# Patient Record
Sex: FEMALE | Race: WHITE | NOT HISPANIC OR LATINO | Employment: FULL TIME | URBAN - METROPOLITAN AREA
[De-identification: names, ages, dates, MRNs, and addresses within clinical notes are randomized per-mention and may not be internally consistent; named-entity substitution may affect disease eponyms.]

---

## 2017-01-12 ENCOUNTER — GENERIC CONVERSION - ENCOUNTER (OUTPATIENT)
Dept: OTHER | Facility: OTHER | Age: 47
End: 2017-01-12

## 2017-01-17 ENCOUNTER — ALLSCRIPTS OFFICE VISIT (OUTPATIENT)
Dept: OTHER | Facility: OTHER | Age: 47
End: 2017-01-17

## 2017-01-17 DIAGNOSIS — E03.9 HYPOTHYROIDISM: ICD-10-CM

## 2017-01-18 ENCOUNTER — LAB CONVERSION - ENCOUNTER (OUTPATIENT)
Dept: OTHER | Facility: OTHER | Age: 47
End: 2017-01-18

## 2017-01-18 ENCOUNTER — GENERIC CONVERSION - ENCOUNTER (OUTPATIENT)
Dept: OTHER | Facility: OTHER | Age: 47
End: 2017-01-18

## 2017-01-18 LAB — TSH SERPL DL<=0.05 MIU/L-ACNC: 3.94 MIU/L

## 2017-02-23 ENCOUNTER — ALLSCRIPTS OFFICE VISIT (OUTPATIENT)
Dept: OTHER | Facility: OTHER | Age: 47
End: 2017-02-23

## 2017-03-29 ENCOUNTER — ALLSCRIPTS OFFICE VISIT (OUTPATIENT)
Dept: OTHER | Facility: OTHER | Age: 47
End: 2017-03-29

## 2017-05-30 ENCOUNTER — ALLSCRIPTS OFFICE VISIT (OUTPATIENT)
Dept: OTHER | Facility: OTHER | Age: 47
End: 2017-05-30

## 2017-05-30 DIAGNOSIS — Z12.31 ENCOUNTER FOR SCREENING MAMMOGRAM FOR MALIGNANT NEOPLASM OF BREAST: ICD-10-CM

## 2017-06-19 ENCOUNTER — HOSPITAL ENCOUNTER (OUTPATIENT)
Dept: MAMMOGRAPHY | Facility: HOSPITAL | Age: 47
Discharge: HOME | End: 2017-06-19

## 2017-06-19 DIAGNOSIS — Z12.31 ENCOUNTER FOR SCREENING MAMMOGRAM FOR MALIGNANT NEOPLASM OF BREAST: ICD-10-CM

## 2017-06-19 PROCEDURE — G0202 SCR MAMMO BI INCL CAD: HCPCS

## 2017-10-31 DIAGNOSIS — I10 ESSENTIAL (PRIMARY) HYPERTENSION: ICD-10-CM

## 2017-10-31 DIAGNOSIS — E03.9 HYPOTHYROIDISM: ICD-10-CM

## 2017-11-13 ENCOUNTER — ALLSCRIPTS OFFICE VISIT (OUTPATIENT)
Dept: OTHER | Facility: OTHER | Age: 47
End: 2017-11-13

## 2017-11-14 NOTE — PROGRESS NOTES
Assessment    1  Essential hypertension (401 9) (I10)   2  Hypothyroidism (244 9) (E03 9)   3  Anxiety disorder (300 00) (F41 9)   4  Acute right-sided low back pain with right-sided sciatica (724 2,724 3) (M54 41)    Plan  Acute right-sided low back pain with right-sided sciatica    · PredniSONE 10 MG Oral Tablet; 5 for 2 days, 4 for 2 days, 3 for 2 days , 2for 2 days , 1 for 2 days  Anxiety disorder    · Citalopram Hydrobromide 20 MG Oral Tablet; TAKE 1 TABLET BY MOUTHEVERY DAY  Essential hypertension    · Metoprolol Tartrate 25 MG Oral Tablet; Take 1 tablet daily   · (1) CBC/PLT/DIFF; Status:Active; Requested for:13Nov2017;    · (1) COMPREHENSIVE METABOLIC PANEL; Status:Active; Requested for:13Nov2017;    · (1) LIPID PANEL, FASTING; Status:Active; Requested for:13Nov2017;    · (1) URINALYSIS (will reflex a microscopy if leukocytes, occult blood, protein or nitrites arenot within normal limits); Status:Active; Requested for:13Nov2017;   Hypothyroidism    · Levothyroxine Sodium 100 MCG Oral Tablet; TAKE 1 TABLET BY MOUTH ONCEDAILY   · (1) T4, FREE; Status:Active; Requested for:13Nov2017;    · (1) TSH; Status:Active; Requested for:13Nov2017;   Patient will call me next week and let me know how she is doing with her back  Continue other medications  Obtain the ordered lab work  Staff will call her only get the results  Discussion/Summary  Possible side effects of new medications were reviewed with the patient/guardian today  The treatment plan was reviewed with the patient/guardian  The patient/guardian understands and agrees with the treatment plan      Chief Complaint  follow up thyroid,anxiety,htn  refill meds 90 days back pain      History of Present Illness  Patient is a 81-year-old female presenting to the office for follow-up on hypertension, hypothyroidism, and anxiety  Overall she feels well from that standpoint  She is complaining of some right-sided low back pain  Does radiate down her right leg  Think she may have had a similar problem years ago  Review of Systems   Constitutional: No fever, no chills, feels well, no tiredness, no recent weight gain or weight loss  Eyes: No complaints of eye pain, no red eyes, no eyesight problems, no discharge, no dry eyes, no itching of eyes  ENT: no complaints of earache, no loss of hearing, no nose bleeds, no nasal discharge, no sore throat, no hoarseness  Cardiovascular: No complaints of slow heart rate, no fast heart rate, no chest pain, no palpitations, no leg claudication, no lower extremity edema  Respiratory: No complaints of shortness of breath, no wheezing, no cough, no SOB on exertion, no orthopnea, no PND  Gastrointestinal: No complaints of abdominal pain, no constipation, no nausea or vomiting, no diarrhea, no bloody stools  Genitourinary: no dysuria-- and-- no incontinence  Musculoskeletal: No complaints of arthralgias, no myalgias, no joint swelling or stiffness, no limb pain or swelling  Integumentary: No complaints of skin rash or lesions, no itching, no skin wounds, no breast pain or lump  Neurological: No complaints of headache, no confusion, no convulsions, no numbness, no dizziness or fainting, no tingling, no limb weakness, no difficulty walking  Psychiatric: anxiety-- and-- Stable on current medications  Endocrine: No complaints of proptosis, no hot flashes, no muscle weakness, no deepening of the voice, no feelings of weakness  Hematologic/Lymphatic: No complaints of swollen glands, no swollen glands in the neck, does not bleed easily, does not bruise easily  Active Problems    1  Anxiety disorder (300 00) (F41 9)   2  Encounter for screening mammogram for malignant neoplasm of breast (V76 12) (Z12 31)   3  Essential hypertension (401 9) (I10)   4  Generalized osteoarthritis of multiple sites (715 09) (M15 9)   5  Hypothyroidism (244 9) (E03 9)   6  Stress incontinence, female (625 6) (N39 3)   7   Surgical menopause (627 4) (E89 40)    Past Medical History    1  History of Conjunctivitis of left eye (372 30) (H10 9)   2  History of Encounter for routine gynecological examination with Papanicolaou smear of cervix (V72 31,V76 2) (Z01 419)   3  History of cervical dysplasia (V13 22) (Z87 410)   4  History of dizziness (V13 89) (Z87 898)   5  History of viral gastroenteritis (V12 09) (Z86 19)   6  History of Menopause (627 2) (Z78 0)    Surgical History  1  History of Salpingo-oophorectomy Bilateral   2  History of Total Abdominal Hysterectomy   3  History of Tubal Ligation    The surgical history was reviewed and updated today  Family History  Father    1  Family history of diabetes mellitus (V18 0) (Z83 3)   2  Family history of hypertension (V17 49) (Z82 49)  Family History    3  Family history of Breast Cancer (V16 3)   4  Family history of Diabetes Mellitus (V18 0)   5  Family history of Hypertension (V17 49)   6  Family history of Skin Cancer (V16 8)    The family history was reviewed and updated today  Social History     · Being A Social Drinker   · Denied: History of Drug Use   · Former smoker (V15 82) (O01 615)   · Uses Safety Equipment - Seatbelts  The social history was reviewed and updated today  The social history was reviewed and is unchanged  Current Meds   1  Citalopram Hydrobromide 20 MG Oral Tablet; TAKE ONE TABLET BY MOUTH ONCE DAILY; Therapy: 90GTN1357 to (Evaluate:39Hun1273)  Requested for: 16Oci6313; Last Rx:74Iuc6227 Ordered   2  Estradiol 2 MG Oral Tablet; TAKE 1 TABLET DAILY; Therapy: 77NXW7804 to (Daryn Easton)  Requested for: 13XWJ5764; Last Rx:57Yti4851 Ordered   3  Levothyroxine Sodium 100 MCG Oral Tablet; TAKE ONE TABLET BY MOUTH ONCE DAILY; Therapy: 28ZFP6398 to (Evaluate:57Hqy4264)  Requested for: 65IJY9718; Last Rx:01Pcd4207 Ordered   4  Metoprolol Tartrate 25 MG Oral Tablet; TAKE ONE TABLET BY MOUTH ONCE DAILY;  Therapy: 41PFW1170 to [phone])  Requested for: [phone]; Last Rx:21Gzi8901 Ordered   5  Nabumetone 500 MG Oral Tablet; Take 1 tablet twice daily; Therapy: 04Mnf5853 to (Parisa Cuong)  Requested for: 56Swn2118; Last Rx:72Crr9704 Ordered    The medication list was reviewed and updated today  Allergies  1  ACE Inhibitors   2  Phenothiazines   3  Bactrim DS TABS   4  Compazine CPCR   5  Quinolones    Vitals  Vital Signs    Recorded: 43DOC4626 76:57JK   Systolic 757   Diastolic 78   Height 5 ft 5 5 in   Weight 280 lb    BMI Calculated 45 89   BSA Calculated 2 29       Physical Exam   Constitutional  General appearance: Abnormal   acutely ill,-- obese-- and-- appears tired  Ears, Nose, Mouth, and Throat  External inspection of ears and nose: Normal    Otoscopic examination: Tympanic membranes translucent with normal light reflex  Canals patent without erythema  Nasal mucosa, septum, and turbinates: Normal without edema or erythema  Oropharynx: Normal with no erythema, edema, exudate or lesions  Pulmonary  Auscultation of lungs: Clear to auscultation  Cardiovascular  Auscultation of heart: Normal rate and rhythm, normal S1 and S2, without murmurs  Examination of extremities for edema and/or varicosities: Normal    Abdomen  Abdomen: Non-tender, no masses  Lymphatic  Palpation of lymph nodes in neck: No lymphadenopathy  Musculoskeletal  Gait and station: Normal  -- Right lumbar tenderness with decreased range of motion  Negative straight leg raising  Motor and sensorium are grossly intact  Skin  Skin and subcutaneous tissue: Normal without rashes or lesions  Neurologic  Cranial nerves: Cranial nerves 2-12 intact  Reflexes: 2+ and symmetric  Sensation: No sensory loss     Psychiatric  Orientation to person, place, and time: Normal    Mood and affect: Normal          Results/Data  * MAMMO SCREENING BILATERAL W CAD 56KHA1488 01:51PM Reynaldo Damon      Order Number: [id number]  Performing Comments: breast ca in maternal grandmother age 61   - Patient Instructions: To schedule this appointment, please contact Central Scheduling at [phone]  Do not wear any perfume, powder, lotion or deodorant on breast or underarm area  Please bring your doctors order, referral (if needed) and insurance information with you on the day of the test  Failure to bring this information may result in this test being rescheduled  Arrive 15 minutes prior to your appointment time to register  On the day of your test, please bring any prior mammogram or breast studies with you that were not performed at a [facility]  Failure to bring prior exams may result in your test needing to be rescheduled  Test Name Result Flag Reference   MAMMO SCREENING BILATERAL W CAD (Report)       Patient History:  Patient is postmenopausal   Family history of breast cancer at age 61 in maternal   grandmother  Taking estrogen beginning at age 30  Patient is a former smoker  Patient's BMI is 44 4  Reason for exam: screening, asymptomatic  Mammo Screening Bilateral W CAD: June 19, 2017 - Check In #:   [de-identified]  Bilateral MLO and CC view(s) were taken  Technologist: ANGELINA Castro (ANGELINA)(M)  Prior study comparison: May 23, 2016, mammo screening bilateral W  CAD performed at [facility]  October 22, 2012, bilateral digital screening mammogram,   performed at [place] [address]  September 19, 2011, bilateral digital screening mammogram, performed at [place] [address]  October 15, 2007,   bilateral digital bilat mammo cad, performed at [place] [address]  There are scattered fibroglandular densities  No dominant soft tissue mass, architectural distortion or   suspicious calcifications are noted in either breast  The skin   and nipple contours are within normal limits  No evidence of malignancy  No significant changes when compared with prior studies     ACR BI-RADSï¾® Assessments: BiRad:1 - Negative   Recommendation:  Routine screening mammogram of both breasts in 1 year  A   reminder letter will be scheduled  Analyzed by CAD   8-10% of cancers will be missed on mammography  Management of a   palpable abnormality must be based on clinical grounds  Patients  will be notified of their results via letter from our facility  Accredited by [employer] of [employer] and FDA     Transcription Location: [facility] 143: [id number]   Risk Value(s):  Tyrer-Cuzick 10 Year: 1 500%, Tyrer-Cuzick Lifetime: 7 800%,   Myriad Table: 1 5%, FRANCO 5 Year: 0 6%, NCI Lifetime: 6 3%  Signed by:  Allan Daly MD  6/19/17     (Q) TSH, 3RD GENERATION [id number] 10:58AM [facility]     Test Name Result Flag Reference   TSH 3 94 mIU/L       Reference Range                       > or = 20 Years  0 40-4 50                            Pregnancy Ranges           First trimester    0 26-2 66           Second trimester   0 55-2 73           Third trimester    0 43-2 91       Signatures   Electronically signed by : Yeimi Stevenson DO; Nov 13 2017  3:39PM EST                       (Author)

## 2018-01-03 ENCOUNTER — GENERIC CONVERSION - ENCOUNTER (OUTPATIENT)
Dept: OTHER | Facility: OTHER | Age: 48
End: 2018-01-03

## 2018-01-03 ENCOUNTER — LAB CONVERSION - ENCOUNTER (OUTPATIENT)
Dept: OTHER | Facility: OTHER | Age: 48
End: 2018-01-03

## 2018-01-03 LAB
T4 FREE SERPL-MCNC: 1 NG/DL (ref 0.8–1.8)
TSH SERPL DL<=0.05 MIU/L-ACNC: 3.54 MIU/L

## 2018-01-09 NOTE — MISCELLANEOUS
Message  Patient called to set up appointment for annual exam  Patient does not have insurance therefore did not want to have annual exam because she is still making payments from last exam  I gave patient information for the [employer] so she can set up a free exam with them  Active Problems    1  Anxiety disorder (300 00) (F41 9)   2  Essential hypertension (401 9) (I10)   3  Generalized osteoarthritis of multiple sites (715 09) (M15 9)   4  Hypothyroidism (244 9) (E03 9)    Current Meds   1  Citalopram Hydrobromide 20 MG Oral Tablet; TAKE 1 TABLET BY MOUTH EVERY DAY; Therapy: 28OFD8405 to (Evaluate:03Jul2016)  Requested for: 94WUN3534; Last   Rx:05Jan2016 Ordered   2  Estradiol 2 MG Oral Tablet; TAKE 1 TABLET DAILY; Therapy: 87CSZ8240 to (Evaluate:05Jun2016)  Requested for: 49UBU8549; Last   Rx:06Apr2016 Ordered   3  Levothyroxine Sodium 100 MCG Oral Tablet; TAKE 1 TABLET DAILY; Therapy: 45CPP6592 to (Evaluate:04Jul2016)  Requested for: 38CYR0682; Last   Rx:06Jan2016 Ordered   4  LORazepam 0 5 MG Oral Tablet; TAKE ONE-HALF TO ONE TABLET BY MOUTH THREE   TIMES DAILY AS NEEDED; Therapy: 28Wkk5841 to (Filiberto Skinner)  Requested for: 27DON6740; Last   Rx:50Zdc1444; Status: ACTIVE - Renewal Denied Ordered   5  Meloxicam 15 MG Oral Tablet; take 1 tablet every day; Therapy: 14GWW2535 to (Evaluate:03Jul2016)  Requested for: 85KAS8433; Last   Rx:05Jan2016 Ordered   6  Metoprolol Tartrate 25 MG Oral Tablet; Take 1 tablet daily; Therapy: 40KJL1669 to (Evaluate:03Jul2016)  Requested for: 51NFJ8580; Last   Rx:05Jan2016 Ordered    Allergies    1  ACE Inhibitors   2  Phenothiazines   3  Bactrim DS TABS   4  Compazine CPCR   5   Quinolones    Signatures   Electronically signed by : Naomi Hercules, ; Apr 7 2016  2:05PM EST                       (Author)

## 2018-01-11 NOTE — RESULT NOTES
Verified Results  (Q) TSH, 3RD GENERATION [id number] 10:13AM Susanne Fox   REPORT COMMENT:  FASTING:NO     Test Name Result Flag Reference   TSH 3 97 mIU/L     Reference Range                         > or = 20 Years  0 40-4 50                              Pregnancy Ranges            First trimester    0 26-2 66            Second trimester   0 55-2 73            Third trimester    0 43-2 91

## 2018-01-12 VITALS — DIASTOLIC BLOOD PRESSURE: 78 MMHG | WEIGHT: 275.5 LBS | SYSTOLIC BLOOD PRESSURE: 120 MMHG | BODY MASS INDEX: 45.85 KG/M2

## 2018-01-12 VITALS
SYSTOLIC BLOOD PRESSURE: 122 MMHG | WEIGHT: 275.38 LBS | BODY MASS INDEX: 45.83 KG/M2 | TEMPERATURE: 99.7 F | DIASTOLIC BLOOD PRESSURE: 82 MMHG

## 2018-01-12 NOTE — MISCELLANEOUS
Message  Having discussed my conversation with the pt; the Estrace costs $300  She brought a form to the office 2 wks after her Sept 2016 felicia't to have filled in by Dr Drake Dela Cruz  This form can be submitted to the company for pt to receive a price reduction in the Estrace (she is self pay)  The staff in [facility] will look for the form  I asked pt if she could download another form + the HIPAA authorization to release information  She will look for the website she had used before but can't locate the site yet  Dr Drkae Dela Cruz stated: Pt was going to try weight loss and Kegel exercises with the Estrace Cream  If pt has lost some weight and is using the [address] and does not have improvement in her symptoms, she does not have to use the Estrace and she should come for a follow up felicia't to discuss possible surgery  Pt stated she has just begun weight loss at home (dietary), is doing her Kegel exercises and reports she has some improvement in symptoms  Pt stated she is not interested in surgery  She will get the Estrace Cream and f/u in 3 mos  Advised to return to clinic prn  Active Problems    1  Anxiety disorder (300 00) (F41 9)   2  Essential hypertension (401 9) (I10)   3  Generalized osteoarthritis of multiple sites (715 09) (M15 9)   4  Hypothyroidism (244 9) (E03 9)   5  Stress incontinence, female (625 6) (N39 3)   6  Surgical menopause (627 4) (E89 40)    Current Meds   1  Citalopram Hydrobromide 20 MG Oral Tablet; TAKE ONE TABLET BY MOUTH ONCE   DAILY; Therapy: 62PFR6517 to (Evaluate:02Eal8664)  Requested for: 90LAN1981; Last   Rx:12Jan2017 Ordered   2  Estrace 0 1 MG/GM Vaginal Cream; APPLY A PEA-SIZED AMOUNT TO VAGINAL   OPENING EVERY MONDAY  AND FRIDAY EVENING; Therapy: 13BHL7316 to (Last Rx:08Sep2016)  Requested for: 08Sep2016 Ordered   3  Estradiol 2 MG Oral Tablet; TAKE 1 TABLET DAILY; Therapy: 40VAZ8410 to (Evaluate:12May2017)  Requested for: 21RBA1682; Last   Rx:17May2016 Ordered   4   Levothyroxine Sodium 100 MCG Oral Tablet; TAKE ONE TABLET BY MOUTH ONCE   DAILY; Therapy: 42XEB7642 to (Evaluate:06Kte1326)  Requested for: 85Msv7815; Last   Rx:42Tjg5204 Ordered   5  Meloxicam 15 MG Oral Tablet; TAKE ONE TABLET BY MOUTH ONCE DAILY; Therapy: 14ZMG5997 to (Evaluate:58Mwi5697)  Requested for: 39VFZ7670; Last   Rx:12Jan2017 Ordered   6  Metoprolol Tartrate 25 MG Oral Tablet; TAKE ONE TABLET BY MOUTH ONCE DAILY; Therapy: 62VSO9890 to (Evaluate:12Apr2017)  Requested for: 90QLV7876; Last   Rx:12Jan2017 Ordered   7  Metoprolol Tartrate 50 MG Oral Tablet; TAKE 1 TABLET DAILY; Therapy: 51OBM4443 to (Florence Black)  Requested for: 93WGQ5812; Last   Rx:01Jun2016 Ordered    Allergies    1  ACE Inhibitors   2  Phenothiazines   3  Bactrim DS TABS   4  Compazine CPCR   5   Quinolones    Signatures   Electronically signed by : Akosua Rodrigez RN; Jan 16 2017  5:16PM EST                       (Author)

## 2018-01-13 VITALS
BODY MASS INDEX: 45 KG/M2 | WEIGHT: 280 LBS | DIASTOLIC BLOOD PRESSURE: 78 MMHG | HEIGHT: 66 IN | SYSTOLIC BLOOD PRESSURE: 120 MMHG

## 2018-01-13 VITALS
DIASTOLIC BLOOD PRESSURE: 81 MMHG | HEIGHT: 66 IN | WEIGHT: 274 LBS | BODY MASS INDEX: 44.03 KG/M2 | SYSTOLIC BLOOD PRESSURE: 122 MMHG

## 2018-01-15 VITALS
TEMPERATURE: 98 F | DIASTOLIC BLOOD PRESSURE: 78 MMHG | WEIGHT: 277 LBS | BODY MASS INDEX: 45.6 KG/M2 | SYSTOLIC BLOOD PRESSURE: 132 MMHG

## 2018-01-15 NOTE — MISCELLANEOUS
Message  Pt had urogynecology felicia't  9/8/16  Estrace vag cream was presribed  Pt is self pay  She said the cost was greater than $300  She had a form to be completed which is from the  so she can obtain the Estrace at a lower price    She will bring in a new form and new authorization to release information to be completed  She had cancelled her f/u appt because she had not been using the cream       Active Problems    1  Anxiety disorder (300 00) (F41 9)   2  Essential hypertension (401 9) (I10)   3  Generalized osteoarthritis of multiple sites (715 09) (M15 9)   4  Hypothyroidism (244 9) (E03 9)   5  Stress incontinence, female (625 6) (N39 3)   6  Surgical menopause (627 4) (E89 40)    Current Meds   1  Citalopram Hydrobromide 20 MG Oral Tablet; TAKE ONE TABLET BY MOUTH ONCE   DAILY; Therapy: 54FDX5248 to (Evaluate:25Cmi3286)  Requested for: 84GXM5114; Last   Rx:12Jan2017 Ordered   2  Estrace 0 1 MG/GM Vaginal Cream; APPLY A PEA-SIZED AMOUNT TO VAGINAL   OPENING EVERY MONDAY  AND FRIDAY EVENING; Therapy: 32DST4448 to (Last Rx:42Fty8489)  Requested for: 22Dwi0634 Ordered   3  Estradiol 2 MG Oral Tablet; TAKE 1 TABLET DAILY; Therapy: 79EGB4283 to (Evaluate:91Rpg3261)  Requested for: 09BWT4597; Last   Rx:32Bzi8893 Ordered   4  Levothyroxine Sodium 100 MCG Oral Tablet; TAKE ONE TABLET BY MOUTH ONCE   DAILY; Therapy: 65NTI7670 to (Evaluate:23Soo1089)  Requested for: 88Dfc5011; Last   Rx:41Xjy6012 Ordered   5  Meloxicam 15 MG Oral Tablet; TAKE ONE TABLET BY MOUTH ONCE DAILY; Therapy: 02EWY7046 to (Evaluate:39Ywu8243)  Requested for: 33XGA6754; Last   Rx:12Jan2017 Ordered   6  Metoprolol Tartrate 25 MG Oral Tablet; TAKE ONE TABLET BY MOUTH ONCE DAILY; Therapy: 60GGN9710 to (Evaluate:12Apr2017)  Requested for: 26QME8873; Last   Rx:12Jan2017 Ordered   7  Metoprolol Tartrate 50 MG Oral Tablet; TAKE 1 TABLET DAILY;    Therapy: 45UIC6208 to (Real [place])  Requested for: 69IQZ2047; Last Rx: 52XEI8865 Ordered    Allergies    1  ACE Inhibitors   2  Phenothiazines   3  Bactrim DS TABS   4  Compazine CPCR   5   Quinolones    Signatures   Electronically signed by : Brien Butt RN; Jan 16 2017  5:14PM EST                       (Author)

## 2018-01-15 NOTE — RESULT NOTES
Verified Results  (Q) TSH, 3RD GENERATION 82DRJ5814 10:58AM Blanche Lanes     Test Name Result Flag Reference   TSH 3 94 mIU/L     Reference Range                         > or = 20 Years  0 40-4 50                              Pregnancy Ranges            First trimester    0 26-2 66            Second trimester   0 55-2 73            Third trimester    0 43-2 91       Discussion/Summary   Thyroid lab work was normal  Continue current dose  Recheck in 6 months

## 2018-01-23 NOTE — RESULT NOTES
Discussion/Summary   Thyroid is normal   Continue current medications       Verified Results  (1) T4, FREE 93YKM9253 11:48AM Merlinda Hodgkins     Test Name Result Flag Reference   T4, FREE 1 0 ng/dL  0 8-1 8     (Q) TSH, 3RD GENERATION [id number] 11:48AM Merlinda Hodgkins   REPORT COMMENT:  FASTING:YES     Test Name Result Flag Reference   TSH 3 54 mIU/L     Reference Range                         > or = 20 Years  0 40-4 50                              Pregnancy Ranges            First trimester    0 26-2 66            Second trimester   0 55-2 73            Third trimester    0 43-2 91

## 2018-03-06 ENCOUNTER — TELEPHONE (OUTPATIENT)
Dept: FAMILY MEDICINE CLINIC | Facility: CLINIC | Age: 48
End: 2018-03-06

## 2018-03-07 NOTE — TELEPHONE ENCOUNTER
I am not qualified to read MRIs, nor is any chiropractor that I know of what diagnosis to the chiropractor give her    If she lets me know I might be able send her to an appropriate orthopedic doctor or pain management specialist

## 2018-03-09 NOTE — PROGRESS NOTES
MRI does show herniated disc disease  If it is chest pain going down the leg, she should see pain management  If she has any neurologic deficits such as weakness in the leg, loss of control of bowel or bladder, she needs to see a surgeon sooner than later  If she has sudden onset of profound weakness, she needs to go to the emergency room  She should not get manipulation of her lumbar spine, it could put her in a wheelchair

## 2018-04-15 RX ORDER — NABUMETONE 500 MG/1
1 TABLET, FILM COATED ORAL 2 TIMES DAILY
COMMUNITY
Start: 2017-08-06 | End: 2018-12-17 | Stop reason: ALTCHOICE

## 2018-04-15 RX ORDER — CITALOPRAM 20 MG/1
1 TABLET ORAL DAILY
COMMUNITY
Start: 2012-12-02 | End: 2018-09-07 | Stop reason: SDUPTHER

## 2018-04-15 RX ORDER — LEVOTHYROXINE SODIUM 0.1 MG/1
1 TABLET ORAL DAILY
COMMUNITY
Start: 2016-01-06 | End: 2019-01-29 | Stop reason: SDUPTHER

## 2018-04-16 ENCOUNTER — OFFICE VISIT (OUTPATIENT)
Dept: FAMILY MEDICINE CLINIC | Facility: CLINIC | Age: 48
End: 2018-04-16

## 2018-04-16 VITALS
BODY MASS INDEX: 45 KG/M2 | WEIGHT: 280 LBS | DIASTOLIC BLOOD PRESSURE: 68 MMHG | HEIGHT: 66 IN | SYSTOLIC BLOOD PRESSURE: 110 MMHG

## 2018-04-16 DIAGNOSIS — R42 VERTIGO: Primary | ICD-10-CM

## 2018-04-16 PROBLEM — M51.9 LUMBAR DISC DISEASE: Status: ACTIVE | Noted: 2018-04-16

## 2018-04-16 PROCEDURE — 99212 OFFICE O/P EST SF 10 MIN: CPT | Performed by: FAMILY MEDICINE

## 2018-04-16 RX ORDER — ESTRADIOL 2 MG/1
2 TABLET ORAL DAILY
COMMUNITY
End: 2018-06-04 | Stop reason: SDUPTHER

## 2018-04-16 NOTE — PATIENT INSTRUCTIONS
I discussed with the patient her MRI report and current therapy  I cautioned her about manipulative therapy with her known disc disease, but she states that it is helping her and she will continue at this time  I recommended that once she get some insurance coverage she should follow up with pain management and possibly consider injection therapy

## 2018-04-16 NOTE — PROGRESS NOTES
Assessment/Plan:         Diagnoses and all orders for this visit:    Vertigo  Comments:  Most likely BPPV, pt has no insurance , will hold off on PT for now  Staff  will call with results  Orthostatic precations discussed  Orders:  -     Comprehensive metabolic panel; Future  -     CBC and differential; Future    Other orders  -     estradiol (ESTRACE) 2 MG tablet; Take 2 mg by mouth daily          Subjective:   Chief Complaint   Patient presents with    Dizziness      x 1 month         Patient ID: Tano Fernandez is a 53 y o  female  Patient is a 50-year-old female presenting to the office complaining of dizziness when changing position  Happens mostly when she rolls over in bed, but also happens when she gets up from lying down or gets up from being seated quickly  Episodes last less than 30 sec  Definitely positional   Denies any fever, chills, head trauma, new medications  Has been seeing a chiropractor for her low back pain and has questions about that as well  The following portions of the patient's history were reviewed and updated as appropriate: allergies, current medications, past family history, past medical history, past social history, past surgical history and problem list     Review of Systems   Constitutional: Negative  HENT: Negative  Eyes: Negative  Respiratory: Negative  Gastrointestinal: Negative  Endocrine: Positive for polydipsia and polyuria  Negative for polyphagia  Genitourinary: Positive for frequency  Negative for difficulty urinating, dysuria and urgency  Musculoskeletal: Positive for arthralgias and back pain  Negative for joint swelling  Neurological: Positive for dizziness  Negative for tremors, seizures, syncope, facial asymmetry, speech difficulty, weakness, light-headedness, numbness and headaches  Hematological: Negative for adenopathy  Does not bruise/bleed easily           Objective:      /68   Ht 5' 6" (1 676 m)   Wt 127 kg (280 lb) BMI 45 19 kg/m²          Physical Exam   Constitutional: She is oriented to person, place, and time  She appears well-developed and well-nourished  No distress  Morbidly obese  HENT:   Head: Normocephalic and atraumatic  Right Ear: External ear normal    Left Ear: External ear normal    Nose: Nose normal    Mouth/Throat: Oropharynx is clear and moist    Eyes: Conjunctivae and EOM are normal  Pupils are equal, round, and reactive to light  Neck: Normal range of motion  Neck supple  No JVD present  No tracheal deviation present  No thyromegaly present  Cardiovascular: Normal rate, regular rhythm and normal heart sounds  No murmur heard  Pulmonary/Chest: Effort normal and breath sounds normal  She has no wheezes  She has no rales  Abdominal: Soft  Bowel sounds are normal  She exhibits no mass  There is no tenderness  There is no rebound and no guarding  Musculoskeletal: Normal range of motion  Mild right lumbar tenderness  Limited range of motion with side bending rotation  Lymphadenopathy:     She has no cervical adenopathy  Neurological: She is alert and oriented to person, place, and time  She has normal reflexes  No cranial nerve deficit  Skin: Skin is warm and dry  No lesion and no rash noted  Psychiatric: She has a normal mood and affect  Judgment normal    Nursing note and vitals reviewed

## 2018-04-17 LAB
ALBUMIN SERPL-MCNC: 4.2 G/DL (ref 3.6–5.1)
ALBUMIN/GLOB SERPL: 1.6 (CALC) (ref 1–2.5)
ALP SERPL-CCNC: 67 U/L (ref 33–115)
ALT SERPL-CCNC: 19 U/L (ref 6–29)
AST SERPL-CCNC: 21 U/L (ref 10–35)
BASOPHILS # BLD AUTO: 29 CELLS/UL (ref 0–200)
BASOPHILS NFR BLD AUTO: 0.6 %
BILIRUB SERPL-MCNC: 0.4 MG/DL (ref 0.2–1.2)
BUN SERPL-MCNC: 16 MG/DL (ref 7–25)
BUN/CREAT SERPL: NORMAL (CALC) (ref 6–22)
CALCIUM SERPL-MCNC: 8.8 MG/DL (ref 8.6–10.2)
CHLORIDE SERPL-SCNC: 103 MMOL/L (ref 98–110)
CO2 SERPL-SCNC: 29 MMOL/L (ref 20–31)
CREAT SERPL-MCNC: 0.87 MG/DL (ref 0.5–1.1)
EOSINOPHIL # BLD AUTO: 163 CELLS/UL (ref 15–500)
EOSINOPHIL NFR BLD AUTO: 3.4 %
ERYTHROCYTE [DISTWIDTH] IN BLOOD BY AUTOMATED COUNT: 12.7 % (ref 11–15)
GLOBULIN SER CALC-MCNC: 2.6 G/DL (CALC) (ref 1.9–3.7)
GLUCOSE SERPL-MCNC: 92 MG/DL (ref 65–99)
HCT VFR BLD AUTO: 40.4 % (ref 35–45)
HGB BLD-MCNC: 13.5 G/DL (ref 11.7–15.5)
LYMPHOCYTES # BLD AUTO: 1877 CELLS/UL (ref 850–3900)
LYMPHOCYTES NFR BLD AUTO: 39.1 %
MCH RBC QN AUTO: 27.7 PG (ref 27–33)
MCHC RBC AUTO-ENTMCNC: 33.4 G/DL (ref 32–36)
MCV RBC AUTO: 82.8 FL (ref 80–100)
MONOCYTES # BLD AUTO: 403 CELLS/UL (ref 200–950)
MONOCYTES NFR BLD AUTO: 8.4 %
NEUTROPHILS # BLD AUTO: 2328 CELLS/UL (ref 1500–7800)
NEUTROPHILS NFR BLD AUTO: 48.5 %
PLATELET # BLD AUTO: 214 THOUSAND/UL (ref 140–400)
PMV BLD REES-ECKER: 10.1 FL (ref 7.5–12.5)
POTASSIUM SERPL-SCNC: 4.4 MMOL/L (ref 3.5–5.3)
PROT SERPL-MCNC: 6.8 G/DL (ref 6.1–8.1)
RBC # BLD AUTO: 4.88 MILLION/UL (ref 3.8–5.1)
SL AMB EGFR AFRICAN AMERICAN: 92 ML/MIN/1.73M2
SL AMB EGFR NON AFRICAN AMERICAN: 79 ML/MIN/1.73M2
SODIUM SERPL-SCNC: 139 MMOL/L (ref 135–146)
WBC # BLD AUTO: 4.8 THOUSAND/UL (ref 3.8–10.8)

## 2018-06-04 DIAGNOSIS — Z12.39 SCREENING FOR BREAST CANCER: ICD-10-CM

## 2018-06-04 DIAGNOSIS — E89.40 SURGICAL MENOPAUSE: Primary | ICD-10-CM

## 2018-06-07 RX ORDER — ESTRADIOL 2 MG/1
TABLET ORAL
Qty: 30 TABLET | Refills: 11 | Status: SHIPPED | OUTPATIENT
Start: 2018-06-07 | End: 2018-12-17

## 2018-06-10 DIAGNOSIS — E89.40 SURGICAL MENOPAUSE: ICD-10-CM

## 2018-06-13 RX ORDER — ESTRADIOL 2 MG/1
TABLET ORAL
Qty: 30 TABLET | Refills: 11 | OUTPATIENT
Start: 2018-06-13

## 2018-06-19 ENCOUNTER — OFFICE VISIT (OUTPATIENT)
Dept: OBGYN CLINIC | Facility: CLINIC | Age: 48
End: 2018-06-19
Payer: COMMERCIAL

## 2018-06-19 VITALS
DIASTOLIC BLOOD PRESSURE: 77 MMHG | HEIGHT: 66 IN | HEART RATE: 69 BPM | BODY MASS INDEX: 44.2 KG/M2 | SYSTOLIC BLOOD PRESSURE: 125 MMHG | WEIGHT: 275 LBS

## 2018-06-19 DIAGNOSIS — Z01.419 ENCOUNTER FOR ANNUAL ROUTINE GYNECOLOGICAL EXAMINATION: Primary | ICD-10-CM

## 2018-06-19 DIAGNOSIS — N95.1 HOT FLASHES DUE TO MENOPAUSE: ICD-10-CM

## 2018-06-19 PROCEDURE — 99386 PREV VISIT NEW AGE 40-64: CPT | Performed by: NURSE PRACTITIONER

## 2018-06-19 RX ORDER — ESTRADIOL 1 MG/1
1 TABLET ORAL DAILY
Qty: 90 TABLET | Refills: 4 | Status: SHIPPED | OUTPATIENT
Start: 2018-06-19 | End: 2019-12-03

## 2018-06-19 NOTE — PROGRESS NOTES
Assessment         1  Encounter for annual routine gynecological examination     2  Hot flashes due to menopause  estradiol (ESTRACE) 1 mg tablet            Plan      All questions answered  Breast self exam technique reviewed and patient encouraged to perform self-exam monthly  Discussed healthy lifestyle modifications  Follow up in 1 year  Mammogram    Continue estrace as directed  AISLINN HALE  [facility] mammogram appointment  Call with needs or concerns  Return in 1 year  Pt verbalized understanding of all discussed  Subjective      Rachel Colon is a 53 y o  female who presents for annual exam  Periods are not occurring Pt had MIGUEL BSO, lasting 0 days  Dysmenorrhea:none  Cyclic symptoms include none  No intermenstrual bleeding, spotting, or discharge  The patient reports that there is not domestic violence in her life  No currernt partner  Pt is taking Estrace for Hot Flashes and in ability to sleep  Pt states Family doctor would like her to stop Estrace she refused Due to hot flashes and inability to sleep, Discussed risk, pt states she needs the medication  Pt states she has been off estrace since 6/1/18 and has not slept and has hot flashes all day  Agreed to decrease Estrace to 1 mg and see how she feels  Pt had a MIGUEL BSO in 2014 for HPV? And endometriosis  Current contraception: none  History of abnormal Pap smear: no  Family history of uterine or ovarian cancer: no  Regular self breast exam: yes  History of abnormal mammogram: no  Family history of breast cancer: yes - Grandmother, provided genetic information  History of abnormal lipids: unsure  Menstrual History:  OB History     Obstetric Comments    MENOPAUSE         Menarche age: 13  No LMP recorded  Patient has had a hysterectomy   done in 2004       The following portions of the patient's history were reviewed and updated as appropriate: allergies, current medications, past family history, past medical history, past social history, past surgical history and problem list     Review of Systems  Pertinent items are noted in HPI        Objective      /77   Pulse 69   Ht 5' 5 6" (1 666 m)   Wt 125 kg (275 lb)   BMI 44 93 kg/m²     General:   alert and oriented, in no acute distress, alert, appears stated age and cooperative   Heart: regular rate and rhythm, S1, S2 normal, no murmur, click, rub or gallop   Lungs: clear to auscultation bilaterally   Thyroid negative masses   Abdomen: soft, non-tender, without masses or organomegaly   Vulva: normal   Vagina: normal mucosa   Cervix: surgically absent   Uterus: surgically absent   Adnexa: surgically absent   Breasts NT, negative masses, discharge or dimpling

## 2018-06-19 NOTE — PATIENT INSTRUCTIONS
Continue estrace as directed  AISLINN HALE  [facility] mammogram appointment  Call with needs or concerns  Return in 1 year

## 2018-06-20 ENCOUNTER — TELEPHONE (OUTPATIENT)
Dept: OBGYN CLINIC | Facility: CLINIC | Age: 48
End: 2018-06-20

## 2018-06-20 NOTE — TELEPHONE ENCOUNTER
Patient dumped 90 pills of her estrace medication into her cereal by accident   She is to call the pharmacy and use one of her refills

## 2018-06-26 ENCOUNTER — OFFICE VISIT (OUTPATIENT)
Dept: FAMILY MEDICINE CLINIC | Facility: CLINIC | Age: 48
End: 2018-06-26
Payer: COMMERCIAL

## 2018-06-26 VITALS
TEMPERATURE: 97.7 F | HEIGHT: 66 IN | BODY MASS INDEX: 43.87 KG/M2 | DIASTOLIC BLOOD PRESSURE: 88 MMHG | SYSTOLIC BLOOD PRESSURE: 120 MMHG | WEIGHT: 273 LBS

## 2018-06-26 DIAGNOSIS — M54.50 ACUTE RIGHT-SIDED LOW BACK PAIN WITHOUT SCIATICA: ICD-10-CM

## 2018-06-26 DIAGNOSIS — M51.9 LUMBAR DISC DISEASE: ICD-10-CM

## 2018-06-26 DIAGNOSIS — R10.84 GENERALIZED ABDOMINAL PAIN: Primary | ICD-10-CM

## 2018-06-26 LAB
SL AMB  POCT GLUCOSE, UA: NORMAL
SL AMB LEUKOCYTE ESTERASE,UA: NORMAL
SL AMB POCT BILIRUBIN,UA: NORMAL
SL AMB POCT BLOOD,UA: NORMAL
SL AMB POCT CLARITY,UA: CLEAR
SL AMB POCT COLOR,UA: YELLOW
SL AMB POCT KETONES,UA: NORMAL
SL AMB POCT NITRITE,UA: NORMAL
SL AMB POCT PH,UA: 5
SL AMB POCT SPECIFIC GRAVITY,UA: 1.02
SL AMB POCT URINE PROTEIN: NORMAL
SL AMB POCT UROBILINOGEN: 0.2

## 2018-06-26 PROCEDURE — 81002 URINALYSIS NONAUTO W/O SCOPE: CPT | Performed by: FAMILY MEDICINE

## 2018-06-26 PROCEDURE — 99213 OFFICE O/P EST LOW 20 MIN: CPT | Performed by: FAMILY MEDICINE

## 2018-06-26 NOTE — PATIENT INSTRUCTIONS
Degenerative Disc Disease   WHAT YOU NEED TO KNOW:   Degenerative disc disease happens when one or more discs between the vertebrae (bones in your spine) wear down  Discs act like a cushion between your vertebrae and help to stabilize your spine  Degenerative disc disease commonly occurs in the neck or lower back as you get older  DISCHARGE INSTRUCTIONS:   Medicines: You may need the following:  · NSAIDs , such as ibuprofen, help decrease swelling, pain, and fever  This medicine is available with or without a doctor's order  NSAIDs can cause stomach bleeding or kidney problems in certain people  If you take blood thinner medicine, always ask your healthcare provider if NSAIDs are safe for you  Always read the medicine label and follow directions  · Acetaminophen  decreases pain  It is available without a doctor's order  Ask how much to take and how often to take it  Follow directions  Acetaminophen can cause liver damage if not taken correctly  · Prescription pain medicine  may be given  Ask how to take this medicine safely  · Take your medicine as directed  Contact your healthcare provider if you think your medicine is not helping or if you have side effects  Tell him or her if you are allergic to any medicine  Keep a list of the medicines, vitamins, and herbs you take  Include the amounts, and when and why you take them  Bring the list or the pill bottles to follow-up visits  Carry your medicine list with you in case of an emergency  Follow up with your healthcare provider as directed:  Write down your questions so you remember to ask them during your visits  Go to physical therapy as directed:  A physical therapist will help you find stretches and exercises to decrease pain and improve movement and strength  He may also do spinal decompression to stretch and open the area between your vertebra  Manage your symptoms:   · Avoid activities that make your symptoms worse    Ask your healthcare provider for ways to decrease your symptoms  Certain stretches or exercises may relieve your symptoms  Ask how to stay active without further injury  · Apply heat or ice as directed  Heat or ice may help decrease pain, inflammation, and muscle spasms  · Maintain a healthy weight  If you are overweight, weight loss may help improve your symptoms  Ask your healthcare provider to help you create a weight loss plan if you are overweight  · Find ways to manage your stress  Behavioral therapy may help you learn ways to manage stress and decrease pain  Ask for more information about behavioral therapy  · Do not smoke  If you smoke, it is never too late to quit  Ask for information if you need help quitting  Contact your healthcare provider if:   · Your pain gets worse, or you cannot control it with pain medicine  · Your symptoms get worse  · You have questions or concerns about your condition or care  Seek care immediately if:   · You have severe pain or weakness, or you cannot move your arm or leg  · You lose control of your bladder or bowels  © 2017 [facility] Information is for End User's use only and may not be sold, redistributed or otherwise used for commercial purposes  All illustrations and images included in CareNotes® are the copyrighted property of [employer]  or Michelet Villagomez  The above information is an  only  It is not intended as medical advice for individual conditions or treatments  Talk to your doctor, nurse or pharmacist before following any medical regimen to see if it is safe and effective for you

## 2018-06-26 NOTE — PROGRESS NOTES
Assessment/Plan:    Acute right-sided low back pain without sciatica  Urine was negative for blood or leukocytes I suspect that patient has  A flare of her low back pain Patient will tkaet hte relafen tiwce daily for next 1 week if pain persists then she will follwoup here    Lumbar disc disease  Reviewed ehr MRI with her multiple disc issues        Diagnoses and all orders for this visit:    Generalized abdominal pain  -     POCT urine dip    Acute right-sided low back pain without sciatica    Lumbar disc disease          Subjective:   Chief Complaint   Patient presents with    Urinary Tract Infection          Patient ID: Virginei Hernandez is a 53 y o  female  Patient is here for pain in the low back on the right side Patient pain comes and goes It is a "shooting pain" Patient ahs hisgoty of kidney stone but that is different then this pain Patient MRI of spine shows a lot of lumbar disc disease She had it in March Patient pain occurs with cahgne of position and does not last long Patient see chiropractor patient wanted to rule out UTI or kidney stone She has no urinary complatin and no incontinence Patient did not try the relafen for her pain Patient last episode was last night when she rolled over in bed Patient denies any weakness in legs Her UA in office today is normal Her pain lasts a few minutes when it happens      Back Pain   This is a recurrent problem  The current episode started 1 to 4 weeks ago  The problem occurs 2 to 4 times per day  The problem is unchanged  The pain is present in the lumbar spine  The quality of the pain is described as shooting  The pain does not radiate  The pain is at a severity of 6/10  The pain is moderate  The pain is the same all the time  The symptoms are aggravated by bending  Stiffness is present all day   Pertinent negatives include no abdominal pain, bladder incontinence, bowel incontinence, chest pain, dysuria, fever, headaches, leg pain, numbness, paresis, paresthesias, pelvic pain, perianal numbness, tingling, weakness or weight loss  Risk factors include obesity, sedentary lifestyle and lack of exercise  She has tried chiropractic manipulation for the symptoms  The treatment provided mild relief  The following portions of the patient's history were reviewed and updated as appropriate: allergies, current medications, past social history and problem list     Review of Systems   Constitutional: Negative for fever and weight loss  Cardiovascular: Negative for chest pain  Gastrointestinal: Negative for abdominal distention, abdominal pain, bowel incontinence, diarrhea and nausea  Genitourinary: Negative for bladder incontinence, difficulty urinating, dysuria, enuresis, flank pain, frequency, hematuria and pelvic pain  Musculoskeletal: Positive for back pain and gait problem  Neurological: Negative for tingling, weakness, numbness, headaches and paresthesias  Objective:      /88   Temp 97 7 °F (36 5 °C)   Ht 5' 5 6" (1 666 m)   Wt 124 kg (273 lb)   BMI 44 60 kg/m²          Physical Exam   Constitutional: She is oriented to person, place, and time  She appears well-nourished  HENT:   Head: Normocephalic and atraumatic  Right Ear: External ear normal    Left Ear: External ear normal    Nose: Nose normal    Mouth/Throat: Oropharynx is clear and moist    Eyes: Conjunctivae and EOM are normal  Pupils are equal, round, and reactive to light  Neck: Normal range of motion  Neck supple  Cardiovascular: Normal rate, regular rhythm and normal heart sounds  Pulmonary/Chest: Breath sounds normal  She has no wheezes  She has no rales  Musculoskeletal:   Pain b/l PSIS and also pain to palpation right paraspinal muscles Patient has decrease in range of motion in all planes flexion is 45 and extension to 0 side bending and rotation are 30 degress b/l    Lymphadenopathy:     She has no cervical adenopathy     Neurological: She is alert and oriented to person, place, and time  She has normal reflexes  She displays normal reflexes  No cranial nerve deficit  She exhibits normal muscle tone  Coordination normal    Psychiatric: She has a normal mood and affect   Her behavior is normal  Judgment and thought content normal

## 2018-06-26 NOTE — ASSESSMENT & PLAN NOTE
Urine was negative for blood or leukocytes I suspect that patient has  A flare of her low back pain Patient will tkaet hte relafen tiwce daily for next 1 week if pain persists then she will follwoup here

## 2018-07-09 ENCOUNTER — HOSPITAL ENCOUNTER (OUTPATIENT)
Dept: MAMMOGRAPHY | Facility: HOSPITAL | Age: 48
Discharge: HOME | End: 2018-07-09

## 2018-07-09 DIAGNOSIS — Z12.39 SCREENING FOR BREAST CANCER: ICD-10-CM

## 2018-07-09 PROCEDURE — 77067 SCR MAMMO BI INCL CAD: CPT

## 2018-08-17 ENCOUNTER — TELEPHONE (OUTPATIENT)
Dept: FAMILY MEDICINE CLINIC | Facility: CLINIC | Age: 48
End: 2018-08-17

## 2018-08-17 NOTE — TELEPHONE ENCOUNTER
Contact her pharmacy  It may just be 1 generic brand  They should be able to substitute a different brand

## 2018-09-07 DIAGNOSIS — F41.8 ANXIETY WITH DEPRESSION: Primary | ICD-10-CM

## 2018-09-07 DIAGNOSIS — E03.9 ACQUIRED HYPOTHYROIDISM: Primary | ICD-10-CM

## 2018-09-07 DIAGNOSIS — I10 ESSENTIAL HYPERTENSION: ICD-10-CM

## 2018-09-07 RX ORDER — CITALOPRAM 20 MG/1
TABLET ORAL
Qty: 90 TABLET | Refills: 1 | Status: SHIPPED | OUTPATIENT
Start: 2018-09-07 | End: 2019-02-27 | Stop reason: SDUPTHER

## 2018-09-18 LAB
T4 SERPL-MCNC: 6.8 MCG/DL (ref 5.1–11.9)
TSH SERPL-ACNC: 4.87 MIU/L

## 2018-10-12 ENCOUNTER — TELEPHONE (OUTPATIENT)
Dept: FAMILY MEDICINE CLINIC | Facility: CLINIC | Age: 48
End: 2018-10-12

## 2018-10-12 DIAGNOSIS — M51.9 LUMBAR DISC DISEASE: Primary | ICD-10-CM

## 2018-10-12 RX ORDER — PREDNISONE 10 MG/1
TABLET ORAL
Qty: 30 TABLET | Refills: 0 | Status: SHIPPED | OUTPATIENT
Start: 2018-10-12 | End: 2018-12-17

## 2018-12-10 ENCOUNTER — TELEPHONE (OUTPATIENT)
Dept: FAMILY MEDICINE CLINIC | Facility: CLINIC | Age: 48
End: 2018-12-10

## 2018-12-10 NOTE — TELEPHONE ENCOUNTER
I received a disability form, I did disabled her  I have not seen her for the problem that prevents her from working  She needs come in and see me, or she needs to get the doctor who is keeping her out of work to fill out the paperwork which is a more appropriate approach

## 2018-12-17 ENCOUNTER — OFFICE VISIT (OUTPATIENT)
Dept: FAMILY MEDICINE CLINIC | Facility: CLINIC | Age: 48
End: 2018-12-17

## 2018-12-17 VITALS
BODY MASS INDEX: 45.8 KG/M2 | SYSTOLIC BLOOD PRESSURE: 124 MMHG | HEIGHT: 66 IN | WEIGHT: 285 LBS | DIASTOLIC BLOOD PRESSURE: 80 MMHG

## 2018-12-17 DIAGNOSIS — M54.16 LUMBAR RADICULOPATHY: ICD-10-CM

## 2018-12-17 DIAGNOSIS — M51.9 LUMBAR DISC DISEASE: ICD-10-CM

## 2018-12-17 DIAGNOSIS — G89.29 CHRONIC RIGHT-SIDED LOW BACK PAIN WITHOUT SCIATICA: ICD-10-CM

## 2018-12-17 DIAGNOSIS — M54.50 CHRONIC RIGHT-SIDED LOW BACK PAIN WITHOUT SCIATICA: ICD-10-CM

## 2018-12-17 DIAGNOSIS — F41.1 GENERALIZED ANXIETY DISORDER: ICD-10-CM

## 2018-12-17 DIAGNOSIS — I10 ESSENTIAL HYPERTENSION: ICD-10-CM

## 2018-12-17 DIAGNOSIS — E03.9 ACQUIRED HYPOTHYROIDISM: Primary | ICD-10-CM

## 2018-12-17 PROCEDURE — 99213 OFFICE O/P EST LOW 20 MIN: CPT | Performed by: FAMILY MEDICINE

## 2018-12-17 RX ORDER — METHOCARBAMOL 500 MG/1
500 TABLET, FILM COATED ORAL 4 TIMES DAILY PRN
COMMUNITY
Start: 2018-10-22 | End: 2019-04-15

## 2018-12-17 RX ORDER — MELOXICAM 15 MG/1
15 TABLET ORAL DAILY
COMMUNITY
End: 2020-10-30

## 2018-12-17 NOTE — PROGRESS NOTES
Assessment/Plan:    Essential hypertension  Controlled on Lopressor 25 mg b i d   Continue same  Recheck in 6 months  Due for lab work  Acquired hypothyroidism  Due for lab, continue levothyroxine 100 mcg daily  Staff will call her with results and any adjustments we may need to make  Diagnoses and all orders for this visit:    Acquired hypothyroidism  -     TSH, 3rd generation; Future  -     T4; Future    Essential hypertension  -     CBC and differential; Future  -     Comprehensive metabolic panel; Future  -     Lipid panel; Future  -     Urinalysis with reflex to microscopic    Chronic right-sided low back pain without sciatica    Lumbar disc disease    Generalized anxiety disorder    Lumbar radiculopathy    Other orders  -     meloxicam (MOBIC) 15 mg tablet; Take 15 mg by mouth daily  -     methocarbamol (ROBAXIN) 500 mg tablet; Take 500 mg by mouth 4 (four) times a day as needed          Subjective:   Chief Complaint   Patient presents with    Hypothyroidism    Hypertension     no refills needed  Patient ID: Feroz Chang is a 53 y o  female  Patient is a 21-year-old female presenting to the office for follow-up on hypothyroidism, hypertension, back problems, and general health  She did get a Pap smear and mammogram earlier this year  Recently had a worsening of her lower back pain, went and saw pain management and got a shot in her back  Things have improved dramatically since then  She is applying for a low income insurance and needs paperwork filled out  Overall, she feels she is doing well  Her anxiety is well controlled on citalopram 20 mg daily  She is not doing so well with diet and exercise          The following portions of the patient's history were reviewed and updated as appropriate: allergies, current medications, past family history, past medical history, past social history, past surgical history and problem list     Review of Systems   Constitutional: Positive for unexpected weight change  Negative for activity change, chills, diaphoresis, fatigue and fever  HENT: Negative for congestion, ear pain, hearing loss, nosebleeds, postnasal drip, rhinorrhea, sinus pressure, sore throat and tinnitus  Eyes: Negative for photophobia, pain, discharge, redness and visual disturbance  Respiratory: Negative for cough, chest tightness, shortness of breath and wheezing  Cardiovascular: Negative for chest pain, palpitations and leg swelling  Denies ARRIOLA   Gastrointestinal: Negative for abdominal pain, blood in stool, constipation, diarrhea, nausea and vomiting  Endocrine: Negative  Genitourinary: Negative for difficulty urinating, dysuria, frequency, hematuria and urgency  Status post hysterectomy  Musculoskeletal: Positive for back pain (Seeing Pain Management  )  Negative for arthralgias, joint swelling and myalgias  Skin: Negative for rash and wound  Denies skin lesions, change in birthmarks   Allergic/Immunologic: Negative for environmental allergies, food allergies and immunocompromised state  Neurological: Negative for dizziness, tremors, seizures, syncope, weakness, numbness and headaches  Hematological: Negative  Psychiatric/Behavioral: Negative for behavioral problems, confusion, decreased concentration and sleep disturbance  The patient is nervous/anxious  Well controlled on current medications  Objective:      /80   Ht 5' 6" (1 676 m)   Wt 129 kg (285 lb)   BMI 46 00 kg/m²          Physical Exam   Constitutional: She is oriented to person, place, and time  She appears well-developed and well-nourished  No distress  Morbidly obese   HENT:   Head: Normocephalic and atraumatic  Right Ear: External ear normal    Left Ear: External ear normal    Nose: Nose normal    Mouth/Throat: Oropharynx is clear and moist    Eyes: Pupils are equal, round, and reactive to light   Conjunctivae and EOM are normal  Neck: Normal range of motion  Neck supple  No JVD present  No tracheal deviation present  No thyromegaly present  Cardiovascular: Normal rate, regular rhythm and normal heart sounds  No murmur heard  Pulmonary/Chest: Effort normal and breath sounds normal  She has no wheezes  She has no rales  Abdominal: Soft  Bowel sounds are normal  She exhibits no mass  There is no tenderness  There is no rebound and no guarding  Musculoskeletal: Normal range of motion  She exhibits no edema or tenderness  Lymphadenopathy:     She has no cervical adenopathy  Neurological: She is alert and oriented to person, place, and time  She has normal reflexes  No cranial nerve deficit  Skin: Skin is warm and dry  No lesion and no rash noted  Psychiatric: She has a normal mood and affect  Judgment normal      BMI Counseling: Body mass index is 46 kg/m²  Discussed the patient's BMI with her  The BMI is above average  BMI counseling and education was provided to the patient  Nutrition recommendations include reducing portion sizes, decreasing overall calorie intake, 3-5 servings of fruits/vegetables daily, reducing fast food intake, consuming healthier snacks, decreasing soda and/or juice intake, moderation in carbohydrate intake, increasing intake of lean protein, reducing intake of saturated fat and trans fat and reducing intake of cholesterol  Exercise recommendations include moderate aerobic physical activity for 150 minutes/week and exercising 3-5 times per week

## 2018-12-17 NOTE — ASSESSMENT & PLAN NOTE
Due for lab, continue levothyroxine 100 mcg daily  Staff will call her with results and any adjustments we may need to make

## 2019-01-25 ENCOUNTER — TELEPHONE (OUTPATIENT)
Dept: FAMILY MEDICINE CLINIC | Facility: CLINIC | Age: 49
End: 2019-01-25

## 2019-01-29 DIAGNOSIS — E03.9 ACQUIRED HYPOTHYROIDISM: Primary | ICD-10-CM

## 2019-01-29 RX ORDER — LEVOTHYROXINE SODIUM 0.1 MG/1
TABLET ORAL
Qty: 90 TABLET | Refills: 1 | Status: SHIPPED | OUTPATIENT
Start: 2019-01-29 | End: 2019-04-15 | Stop reason: SDUPTHER

## 2019-02-27 DIAGNOSIS — F41.8 ANXIETY WITH DEPRESSION: ICD-10-CM

## 2019-02-27 RX ORDER — CITALOPRAM 20 MG/1
TABLET ORAL
Qty: 90 TABLET | Refills: 1 | Status: SHIPPED | OUTPATIENT
Start: 2019-02-27 | End: 2019-09-17 | Stop reason: SDUPTHER

## 2019-03-11 DIAGNOSIS — I10 ESSENTIAL HYPERTENSION: ICD-10-CM

## 2019-04-01 ENCOUNTER — TELEPHONE (OUTPATIENT)
Dept: FAMILY MEDICINE CLINIC | Facility: CLINIC | Age: 49
End: 2019-04-01

## 2019-04-06 DIAGNOSIS — I10 ESSENTIAL HYPERTENSION: ICD-10-CM

## 2019-04-06 DIAGNOSIS — E03.9 ACQUIRED HYPOTHYROIDISM: Primary | ICD-10-CM

## 2019-04-15 ENCOUNTER — OFFICE VISIT (OUTPATIENT)
Dept: FAMILY MEDICINE CLINIC | Facility: CLINIC | Age: 49
End: 2019-04-15
Payer: COMMERCIAL

## 2019-04-15 ENCOUNTER — APPOINTMENT (OUTPATIENT)
Dept: LAB | Facility: HOSPITAL | Age: 49
End: 2019-04-15
Payer: COMMERCIAL

## 2019-04-15 VITALS
TEMPERATURE: 98.4 F | BODY MASS INDEX: 43.39 KG/M2 | DIASTOLIC BLOOD PRESSURE: 80 MMHG | SYSTOLIC BLOOD PRESSURE: 128 MMHG | HEIGHT: 66 IN | WEIGHT: 270 LBS

## 2019-04-15 DIAGNOSIS — E03.9 ACQUIRED HYPOTHYROIDISM: ICD-10-CM

## 2019-04-15 DIAGNOSIS — J01.00 ACUTE NON-RECURRENT MAXILLARY SINUSITIS: Primary | ICD-10-CM

## 2019-04-15 DIAGNOSIS — I10 ESSENTIAL HYPERTENSION: ICD-10-CM

## 2019-04-15 LAB
ALBUMIN SERPL BCP-MCNC: 3.8 G/DL (ref 3.5–5)
ALP SERPL-CCNC: 73 U/L (ref 46–116)
ALT SERPL W P-5'-P-CCNC: 23 U/L (ref 12–78)
ANION GAP SERPL CALCULATED.3IONS-SCNC: 8 MMOL/L (ref 4–13)
AST SERPL W P-5'-P-CCNC: 22 U/L (ref 5–45)
BASOPHILS # BLD AUTO: 0.02 THOUSANDS/ΜL (ref 0–0.1)
BASOPHILS NFR BLD AUTO: 0 % (ref 0–1)
BILIRUB SERPL-MCNC: 0.49 MG/DL (ref 0.2–1)
BILIRUB UR QL STRIP: NEGATIVE
BUN SERPL-MCNC: 21 MG/DL (ref 5–25)
CALCIUM SERPL-MCNC: 8.9 MG/DL (ref 8.3–10.1)
CHLORIDE SERPL-SCNC: 102 MMOL/L (ref 100–108)
CHOLEST SERPL-MCNC: 191 MG/DL (ref 50–200)
CLARITY UR: NORMAL
CO2 SERPL-SCNC: 30 MMOL/L (ref 21–32)
COLOR UR: YELLOW
CREAT SERPL-MCNC: 0.91 MG/DL (ref 0.6–1.3)
EOSINOPHIL # BLD AUTO: 0.2 THOUSAND/ΜL (ref 0–0.61)
EOSINOPHIL NFR BLD AUTO: 4 % (ref 0–6)
ERYTHROCYTE [DISTWIDTH] IN BLOOD BY AUTOMATED COUNT: 12.8 % (ref 11.6–15.1)
GFR SERPL CREATININE-BSD FRML MDRD: 75 ML/MIN/1.73SQ M
GLUCOSE P FAST SERPL-MCNC: 91 MG/DL (ref 65–99)
GLUCOSE UR STRIP-MCNC: NEGATIVE MG/DL
HCT VFR BLD AUTO: 41.6 % (ref 34.8–46.1)
HDLC SERPL-MCNC: 47 MG/DL (ref 40–60)
HGB BLD-MCNC: 13.4 G/DL (ref 11.5–15.4)
HGB UR QL STRIP.AUTO: NEGATIVE
IMM GRANULOCYTES # BLD AUTO: 0.01 THOUSAND/UL (ref 0–0.2)
IMM GRANULOCYTES NFR BLD AUTO: 0 % (ref 0–2)
KETONES UR STRIP-MCNC: NEGATIVE MG/DL
LDLC SERPL CALC-MCNC: 114 MG/DL (ref 0–100)
LEUKOCYTE ESTERASE UR QL STRIP: NEGATIVE
LYMPHOCYTES # BLD AUTO: 2.14 THOUSANDS/ΜL (ref 0.6–4.47)
LYMPHOCYTES NFR BLD AUTO: 38 % (ref 14–44)
MCH RBC QN AUTO: 27.3 PG (ref 26.8–34.3)
MCHC RBC AUTO-ENTMCNC: 32.2 G/DL (ref 31.4–37.4)
MCV RBC AUTO: 85 FL (ref 82–98)
MONOCYTES # BLD AUTO: 0.4 THOUSAND/ΜL (ref 0.17–1.22)
MONOCYTES NFR BLD AUTO: 7 % (ref 4–12)
NEUTROPHILS # BLD AUTO: 2.81 THOUSANDS/ΜL (ref 1.85–7.62)
NEUTS SEG NFR BLD AUTO: 51 % (ref 43–75)
NITRITE UR QL STRIP: NEGATIVE
NONHDLC SERPL-MCNC: 144 MG/DL
NRBC BLD AUTO-RTO: 0 /100 WBCS
PH UR STRIP.AUTO: 5.5 [PH]
PLATELET # BLD AUTO: 220 THOUSANDS/UL (ref 149–390)
PMV BLD AUTO: 9.4 FL (ref 8.9–12.7)
POTASSIUM SERPL-SCNC: 4 MMOL/L (ref 3.5–5.3)
PROT SERPL-MCNC: 7.4 G/DL (ref 6.4–8.2)
PROT UR STRIP-MCNC: NEGATIVE MG/DL
RBC # BLD AUTO: 4.91 MILLION/UL (ref 3.81–5.12)
SODIUM SERPL-SCNC: 140 MMOL/L (ref 136–145)
SP GR UR STRIP.AUTO: >=1.03 (ref 1–1.03)
T4 SERPL-MCNC: 7.2 UG/DL (ref 4.7–13.3)
TRIGL SERPL-MCNC: 148 MG/DL
TSH SERPL DL<=0.05 MIU/L-ACNC: 5.38 UIU/ML (ref 0.36–3.74)
UROBILINOGEN UR QL STRIP.AUTO: 0.2 E.U./DL
WBC # BLD AUTO: 5.58 THOUSAND/UL (ref 4.31–10.16)

## 2019-04-15 PROCEDURE — 84443 ASSAY THYROID STIM HORMONE: CPT

## 2019-04-15 PROCEDURE — 1036F TOBACCO NON-USER: CPT | Performed by: FAMILY MEDICINE

## 2019-04-15 PROCEDURE — 3008F BODY MASS INDEX DOCD: CPT | Performed by: FAMILY MEDICINE

## 2019-04-15 PROCEDURE — 85025 COMPLETE CBC W/AUTO DIFF WBC: CPT

## 2019-04-15 PROCEDURE — 99213 OFFICE O/P EST LOW 20 MIN: CPT | Performed by: FAMILY MEDICINE

## 2019-04-15 PROCEDURE — 81003 URINALYSIS AUTO W/O SCOPE: CPT | Performed by: FAMILY MEDICINE

## 2019-04-15 PROCEDURE — 80053 COMPREHEN METABOLIC PANEL: CPT

## 2019-04-15 PROCEDURE — 80061 LIPID PANEL: CPT

## 2019-04-15 PROCEDURE — 84436 ASSAY OF TOTAL THYROXINE: CPT

## 2019-04-15 PROCEDURE — 36415 COLL VENOUS BLD VENIPUNCTURE: CPT

## 2019-04-15 RX ORDER — BENZONATATE 200 MG/1
200 CAPSULE ORAL 3 TIMES DAILY PRN
Qty: 20 CAPSULE | Refills: 0 | Status: SHIPPED | OUTPATIENT
Start: 2019-04-15 | End: 2019-04-29

## 2019-04-15 RX ORDER — CEFUROXIME AXETIL 500 MG/1
500 TABLET ORAL EVERY 12 HOURS SCHEDULED
Qty: 20 TABLET | Refills: 0 | Status: SHIPPED | OUTPATIENT
Start: 2019-04-15 | End: 2019-04-29

## 2019-04-15 RX ORDER — LEVOTHYROXINE SODIUM 0.12 MG/1
125 TABLET ORAL DAILY
Qty: 90 TABLET | Refills: 1 | Status: SHIPPED | OUTPATIENT
Start: 2019-04-15 | End: 2019-12-01 | Stop reason: SDUPTHER

## 2019-04-29 ENCOUNTER — OFFICE VISIT (OUTPATIENT)
Dept: FAMILY MEDICINE CLINIC | Facility: CLINIC | Age: 49
End: 2019-04-29
Payer: COMMERCIAL

## 2019-04-29 VITALS
HEIGHT: 66 IN | DIASTOLIC BLOOD PRESSURE: 80 MMHG | BODY MASS INDEX: 44.45 KG/M2 | WEIGHT: 276.6 LBS | SYSTOLIC BLOOD PRESSURE: 118 MMHG

## 2019-04-29 DIAGNOSIS — I10 ESSENTIAL HYPERTENSION: Primary | ICD-10-CM

## 2019-04-29 DIAGNOSIS — F41.1 GENERALIZED ANXIETY DISORDER: ICD-10-CM

## 2019-04-29 DIAGNOSIS — E03.9 ACQUIRED HYPOTHYROIDISM: ICD-10-CM

## 2019-04-29 PROBLEM — E66.01 MORBID OBESITY WITH BMI OF 40.0-44.9, ADULT (HCC): Status: ACTIVE | Noted: 2019-04-29

## 2019-04-29 PROBLEM — Z01.419 ENCOUNTER FOR ANNUAL ROUTINE GYNECOLOGICAL EXAMINATION: Status: RESOLVED | Noted: 2018-06-19 | Resolved: 2019-04-29

## 2019-04-29 PROCEDURE — 99214 OFFICE O/P EST MOD 30 MIN: CPT | Performed by: FAMILY MEDICINE

## 2019-04-29 PROCEDURE — 1036F TOBACCO NON-USER: CPT | Performed by: FAMILY MEDICINE

## 2019-04-29 PROCEDURE — 3079F DIAST BP 80-89 MM HG: CPT | Performed by: FAMILY MEDICINE

## 2019-04-29 PROCEDURE — 3725F SCREEN DEPRESSION PERFORMED: CPT | Performed by: FAMILY MEDICINE

## 2019-04-29 PROCEDURE — 3074F SYST BP LT 130 MM HG: CPT | Performed by: FAMILY MEDICINE

## 2019-04-29 PROCEDURE — 3008F BODY MASS INDEX DOCD: CPT | Performed by: FAMILY MEDICINE

## 2019-07-08 ENCOUNTER — ANNUAL EXAM (OUTPATIENT)
Dept: OBGYN CLINIC | Facility: CLINIC | Age: 49
End: 2019-07-08

## 2019-07-08 VITALS
SYSTOLIC BLOOD PRESSURE: 123 MMHG | BODY MASS INDEX: 43.68 KG/M2 | WEIGHT: 270.6 LBS | DIASTOLIC BLOOD PRESSURE: 79 MMHG | HEART RATE: 69 BPM

## 2019-07-08 DIAGNOSIS — M54.50 CHRONIC RIGHT-SIDED LOW BACK PAIN WITHOUT SCIATICA: Primary | ICD-10-CM

## 2019-07-08 DIAGNOSIS — G89.29 CHRONIC RIGHT-SIDED LOW BACK PAIN WITHOUT SCIATICA: Primary | ICD-10-CM

## 2019-07-08 DIAGNOSIS — Z79.890 SURGICAL MENOPAUSE ON HORMONE REPLACEMENT THERAPY: ICD-10-CM

## 2019-07-08 DIAGNOSIS — Z01.419 ENCOUNTER FOR ANNUAL ROUTINE GYNECOLOGICAL EXAMINATION: Primary | ICD-10-CM

## 2019-07-08 DIAGNOSIS — Z12.31 ENCOUNTER FOR SCREENING MAMMOGRAM FOR MALIGNANT NEOPLASM OF BREAST: ICD-10-CM

## 2019-07-08 DIAGNOSIS — E89.40 SURGICAL MENOPAUSE ON HORMONE REPLACEMENT THERAPY: ICD-10-CM

## 2019-07-08 PROCEDURE — 99396 PREV VISIT EST AGE 40-64: CPT | Performed by: NURSE PRACTITIONER

## 2019-07-08 PROCEDURE — 3725F SCREEN DEPRESSION PERFORMED: CPT | Performed by: NURSE PRACTITIONER

## 2019-07-08 RX ORDER — NABUMETONE 500 MG/1
TABLET, FILM COATED ORAL
Qty: 180 TABLET | Refills: 1 | OUTPATIENT
Start: 2019-07-08

## 2019-07-08 RX ORDER — ESTRADIOL 0.5 MG/1
0.5 TABLET ORAL DAILY
Qty: 30 TABLET | Refills: 11 | Status: SHIPPED | OUTPATIENT
Start: 2019-07-08 | End: 2020-07-13

## 2019-07-08 NOTE — PROGRESS NOTES
Assessment/Plan     Diagnoses and all orders for this visit:    Encounter for annual routine gynecological examination    Encounter for screening mammogram for malignant neoplasm of breast  -     Mammo screening bilateral w cad; Future    Surgical menopause on hormone replacement therapy  -     estradiol (ESTRACE) 0 5 MG tablet; Take 1 tablet (0 5 mg total) by mouth daily         Plan  Patient Instructions   Continue Estrace as directed  Make mammogram appointment  Call with needs or concerns  Return in 1 year  Pt verbalized understanding of all discussed  Return in about 1 year (around 2020)  Leno Boo is a 51 y o  female who presents for annual exam  The patient has no complaints today  The patient is not sexually active  No current partner,  last intercourse was 2 years  GYN screening history: last pap: was normal,  HPV negative    The patient is taking hormone replacement therapy  Pt had a MIGUEL BSO, several years ago, is taking Estrace 1 mg, would like to try 1/2 mg  Pt is not having menopausal symptoms on 1 mg  Patient denies post-menopausal vaginal bleeding    The patient participates in regular exercise: no discuss Calcium and vitamin D and the benefits of exercise  The patient reports that there is not domestic violence in her life  The patient is not having menopausal symptoms none     Menstrual History:  OB History        3    Para        Term                AB   1    Living   2       SAB        TAB   1    Ectopic        Multiple        Live Births               Obstetric Comments   MENOPAUSE            Menarche age: 13  No LMP recorded  Patient has had a hysterectomy  The following portions of the patient's history were reviewed and updated as appropriate: allergies, current medications, past family history, past medical history, past social history, past surgical history and problem list     Review of Systems  Pertinent items are noted in HPI  Objective      /79   Pulse 69   Wt 123 kg (270 lb 9 6 oz)   BMI 43 68 kg/m²      General:   alert and oriented, in no acute distress, alert, appears stated age and cooperative   Heart: regular rate and rhythm, S1, S2 normal, no murmur, click, rub or gallop   Lungs: clear to auscultation bilaterally   Thyroid: Negative masses   Abdomen: soft, non-tender, without masses or organomegaly   Vulva: normal   Vagina: normal mucosa   Cervix: no cervical motion tenderness and no lesions   Uterus: normal size, non-tender, normal shape and consistency   Adnexa: normal adnexa   Breasts: NT, negative masses, discharge or dimpling         Lab Review  mammogram ordered today

## 2019-07-08 NOTE — PATIENT INSTRUCTIONS
Continue Estrace as directed  Make mammogram appointment  Call with needs or concerns  Return in 1 year

## 2019-07-29 ENCOUNTER — HOSPITAL ENCOUNTER (OUTPATIENT)
Dept: MAMMOGRAPHY | Facility: CLINIC | Age: 49
Discharge: HOME | End: 2019-07-29
Payer: COMMERCIAL

## 2019-07-29 VITALS — BODY MASS INDEX: 43.39 KG/M2 | WEIGHT: 270 LBS | HEIGHT: 66 IN

## 2019-07-29 DIAGNOSIS — Z12.31 ENCOUNTER FOR SCREENING MAMMOGRAM FOR MALIGNANT NEOPLASM OF BREAST: ICD-10-CM

## 2019-07-29 PROCEDURE — 77067 SCR MAMMO BI INCL CAD: CPT

## 2019-07-30 ENCOUNTER — TELEPHONE (OUTPATIENT)
Dept: OBGYN CLINIC | Facility: CLINIC | Age: 49
End: 2019-07-30

## 2019-07-30 ENCOUNTER — DOCUMENTATION (OUTPATIENT)
Dept: OBGYN CLINIC | Facility: CLINIC | Age: 49
End: 2019-07-30

## 2019-09-17 DIAGNOSIS — F41.8 ANXIETY WITH DEPRESSION: ICD-10-CM

## 2019-09-17 DIAGNOSIS — I10 ESSENTIAL HYPERTENSION: ICD-10-CM

## 2019-09-17 RX ORDER — CITALOPRAM 20 MG/1
TABLET ORAL
Qty: 90 TABLET | Refills: 1 | Status: SHIPPED | OUTPATIENT
Start: 2019-09-17 | End: 2020-03-14

## 2019-12-01 DIAGNOSIS — E03.9 ACQUIRED HYPOTHYROIDISM: ICD-10-CM

## 2019-12-01 RX ORDER — LEVOTHYROXINE SODIUM 0.12 MG/1
TABLET ORAL
Qty: 90 TABLET | Refills: 0 | Status: SHIPPED | OUTPATIENT
Start: 2019-12-01 | End: 2020-03-14

## 2019-12-03 ENCOUNTER — OFFICE VISIT (OUTPATIENT)
Dept: FAMILY MEDICINE CLINIC | Facility: CLINIC | Age: 49
End: 2019-12-03
Payer: COMMERCIAL

## 2019-12-03 VITALS
HEIGHT: 66 IN | WEIGHT: 272.2 LBS | SYSTOLIC BLOOD PRESSURE: 128 MMHG | DIASTOLIC BLOOD PRESSURE: 84 MMHG | BODY MASS INDEX: 43.75 KG/M2

## 2019-12-03 DIAGNOSIS — E66.01 MORBID OBESITY WITH BMI OF 40.0-44.9, ADULT (HCC): ICD-10-CM

## 2019-12-03 DIAGNOSIS — I10 ESSENTIAL HYPERTENSION: ICD-10-CM

## 2019-12-03 DIAGNOSIS — E78.1 HYPERTRIGLYCERIDEMIA: ICD-10-CM

## 2019-12-03 DIAGNOSIS — E03.9 ACQUIRED HYPOTHYROIDISM: Primary | ICD-10-CM

## 2019-12-03 PROBLEM — Z12.31 ENCOUNTER FOR SCREENING MAMMOGRAM FOR MALIGNANT NEOPLASM OF BREAST: Status: RESOLVED | Noted: 2019-07-08 | Resolved: 2019-12-03

## 2019-12-03 PROBLEM — F12.90 MARIJUANA USE, EPISODIC: Status: ACTIVE | Noted: 2019-08-19

## 2019-12-03 PROCEDURE — 3074F SYST BP LT 130 MM HG: CPT | Performed by: FAMILY MEDICINE

## 2019-12-03 PROCEDURE — 99214 OFFICE O/P EST MOD 30 MIN: CPT | Performed by: FAMILY MEDICINE

## 2019-12-03 PROCEDURE — 3079F DIAST BP 80-89 MM HG: CPT | Performed by: FAMILY MEDICINE

## 2019-12-03 PROCEDURE — 36415 COLL VENOUS BLD VENIPUNCTURE: CPT | Performed by: FAMILY MEDICINE

## 2019-12-03 RX ORDER — METHOCARBAMOL 500 MG/1
TABLET, FILM COATED ORAL AS NEEDED
COMMUNITY
Start: 2019-11-27

## 2019-12-03 NOTE — ASSESSMENT & PLAN NOTE
Lab drawn in the office today  Staff will contact her with the  Results  Continue levothyroxine 125 mcg for now

## 2019-12-03 NOTE — PROGRESS NOTES
Assessment/Plan:    Acquired hypothyroidism    Lab drawn in the office today  Staff will contact her with the  Results  Continue levothyroxine 125 mcg for now  Essential hypertension    Well controlled on metoprolol 25 mg twice daily  Continue same  Recheck in 6 months  Generalized anxiety disorder    Continue citalopram 20 mg daily  Recheck in 6 months    Hypertriglyceridemia   Lab drawn in the office today  Recheck in 6 months  Morbid obesity with BMI of 40 0-44 9, adult ([address] )    Diet and exercise discussed  Following up with the bariatric center  Diagnoses and all orders for this visit:    Acquired hypothyroidism  -     Cancel: Comprehensive metabolic panel; Future  -     Cancel: CBC and differential; Future  -     Cancel: Lipid panel; Future  -     Cancel: TSH, 3rd generation; Future  -     Cancel: T4; Future  -     TSH, 3rd generation  -     T4    Morbid obesity with BMI of 40 0-44 9, adult ([facility])  -     Comprehensive metabolic panel  -     CBC and differential  -     TSH, 3rd generation    Essential hypertension  -     Cancel: Lipid panel; Future  -     Comprehensive metabolic panel  -     CBC and differential  -     Lipid panel    Hypertriglyceridemia    Other orders  -     methocarbamol (ROBAXIN) 500 mg tablet          Subjective:   Chief Complaint   Patient presents with    Hypothyroidism    Hypertension    Anxiety          Patient ID: Rohan Soliz is a 51 y o  female  She is here for follow-up on her thyroid, diet-controlled hypertension, weight, and a general checkup  Her anxiety is well controlled on citalopram   She has been seeing the bariatric program at [facility], and is anticipating having a sleeve procedure done early next year        The following portions of the patient's history were reviewed and updated as appropriate: allergies, current medications, past family history, past medical history, past social history, past surgical history and problem list     Review of Systems   Constitutional: Negative for activity change, chills, diaphoresis, fatigue, fever and unexpected weight change (  6 lb designed weight loss)  HENT: Negative for congestion, ear pain, hearing loss, nosebleeds, postnasal drip, rhinorrhea, sinus pressure, sore throat and tinnitus  Eyes: Negative for photophobia, pain, discharge, redness and visual disturbance  Respiratory: Negative for cough, chest tightness, shortness of breath and wheezing  Cardiovascular: Negative for chest pain, palpitations and leg swelling  Denies ARRIOLA   Gastrointestinal: Negative for abdominal pain, blood in stool, constipation, diarrhea, nausea and vomiting  Endocrine: Negative  Genitourinary: Negative for difficulty urinating, dysuria, frequency, hematuria and urgency  Denies dysmenorrhea, menstrual difficulties   Musculoskeletal: Negative for arthralgias, joint swelling and myalgias  Skin: Negative for rash and wound  Denies skin lesions, change in birthmarks   Allergic/Immunologic: Negative for environmental allergies, food allergies and immunocompromised state  Neurological: Negative for dizziness, tremors, seizures, syncope, weakness, numbness and headaches  Hematological: Negative  Psychiatric/Behavioral: Negative for behavioral problems, confusion, decreased concentration and sleep disturbance  The patient is not nervous/anxious  Objective:      /84   Ht 5' 6" (1 676 m)   Wt 123 kg (272 lb 3 2 oz)   BMI 43 93 kg/m²          Physical Exam   Constitutional: She is oriented to person, place, and time  She appears well-developed and well-nourished  No distress  obese   HENT:   Head: Normocephalic and atraumatic  Right Ear: External ear normal    Left Ear: External ear normal    Nose: Nose normal    Mouth/Throat: Oropharynx is clear and moist    Eyes: Pupils are equal, round, and reactive to light   Conjunctivae and EOM are normal    Neck: Normal range of motion  Neck supple  No JVD present  No tracheal deviation present  No thyromegaly present  Cardiovascular: Normal rate, regular rhythm and normal heart sounds  No murmur heard  Pulmonary/Chest: Effort normal and breath sounds normal  She has no wheezes  She has no rales  Abdominal: Soft  Bowel sounds are normal  She exhibits no mass  There is no tenderness  There is no rebound and no guarding  Musculoskeletal: Normal range of motion  She exhibits no edema or tenderness  Lymphadenopathy:     She has no cervical adenopathy  Neurological: She is alert and oriented to person, place, and time  She has normal reflexes  No cranial nerve deficit  Skin: Skin is warm and dry  No lesion and no rash noted  Psychiatric: She has a normal mood and affect  Her behavior is normal  Judgment and thought content normal    Nursing note and vitals reviewed  BMI Counseling: Body mass index is 43 93 kg/m²  The BMI is above normal  Nutrition recommendations include reducing portion sizes, decreasing overall calorie intake, 3-5 servings of fruits/vegetables daily, reducing fast food intake, consuming healthier snacks, decreasing soda and/or juice intake, moderation in carbohydrate intake, increasing intake of lean protein and reducing intake of saturated fat and trans fat  Exercise recommendations include exercising 3-5 times per week  Patient referred to bariatric surgery due to patient being morbidly obese

## 2019-12-04 ENCOUNTER — TELEPHONE (OUTPATIENT)
Dept: FAMILY MEDICINE CLINIC | Facility: CLINIC | Age: 49
End: 2019-12-04

## 2019-12-04 NOTE — TELEPHONE ENCOUNTER
----- Message from Kylee Aguillon DO sent at 12/4/2019  8:48 AM EST -----  TSH is better, where it should be  Cholesterol is acceptable    Blood count and chemistry are normal   Continue current medications, recheck lab in 6 months

## 2019-12-05 LAB
ALBUMIN SERPL-MCNC: 4.3 G/DL (ref 3.6–5.1)
ALBUMIN/GLOB SERPL: 1.7 (CALC) (ref 1–2.5)
ALP SERPL-CCNC: 65 U/L (ref 33–115)
ALT SERPL-CCNC: 17 U/L (ref 6–29)
AST SERPL-CCNC: 19 U/L (ref 10–35)
BASOPHILS # BLD AUTO: 20 CELLS/UL (ref 0–200)
BASOPHILS NFR BLD AUTO: 0.4 %
BILIRUB SERPL-MCNC: 0.4 MG/DL (ref 0.2–1.2)
BUN SERPL-MCNC: 26 MG/DL (ref 7–25)
BUN/CREAT SERPL: 27 (CALC) (ref 6–22)
CALCIUM SERPL-MCNC: 9.3 MG/DL (ref 8.6–10.2)
CHLORIDE SERPL-SCNC: 103 MMOL/L (ref 98–110)
CHOLEST SERPL-MCNC: 206 MG/DL
CHOLEST/HDLC SERPL: 4 (CALC)
CO2 SERPL-SCNC: 27 MMOL/L (ref 20–32)
CREAT SERPL-MCNC: 0.96 MG/DL (ref 0.5–1.1)
EOSINOPHIL # BLD AUTO: 128 CELLS/UL (ref 15–500)
EOSINOPHIL NFR BLD AUTO: 2.5 %
ERYTHROCYTE [DISTWIDTH] IN BLOOD BY AUTOMATED COUNT: 13 % (ref 11–15)
GLOBULIN SER CALC-MCNC: 2.5 G/DL (CALC) (ref 1.9–3.7)
GLUCOSE SERPL-MCNC: 103 MG/DL (ref 65–99)
HCT VFR BLD AUTO: 41.1 % (ref 35–45)
HDLC SERPL-MCNC: 51 MG/DL
HGB BLD-MCNC: 13.4 G/DL (ref 11.7–15.5)
LDLC SERPL CALC-MCNC: 120 MG/DL (CALC)
LYMPHOCYTES # BLD AUTO: 2009 CELLS/UL (ref 850–3900)
LYMPHOCYTES NFR BLD AUTO: 39.4 %
MCH RBC QN AUTO: 26.9 PG (ref 27–33)
MCHC RBC AUTO-ENTMCNC: 32.6 G/DL (ref 32–36)
MCV RBC AUTO: 82.5 FL (ref 80–100)
MONOCYTES # BLD AUTO: 362 CELLS/UL (ref 200–950)
MONOCYTES NFR BLD AUTO: 7.1 %
NEUTROPHILS # BLD AUTO: 2581 CELLS/UL (ref 1500–7800)
NEUTROPHILS NFR BLD AUTO: 50.6 %
NONHDLC SERPL-MCNC: 155 MG/DL (CALC)
PLATELET # BLD AUTO: 227 THOUSAND/UL (ref 140–400)
PMV BLD REES-ECKER: 10.4 FL (ref 7.5–12.5)
POTASSIUM SERPL-SCNC: 4.5 MMOL/L (ref 3.5–5.3)
PROT SERPL-MCNC: 6.8 G/DL (ref 6.1–8.1)
RBC # BLD AUTO: 4.98 MILLION/UL (ref 3.8–5.1)
SL AMB EGFR AFRICAN AMERICAN: 81 ML/MIN/1.73M2
SL AMB EGFR NON AFRICAN AMERICAN: 70 ML/MIN/1.73M2
SODIUM SERPL-SCNC: 138 MMOL/L (ref 135–146)
T4 SERPL-MCNC: 10.2 MCG/DL (ref 4.8–10.4)
TRIGL SERPL-MCNC: 229 MG/DL
TSH SERPL-ACNC: 2.68 MIU/L
WBC # BLD AUTO: 5.1 THOUSAND/UL (ref 3.8–10.8)

## 2019-12-16 ENCOUNTER — TELEPHONE (OUTPATIENT)
Dept: FAMILY MEDICINE CLINIC | Facility: CLINIC | Age: 49
End: 2019-12-16

## 2019-12-16 NOTE — TELEPHONE ENCOUNTER
Really should have it looked at, she may need antibiotics  Have to ascertain if the dog is up-to-date with shots, has to be reported to the health bureau

## 2019-12-17 ENCOUNTER — OFFICE VISIT (OUTPATIENT)
Dept: FAMILY MEDICINE CLINIC | Facility: CLINIC | Age: 49
End: 2019-12-17
Payer: COMMERCIAL

## 2019-12-17 ENCOUNTER — TELEPHONE (OUTPATIENT)
Dept: FAMILY MEDICINE CLINIC | Facility: CLINIC | Age: 49
End: 2019-12-17

## 2019-12-17 VITALS
SYSTOLIC BLOOD PRESSURE: 122 MMHG | DIASTOLIC BLOOD PRESSURE: 80 MMHG | WEIGHT: 269.2 LBS | HEIGHT: 66 IN | BODY MASS INDEX: 43.26 KG/M2
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
## 2019-12-17 PROCEDURE — 99213 OFFICE O/P EST LOW 20 MIN: CPT | Performed by: FAMILY MEDICINE

## 2019-12-17 PROCEDURE — 1036F TOBACCO NON-USER: CPT | Performed by: FAMILY MEDICINE

## 2019-12-17 PROCEDURE — 3008F BODY MASS INDEX DOCD: CPT | Performed by: FAMILY MEDICINE

## 2019-12-17 RX ORDER — AMOXICILLIN AND CLAVULANATE POTASSIUM 875; 125 MG/1; MG/1
1 TABLET, FILM COATED ORAL EVERY 12 HOURS SCHEDULED
Qty: 14 TABLET | Refills: 0 | Status: SHIPPED | OUTPATIENT
Start: 2019-12-17 | End: 2019-12-24

## 2019-12-17 NOTE — PROGRESS NOTES
Assessment/Plan:         Diagnoses and all orders for this visit:    Dog bite, initial encounter  Comments:    Staff will contact Health bureau  Orders:  -     amoxicillin-clavulanate (AUGMENTIN) 875-125 mg per tablet; Take 1 tablet by mouth every 12 (twelve) hours for 7 days  -     tetanus-diphtheria-acellular pertussis (ADACEL) 5-2-15 5 LF-mcg/0 5 injection; Inject 0 5 mL into a muscle once for 1 dose          Subjective:   Chief Complaint   Patient presents with    Animal Bite        Patient ID: Ruthann Chin is a 51 y o  female  She was bitten by her dog  It is a rescue dog  Happen yesterday, the dog bit her in the left forearm breaking the skin, left thigh break in the skin, and right breast   Reportedly, it is up-to-date on his shots  She returned to the rescue people  The following portions of the patient's history were reviewed and updated as appropriate: allergies, current medications, past family history, past medical history, past social history, past surgical history and problem list     Review of Systems   Constitutional: Negative for activity change and fatigue  HENT: Negative  Eyes: Positive for discharge  Negative for visual disturbance  Respiratory: Negative  Cardiovascular: Negative  Gastrointestinal: Negative  Endocrine: Negative  Genitourinary: Negative  Musculoskeletal: Negative for myalgias  Skin: Positive for wound  Allergic/Immunologic: Negative  Hematological: Negative for adenopathy  Does not bruise/bleed easily  Psychiatric/Behavioral: Negative  Objective:      /80   Ht 5' 6" (1 676 m)   Wt 122 kg (269 lb 3 2 oz)   BMI 43 45 kg/m²          Physical Exam   Constitutional: She is oriented to person, place, and time  Obese   Eyes: Pupils are equal, round, and reactive to light  EOM are normal    Neck: Normal range of motion  Cardiovascular: Normal rate and regular rhythm     Pulmonary/Chest: Breath sounds normal  Abdominal: Soft  Bowel sounds are normal    Neurological: She is alert and oriented to person, place, and time     Skin:    Bruising with puncture wounds of the ventral left forearm, bruising with puncture wounds of the left medial thigh, bruising with puncture wounds of the right breast

## 2020-01-29 ENCOUNTER — TELEPHONE (OUTPATIENT)
Dept: FAMILY MEDICINE CLINIC | Facility: CLINIC | Age: 50
End: 2020-01-29

## 2020-01-29 DIAGNOSIS — Z20.828 EXPOSURE TO INFLUENZA: Primary | ICD-10-CM

## 2020-01-29 RX ORDER — OSELTAMIVIR PHOSPHATE 75 MG/1
75 CAPSULE ORAL EVERY 12 HOURS SCHEDULED
Qty: 10 CAPSULE | Refills: 0 | Status: SHIPPED | OUTPATIENT
Start: 2020-01-29 | End: 2020-02-03

## 2020-02-17 ENCOUNTER — OFFICE VISIT (OUTPATIENT)
Dept: FAMILY MEDICINE CLINIC | Facility: CLINIC | Age: 50
End: 2020-02-17
Payer: COMMERCIAL

## 2020-02-17 VITALS
WEIGHT: 272 LBS | SYSTOLIC BLOOD PRESSURE: 122 MMHG | BODY MASS INDEX: 43.71 KG/M2 | HEIGHT: 66 IN | DIASTOLIC BLOOD PRESSURE: 78 MMHG | TEMPERATURE: 98.4 F

## 2020-02-17 DIAGNOSIS — H65.03 NON-RECURRENT ACUTE SEROUS OTITIS MEDIA OF BOTH EARS: Primary | ICD-10-CM

## 2020-02-17 PROCEDURE — 3008F BODY MASS INDEX DOCD: CPT | Performed by: FAMILY MEDICINE

## 2020-02-17 PROCEDURE — 3078F DIAST BP <80 MM HG: CPT | Performed by: FAMILY MEDICINE

## 2020-02-17 PROCEDURE — 1036F TOBACCO NON-USER: CPT | Performed by: FAMILY MEDICINE

## 2020-02-17 PROCEDURE — 99213 OFFICE O/P EST LOW 20 MIN: CPT | Performed by: FAMILY MEDICINE

## 2020-02-17 PROCEDURE — 3074F SYST BP LT 130 MM HG: CPT | Performed by: FAMILY MEDICINE

## 2020-02-17 RX ORDER — PREDNISONE 10 MG/1
TABLET ORAL
Qty: 15 TABLET | Refills: 0 | Status: SHIPPED | OUTPATIENT
Start: 2020-02-17 | End: 2020-02-25 | Stop reason: ALTCHOICE

## 2020-02-17 NOTE — PROGRESS NOTES
Assessment/Plan:         Diagnoses and all orders for this visit:    Non-recurrent acute serous otitis media of both ears  -     predniSONE 10 mg tablet; 5 x 1 day, 4 x1 day, 3 x 1 day,2 x1 day,  1 x 1 day          Subjective:   Chief Complaint   Patient presents with    Ear Fullness     b/l x  wks  Patient ID: Neli Caraballo is a 51 y o  female  She is complaining of ear pressure, decreased hearing for about 4 weeks  Think she may have pushed too much wax back in by her ears  She uses Q-tips quite a bit  She did have some cold symptoms at that time  Her ears feel like they are popping all the time  The following portions of the patient's history were reviewed and updated as appropriate: allergies, current medications, past family history, past medical history, past social history, past surgical history and problem list     Review of Systems   Constitutional: Negative for activity change, chills, diaphoresis, fatigue, fever and unexpected weight change  HENT: Positive for ear pain and hearing loss  Negative for congestion, ear discharge, nosebleeds, postnasal drip, rhinorrhea, sinus pressure, sore throat and tinnitus  Eyes: Negative for photophobia, pain, discharge, redness and visual disturbance  Respiratory: Negative for cough, chest tightness, shortness of breath and wheezing  Cardiovascular: Negative for chest pain, palpitations and leg swelling  Denies ARRIOLA   Gastrointestinal: Negative for abdominal pain, blood in stool, constipation, diarrhea, nausea and vomiting  Endocrine: Negative  Genitourinary: Negative for difficulty urinating, dysuria, frequency, hematuria and urgency  Musculoskeletal: Negative for arthralgias, joint swelling and myalgias  Skin: Negative for rash and wound  Denies skin lesions, change in birthmarks   Allergic/Immunologic: Negative for environmental allergies, food allergies and immunocompromised state     Neurological: Negative for dizziness, tremors, seizures, syncope, weakness, numbness and headaches  Hematological: Negative  Psychiatric/Behavioral: Negative for behavioral problems, confusion, decreased concentration and sleep disturbance  The patient is not nervous/anxious  Objective:      /78   Temp 98 4 °F (36 9 °C)   Ht 5' 6" (1 676 m)   Wt 123 kg (272 lb)   BMI 43 90 kg/m²          Physical Exam   Constitutional: She is oriented to person, place, and time  She appears well-developed and well-nourished  No distress  Obese   HENT:   Head: Normocephalic and atraumatic  Right Ear: External ear normal  Tympanic membrane is not injected, not erythematous, not retracted and not bulging  A middle ear effusion is present  Left Ear: External ear normal  Tympanic membrane is not injected, not erythematous, not retracted and not bulging  A middle ear effusion is present  Nose: Nose normal    Mouth/Throat: Oropharynx is clear and moist    Eyes: Pupils are equal, round, and reactive to light  Conjunctivae and EOM are normal    Neck: Normal range of motion  Neck supple  No JVD present  No tracheal deviation present  No thyromegaly present  Cardiovascular: Normal rate, regular rhythm and normal heart sounds  No murmur heard  Pulmonary/Chest: Effort normal and breath sounds normal  She has no wheezes  She has no rales  Abdominal: Soft  Bowel sounds are normal  She exhibits no mass  There is no tenderness  There is no rebound and no guarding  Musculoskeletal: Normal range of motion  She exhibits no edema or tenderness  Lymphadenopathy:     She has no cervical adenopathy  Neurological: She is alert and oriented to person, place, and time  She has normal reflexes  No cranial nerve deficit  Skin: Skin is warm and dry  No lesion and no rash noted  Psychiatric: She has a normal mood and affect  Judgment normal    Nursing note and vitals reviewed

## 2020-02-25 ENCOUNTER — OFFICE VISIT (OUTPATIENT)
Dept: FAMILY MEDICINE CLINIC | Facility: CLINIC | Age: 50
End: 2020-02-25
Payer: COMMERCIAL

## 2020-02-25 VITALS
BODY MASS INDEX: 43.13 KG/M2 | HEIGHT: 66 IN | DIASTOLIC BLOOD PRESSURE: 82 MMHG | WEIGHT: 268.4 LBS | SYSTOLIC BLOOD PRESSURE: 124 MMHG

## 2020-02-25 DIAGNOSIS — M15.9 GENERALIZED OSTEOARTHRITIS OF MULTIPLE SITES: ICD-10-CM

## 2020-02-25 DIAGNOSIS — E03.9 ACQUIRED HYPOTHYROIDISM: ICD-10-CM

## 2020-02-25 DIAGNOSIS — Z01.818 PREOPERATIVE EXAMINATION: Primary | ICD-10-CM

## 2020-02-25 DIAGNOSIS — E66.01 MORBID OBESITY WITH BMI OF 40.0-44.9, ADULT (HCC): ICD-10-CM

## 2020-02-25 DIAGNOSIS — E78.1 HYPERTRIGLYCERIDEMIA: ICD-10-CM

## 2020-02-25 DIAGNOSIS — I10 ESSENTIAL HYPERTENSION: ICD-10-CM

## 2020-02-25 PROCEDURE — 99242 OFF/OP CONSLTJ NEW/EST SF 20: CPT | Performed by: FAMILY MEDICINE

## 2020-02-25 NOTE — PROGRESS NOTES
Assessment/Plan:         Diagnoses and all orders for this visit:    Preoperative examination  Comments:   patient have preoperative testing done in the near future, will await the EKG and lab work before give final clearance  Morbid obesity with BMI of 40 0-44 9, adult ([facility] [address] )    Acquired hypothyroidism  Comments:    Continue levothyroxine 125 mcg daily  Essential hypertension  Comments:    Continue metoprolol 25 mg daily  Generalized osteoarthritis of multiple sites  Comments: Will need to discontinue meloxicam after surgery  Patient is aware  Hypertriglyceridemia  Comments:    Stable on last lab  Will continue to monitor  Subjective:   Chief Complaint   Patient presents with    Pre-op Exam     lap sleeve 03/19/2020 Dr Jones           Patient ID: Joanna Booker is a 51 y o  female  She presents to the office today for a preoperative examination  She is going to have a gastric sleeve placed by Dr Chau Joseph in March  She has battled with her weight most of her adult life, has tried multiple diet and exercise programs without significant weight loss  She has had multiple surgical procedures in the past and has not had an adverse reaction to anesthesia or sedation  She denies any recent cardiovascular symptoms, respiratory symptoms, GI or urinary tract symptoms  The following portions of the patient's history were reviewed and updated as appropriate: allergies, current medications, past family history, past medical history, past social history, past surgical history and problem list     Review of Systems   Constitutional: Negative for activity change, chills, diaphoresis, fatigue, fever and unexpected weight change  HENT: Negative for congestion, ear pain, hearing loss, nosebleeds, postnasal drip, rhinorrhea, sinus pressure, sore throat and tinnitus             Continues to have mild pressure in her ears   Eyes: Negative for photophobia, pain, discharge, redness and visual disturbance  Respiratory: Negative for cough, chest tightness, shortness of breath and wheezing  Cardiovascular: Negative for chest pain, palpitations and leg swelling  Denies ARRIOLA   Gastrointestinal: Negative for abdominal pain, blood in stool, constipation, diarrhea, nausea and vomiting  Endocrine: Negative  Genitourinary: Negative for difficulty urinating, dysuria, frequency, hematuria and urgency  Status post partial hysterectomy   Musculoskeletal: Negative for arthralgias, joint swelling and myalgias  Skin: Negative for rash and wound  Denies skin lesions, change in birthmarks   Allergic/Immunologic: Negative for environmental allergies, food allergies and immunocompromised state  Neurological: Negative for dizziness, tremors, seizures, syncope, weakness, numbness and headaches  Hematological: Negative  Psychiatric/Behavioral: Negative for behavioral problems, confusion, decreased concentration and sleep disturbance  The patient is not nervous/anxious  Objective:      /82   Ht 5' 6" (1 676 m)   Wt 122 kg (268 lb 6 4 oz)   BMI 43 32 kg/m²          Physical Exam   Constitutional: She is oriented to person, place, and time  She appears well-developed and well-nourished  No distress  Morbidly obese   HENT:   Head: Normocephalic and atraumatic  Right Ear: External ear normal  A middle ear effusion is present  Left Ear: External ear normal   No middle ear effusion  Nose: Nose normal    Mouth/Throat: Oropharynx is clear and moist    Eyes: Pupils are equal, round, and reactive to light  Conjunctivae and EOM are normal    Neck: Normal range of motion  Neck supple  No JVD present  No tracheal deviation present  No thyromegaly present  Cardiovascular: Normal rate, regular rhythm and normal heart sounds  No murmur heard  Pulmonary/Chest: Effort normal and breath sounds normal  She has no wheezes  She has no rales  Abdominal: Soft   Bowel sounds are normal  She exhibits no mass  There is no tenderness  There is no rebound and no guarding  Musculoskeletal: Normal range of motion  She exhibits no edema or tenderness  Lymphadenopathy:     She has no cervical adenopathy  Neurological: She is alert and oriented to person, place, and time  She has normal reflexes  No cranial nerve deficit  Skin: Skin is warm and dry  No lesion and no rash noted  Psychiatric: She has a normal mood and affect  Judgment normal    Nursing note and vitals reviewed

## 2020-03-13 DIAGNOSIS — E03.9 ACQUIRED HYPOTHYROIDISM: ICD-10-CM

## 2020-03-13 DIAGNOSIS — I10 ESSENTIAL HYPERTENSION: ICD-10-CM

## 2020-03-13 DIAGNOSIS — F41.8 ANXIETY WITH DEPRESSION: ICD-10-CM

## 2020-03-14 RX ORDER — LEVOTHYROXINE SODIUM 0.12 MG/1
TABLET ORAL
Qty: 90 TABLET | Refills: 1 | Status: SHIPPED | OUTPATIENT
Start: 2020-03-14 | End: 2020-11-12

## 2020-03-14 RX ORDER — CITALOPRAM 20 MG/1
TABLET ORAL
Qty: 90 TABLET | Refills: 1 | Status: SHIPPED | OUTPATIENT
Start: 2020-03-14 | End: 2020-10-30

## 2020-05-18 ENCOUNTER — OFFICE VISIT (OUTPATIENT)
Dept: FAMILY MEDICINE CLINIC | Facility: CLINIC | Age: 50
End: 2020-05-18
Payer: COMMERCIAL

## 2020-05-18 VITALS
SYSTOLIC BLOOD PRESSURE: 110 MMHG | DIASTOLIC BLOOD PRESSURE: 72 MMHG | TEMPERATURE: 98 F | WEIGHT: 252 LBS | HEIGHT: 66 IN | BODY MASS INDEX: 40.5 KG/M2

## 2020-05-18 DIAGNOSIS — E03.9 ACQUIRED HYPOTHYROIDISM: ICD-10-CM

## 2020-05-18 DIAGNOSIS — Z98.84 S/P LAPAROSCOPIC SLEEVE GASTRECTOMY: Primary | ICD-10-CM

## 2020-05-18 DIAGNOSIS — F41.1 GENERALIZED ANXIETY DISORDER: ICD-10-CM

## 2020-05-18 DIAGNOSIS — I10 ESSENTIAL HYPERTENSION: ICD-10-CM

## 2020-05-18 DIAGNOSIS — M15.9 GENERALIZED OSTEOARTHRITIS OF MULTIPLE SITES: ICD-10-CM

## 2020-05-18 DIAGNOSIS — E78.1 HYPERTRIGLYCERIDEMIA: ICD-10-CM

## 2020-05-18 DIAGNOSIS — E66.01 MORBID OBESITY WITH BMI OF 40.0-44.9, ADULT (HCC): ICD-10-CM

## 2020-05-18 PROCEDURE — 99495 TRANSJ CARE MGMT MOD F2F 14D: CPT | Performed by: FAMILY MEDICINE

## 2020-05-18 PROCEDURE — 3008F BODY MASS INDEX DOCD: CPT | Performed by: FAMILY MEDICINE

## 2020-05-18 RX ORDER — ONDANSETRON 4 MG/1
4 TABLET, ORALLY DISINTEGRATING ORAL EVERY 8 HOURS PRN
COMMUNITY
Start: 2020-05-08 | End: 2021-03-24

## 2020-05-18 RX ORDER — PANTOPRAZOLE SODIUM 40 MG/1
40 TABLET, DELAYED RELEASE ORAL
COMMUNITY
Start: 2020-05-08 | End: 2021-03-24

## 2020-07-13 DIAGNOSIS — E89.40 SURGICAL MENOPAUSE ON HORMONE REPLACEMENT THERAPY: ICD-10-CM

## 2020-07-13 DIAGNOSIS — Z79.890 SURGICAL MENOPAUSE ON HORMONE REPLACEMENT THERAPY: ICD-10-CM

## 2020-07-13 RX ORDER — ESTRADIOL 0.5 MG/1
TABLET ORAL
Qty: 30 TABLET | Refills: 1 | Status: SHIPPED | OUTPATIENT
Start: 2020-07-13 | End: 2020-11-23

## 2020-07-31 ENCOUNTER — TELEPHONE (OUTPATIENT)
Dept: FAMILY MEDICINE CLINIC | Facility: CLINIC | Age: 50
End: 2020-07-31

## 2020-07-31 DIAGNOSIS — B37.3 VAGINAL CANDIDIASIS: Primary | ICD-10-CM

## 2020-07-31 RX ORDER — FLUCONAZOLE 150 MG/1
150 TABLET ORAL ONCE
Qty: 1 TABLET | Refills: 0 | Status: SHIPPED | OUTPATIENT
Start: 2020-07-31 | End: 2020-07-31

## 2020-10-30 DIAGNOSIS — F41.8 ANXIETY WITH DEPRESSION: ICD-10-CM

## 2020-10-30 DIAGNOSIS — E89.40 SURGICAL MENOPAUSE ON HORMONE REPLACEMENT THERAPY: ICD-10-CM

## 2020-10-30 DIAGNOSIS — Z79.890 SURGICAL MENOPAUSE ON HORMONE REPLACEMENT THERAPY: ICD-10-CM

## 2020-10-30 RX ORDER — CITALOPRAM 20 MG/1
TABLET ORAL
Qty: 90 TABLET | Refills: 0 | Status: SHIPPED | OUTPATIENT
Start: 2020-10-30 | End: 2021-06-07

## 2020-10-30 RX ORDER — ESTRADIOL 0.5 MG/1
TABLET ORAL
Qty: 30 TABLET | Refills: 0 | OUTPATIENT
Start: 2020-10-30

## 2020-11-12 DIAGNOSIS — E03.9 ACQUIRED HYPOTHYROIDISM: ICD-10-CM

## 2020-11-12 RX ORDER — LEVOTHYROXINE SODIUM 125 UG/1
TABLET ORAL
Qty: 90 TABLET | Refills: 0 | Status: SHIPPED | OUTPATIENT
Start: 2020-11-12 | End: 2021-03-29

## 2020-11-23 ENCOUNTER — OFFICE VISIT (OUTPATIENT)
Dept: FAMILY MEDICINE CLINIC | Facility: CLINIC | Age: 50
End: 2020-11-23
Payer: COMMERCIAL

## 2020-11-23 VITALS
SYSTOLIC BLOOD PRESSURE: 110 MMHG | BODY MASS INDEX: 30.76 KG/M2 | TEMPERATURE: 97.4 F | WEIGHT: 191.4 LBS | DIASTOLIC BLOOD PRESSURE: 62 MMHG | HEIGHT: 66 IN

## 2020-11-23 DIAGNOSIS — E78.1 HYPERTRIGLYCERIDEMIA: ICD-10-CM

## 2020-11-23 DIAGNOSIS — Z12.31 ENCOUNTER FOR SCREENING MAMMOGRAM FOR MALIGNANT NEOPLASM OF BREAST: ICD-10-CM

## 2020-11-23 DIAGNOSIS — E89.40 SURGICAL MENOPAUSE ON HORMONE REPLACEMENT THERAPY: ICD-10-CM

## 2020-11-23 DIAGNOSIS — E03.9 ACQUIRED HYPOTHYROIDISM: ICD-10-CM

## 2020-11-23 DIAGNOSIS — R73.9 HYPERGLYCEMIA: ICD-10-CM

## 2020-11-23 DIAGNOSIS — Z23 NEED FOR VACCINATION: ICD-10-CM

## 2020-11-23 DIAGNOSIS — Z98.84 S/P LAPAROSCOPIC SLEEVE GASTRECTOMY: ICD-10-CM

## 2020-11-23 DIAGNOSIS — I10 ESSENTIAL HYPERTENSION: ICD-10-CM

## 2020-11-23 DIAGNOSIS — Z79.890 SURGICAL MENOPAUSE ON HORMONE REPLACEMENT THERAPY: ICD-10-CM

## 2020-11-23 PROCEDURE — 1036F TOBACCO NON-USER: CPT | Performed by: FAMILY MEDICINE

## 2020-11-23 PROCEDURE — 90471 IMMUNIZATION ADMIN: CPT | Performed by: FAMILY MEDICINE

## 2020-11-23 PROCEDURE — 90688 IIV4 VACCINE SPLT 0.5 ML IM: CPT | Performed by: FAMILY MEDICINE

## 2020-11-23 PROCEDURE — 3078F DIAST BP <80 MM HG: CPT | Performed by: FAMILY MEDICINE

## 2020-11-23 PROCEDURE — 3008F BODY MASS INDEX DOCD: CPT | Performed by: FAMILY MEDICINE

## 2020-11-23 PROCEDURE — 99214 OFFICE O/P EST MOD 30 MIN: CPT | Performed by: FAMILY MEDICINE

## 2020-11-23 PROCEDURE — 3074F SYST BP LT 130 MM HG: CPT | Performed by: FAMILY MEDICINE

## 2020-11-23 RX ORDER — ESTRADIOL 0.5 MG/1
TABLET ORAL
Qty: 25 TABLET | Refills: 0 | Status: SHIPPED | OUTPATIENT
Start: 2020-11-23 | End: 2021-03-24

## 2020-11-24 LAB
25(OH)D3 SERPL-MCNC: 41 NG/ML (ref 30–100)
ALBUMIN SERPL-MCNC: 4.2 G/DL (ref 3.6–5.1)
ALBUMIN/GLOB SERPL: 1.9 (CALC) (ref 1–2.5)
ALP SERPL-CCNC: 79 U/L (ref 31–125)
ALT SERPL-CCNC: 18 U/L (ref 6–29)
AST SERPL-CCNC: 23 U/L (ref 10–35)
BASOPHILS # BLD AUTO: 31 CELLS/UL (ref 0–200)
BASOPHILS NFR BLD AUTO: 0.6 %
BILIRUB SERPL-MCNC: 0.6 MG/DL (ref 0.2–1.2)
BUN SERPL-MCNC: 17 MG/DL (ref 7–25)
BUN/CREAT SERPL: NORMAL (CALC) (ref 6–22)
CALCIUM SERPL-MCNC: 9.2 MG/DL (ref 8.6–10.2)
CHLORIDE SERPL-SCNC: 102 MMOL/L (ref 98–110)
CHOLEST SERPL-MCNC: 220 MG/DL
CHOLEST/HDLC SERPL: 3.5 (CALC)
CO2 SERPL-SCNC: 27 MMOL/L (ref 20–32)
CREAT SERPL-MCNC: 0.78 MG/DL (ref 0.5–1.1)
EOSINOPHIL # BLD AUTO: 128 CELLS/UL (ref 15–500)
EOSINOPHIL NFR BLD AUTO: 2.5 %
ERYTHROCYTE [DISTWIDTH] IN BLOOD BY AUTOMATED COUNT: 13.5 % (ref 11–15)
EST. AVERAGE GLUCOSE BLD GHB EST-MCNC: 97 (CALC)
EST. AVERAGE GLUCOSE BLD GHB EST-SCNC: 5.4 (CALC)
GLOBULIN SER CALC-MCNC: 2.2 G/DL (CALC) (ref 1.9–3.7)
GLUCOSE SERPL-MCNC: 84 MG/DL (ref 65–99)
HBA1C MFR BLD: 5 % OF TOTAL HGB
HCT VFR BLD AUTO: 42.3 % (ref 35–45)
HDLC SERPL-MCNC: 62 MG/DL
HGB BLD-MCNC: 13.7 G/DL (ref 11.7–15.5)
LDLC SERPL CALC-MCNC: 136 MG/DL (CALC)
LYMPHOCYTES # BLD AUTO: 1785 CELLS/UL (ref 850–3900)
LYMPHOCYTES NFR BLD AUTO: 35 %
MCH RBC QN AUTO: 27.3 PG (ref 27–33)
MCHC RBC AUTO-ENTMCNC: 32.4 G/DL (ref 32–36)
MCV RBC AUTO: 84.3 FL (ref 80–100)
MONOCYTES # BLD AUTO: 270 CELLS/UL (ref 200–950)
MONOCYTES NFR BLD AUTO: 5.3 %
NEUTROPHILS # BLD AUTO: 2887 CELLS/UL (ref 1500–7800)
NEUTROPHILS NFR BLD AUTO: 56.6 %
NONHDLC SERPL-MCNC: 158 MG/DL (CALC)
PLATELET # BLD AUTO: 231 THOUSAND/UL (ref 140–400)
PMV BLD REES-ECKER: 11.1 FL (ref 7.5–12.5)
POTASSIUM SERPL-SCNC: 4.2 MMOL/L (ref 3.5–5.3)
PROT SERPL-MCNC: 6.4 G/DL (ref 6.1–8.1)
RBC # BLD AUTO: 5.02 MILLION/UL (ref 3.8–5.1)
SL AMB EGFR AFRICAN AMERICAN: 103 ML/MIN/1.73M2
SL AMB EGFR NON AFRICAN AMERICAN: 89 ML/MIN/1.73M2
SODIUM SERPL-SCNC: 139 MMOL/L (ref 135–146)
TRIGL SERPL-MCNC: 110 MG/DL
TSH SERPL-ACNC: 3.89 MIU/L
WBC # BLD AUTO: 5.1 THOUSAND/UL (ref 3.8–10.8)

## 2020-12-08 LAB — EXT SARS-COV-2: DETECTED

## 2020-12-28 ENCOUNTER — TELEPHONE (OUTPATIENT)
Dept: FAMILY MEDICINE CLINIC | Facility: CLINIC | Age: 50
End: 2020-12-28

## 2020-12-29 ENCOUNTER — TELEPHONE (OUTPATIENT)
Dept: FAMILY MEDICINE CLINIC | Facility: CLINIC | Age: 50
End: 2020-12-29

## 2021-02-23 ENCOUNTER — ANNUAL EXAM (OUTPATIENT)
Dept: OBGYN CLINIC | Facility: CLINIC | Age: 51
End: 2021-02-23

## 2021-02-23 ENCOUNTER — TELEPHONE (OUTPATIENT)
Dept: FAMILY MEDICINE CLINIC | Facility: CLINIC | Age: 51
End: 2021-02-23

## 2021-02-23 VITALS
HEIGHT: 66 IN | SYSTOLIC BLOOD PRESSURE: 109 MMHG | WEIGHT: 176 LBS | HEART RATE: 87 BPM | DIASTOLIC BLOOD PRESSURE: 77 MMHG | BODY MASS INDEX: 28.28 KG/M2

## 2021-02-23 DIAGNOSIS — Z01.419 ENCOUNTER FOR ANNUAL ROUTINE GYNECOLOGICAL EXAMINATION: Primary | ICD-10-CM

## 2021-02-23 DIAGNOSIS — E01.0 THYROMEGALY: Primary | ICD-10-CM

## 2021-02-23 PROCEDURE — G0145 SCR C/V CYTO,THINLAYER,RESCR: HCPCS | Performed by: NURSE PRACTITIONER

## 2021-02-23 PROCEDURE — 87624 HPV HI-RISK TYP POOLED RSLT: CPT | Performed by: NURSE PRACTITIONER

## 2021-02-23 PROCEDURE — 99396 PREV VISIT EST AGE 40-64: CPT | Performed by: NURSE PRACTITIONER

## 2021-02-23 NOTE — TELEPHONE ENCOUNTER
Set her up for thyroid ultrasound I placed order and then schedule her for followup with Dr Gabriel a few days after the test

## 2021-02-23 NOTE — PROGRESS NOTES
Assessment/Plan     Diagnoses and all orders for this visit:    Encounter for annual routine gynecological examination  -     Cancel: Liquid-based pap, screening  -     Liquid-based pap, screening         Plan  Patient Instructions   PAP results can take up to 2 weeks  Keep mammogram appointment  Call with needs or concerns  Return in 1 year     Return in about 1 year (around 2022)  Pt verbalized understanding of all discussed  Subjective      Zack Nicholas is a 53 y o  female who presents for annual exam  The patient has no complaints today  The patient is not sexually active  Last intercourse 2 years ago  GYN screening history: last pap: was normal and HPV negative   The patient is not taking hormone replacement therapy  Patient denies post-menopausal vaginal bleeding  Hx of MIGUEL BSO  and  for cervical dysplasia and positive HPV other and endometriosis  Hx of The patient participates in regular exercise: yes  Discussed calcium and vitamin D  The patient reports that there is not domestic violence in her life  The patient is not having menopausal symptoms none    Discussed Hx of HPV positive , negative PAP will need PAP at normal schedule until age 73   If PAP today requires a colpo it is recommended per Dr Amaya Antis she see GYN/Onc, this was all explained to patient along with rational     Menstrual History:  OB History        3    Para   2    Term   2            AB   1    Living   2       SAB        TAB   1    Ectopic        Multiple        Live Births               Obstetric Comments   MENOPAUSE            Menarche age: 13  No LMP recorded  Patient has had a hysterectomy  age 29       The following portions of the patient's history were reviewed and updated as appropriate: allergies, current medications, past family history, past medical history, past social history, past surgical history and problem list     Review of Systems  Pertinent items are noted in HPI  Objective      /77   Pulse 87   Ht 5' 6" (1 676 m)   Wt 79 8 kg (176 lb)   BMI 28 41 kg/m²      General:   alert and oriented, in no acute distress, alert, appears stated age and cooperative   Heart: regular rate and rhythm, S1, S2 normal, no murmur, click, rub or gallop   Lungs: clear to auscultation bilaterally, WNL respiratory effort, negative cough or SOB   Thyroid: Negative masses   Abdomen: soft, non-tender, without masses or organomegaly   Vulva: normal   Vagina: normal mucosa   Cervix: surgically absent   Uterus: surgically absent   Adnexa: surgically absent   Breasts: NT, negative masses, discharge or dimpling         Lab Review  PAP with HPV co-testing ordered

## 2021-02-23 NOTE — LETTER
2021    [address] [place] [address] [address]  [address] [facility] [id number]        3/2/2021    To Neli Caraballo  : 1970      This letter is to advise you that your recent PAP SMEAR results were reviewed by me and are NORMAL    We will see you in 1 year for your annual exam     Fred Caba

## 2021-02-23 NOTE — PATIENT INSTRUCTIONS
PAP results can take up to 2 weeks  Keep mammogram appointment  Call with needs or concerns  Return in 1 year     COVID-19 Instructions    If you are having any of the following:  Cough   Shortness of breath   Fever  If traveled within past 2 weeks internationally or to high risk [place] states  Or been in contact with someone that has     Please call either:   Your PCP office  [phone], option [phone]    They will screen you over the phone and direct you to the nearest appropriate testing location    DO NOT go to your PCP or OB office without calling first

## 2021-02-26 LAB
HPV HR 12 DNA CVX QL NAA+PROBE: NEGATIVE
HPV16 DNA CVX QL NAA+PROBE: NEGATIVE
HPV18 DNA CVX QL NAA+PROBE: NEGATIVE
LAB AP GYN PRIMARY INTERPRETATION: NORMAL
Lab: NORMAL

## 2021-03-01 ENCOUNTER — TELEPHONE (OUTPATIENT)
Dept: FAMILY MEDICINE CLINIC | Facility: CLINIC | Age: 51
End: 2021-03-01

## 2021-03-01 ENCOUNTER — HOSPITAL ENCOUNTER (OUTPATIENT)
Dept: ULTRASOUND IMAGING | Facility: HOSPITAL | Age: 51
Discharge: HOME | End: 2021-03-01
Payer: COMMERCIAL

## 2021-03-01 DIAGNOSIS — E01.0 THYROMEGALY: ICD-10-CM

## 2021-03-01 DIAGNOSIS — E04.2 MULTIPLE THYROID NODULES: Primary | ICD-10-CM

## 2021-03-01 PROCEDURE — 76536 US EXAM OF HEAD AND NECK: CPT

## 2021-03-01 NOTE — TELEPHONE ENCOUNTER
----- Message from Achilles Section, DO sent at 3/1/2021  2:29 PM EST -----  Thyroid ultrasound showed 3 nodules, 2 are somewhat large in may need to be biopsied  I would recommend she see Interventional Radiology in get that set up  Let me know if she is willing to do so

## 2021-03-02 ENCOUNTER — TRANSCRIBE ORDERS (OUTPATIENT)
Dept: ADMINISTRATIVE | Facility: HOSPITAL | Age: 51
End: 2021-03-02

## 2021-03-02 DIAGNOSIS — E04.1 NONTOXIC SINGLE THYROID NODULE: Primary | ICD-10-CM

## 2021-03-10 NOTE — NURSING NOTE
Call placed to patient to discuss upcoming thyroid biopsy at [facility]  RN reviewed patient allergies, verified that patient does not currently take any anticoagulant medications and discussed pre and post procedure expectations  Questions answered  Patient reminded of location and time of the expected procedure  Patient verbalizes understanding  Contact number provided in case patient has any further questions

## 2021-03-16 ENCOUNTER — TELEPHONE (OUTPATIENT)
Dept: FAMILY MEDICINE CLINIC | Facility: CLINIC | Age: 51
End: 2021-03-16

## 2021-03-19 ENCOUNTER — HOSPITAL ENCOUNTER (OUTPATIENT)
Dept: ULTRASOUND IMAGING | Facility: HOSPITAL | Age: 51
Discharge: HOME | End: 2021-03-19
Payer: COMMERCIAL

## 2021-03-19 DIAGNOSIS — E04.1 NONTOXIC SINGLE THYROID NODULE: ICD-10-CM

## 2021-03-19 PROCEDURE — 88173 CYTOPATH EVAL FNA REPORT: CPT | Performed by: PATHOLOGY

## 2021-03-19 PROCEDURE — 88172 CYTP DX EVAL FNA 1ST EA SITE: CPT | Performed by: PATHOLOGY

## 2021-03-19 PROCEDURE — 10005 FNA BX W/US GDN 1ST LES: CPT

## 2021-03-19 RX ORDER — LIDOCAINE HYDROCHLORIDE 10 MG/ML
5 INJECTION, SOLUTION EPIDURAL; INFILTRATION; INTRACAUDAL; PERINEURAL ONCE
Status: COMPLETED | OUTPATIENT
Start: 2021-03-19 | End: 2021-03-19

## 2021-03-19 RX ADMIN — LIDOCAINE HYDROCHLORIDE 5 ML: 10 INJECTION, SOLUTION EPIDURAL; INFILTRATION; INTRACAUDAL; PERINEURAL at 08:25

## 2021-03-23 ENCOUNTER — TELEPHONE (OUTPATIENT)
Dept: FAMILY MEDICINE CLINIC | Facility: CLINIC | Age: 51
End: 2021-03-23

## 2021-03-24 DIAGNOSIS — E03.9 ACQUIRED HYPOTHYROIDISM: Primary | ICD-10-CM

## 2021-03-24 DIAGNOSIS — R89.9 ABNORMAL THYROID BIOPSY: ICD-10-CM

## 2021-03-24 DIAGNOSIS — E04.2 MULTIPLE THYROID NODULES: ICD-10-CM

## 2021-03-29 DIAGNOSIS — E03.9 ACQUIRED HYPOTHYROIDISM: ICD-10-CM

## 2021-03-29 RX ORDER — LEVOTHYROXINE SODIUM 0.12 MG/1
TABLET ORAL
Qty: 90 TABLET | Refills: 0 | Status: SHIPPED | OUTPATIENT
Start: 2021-03-29 | End: 2021-06-07 | Stop reason: SDUPTHER

## 2021-04-19 ENCOUNTER — HOSPITAL ENCOUNTER (OUTPATIENT)
Dept: MAMMOGRAPHY | Facility: MEDICAL CENTER | Age: 51
Discharge: HOME | End: 2021-04-19
Payer: COMMERCIAL

## 2021-04-19 VITALS — HEIGHT: 66 IN | BODY MASS INDEX: 27.32 KG/M2 | WEIGHT: 170 LBS

## 2021-04-19 DIAGNOSIS — Z12.31 ENCOUNTER FOR SCREENING MAMMOGRAM FOR MALIGNANT NEOPLASM OF BREAST: ICD-10-CM

## 2021-04-19 PROCEDURE — 77067 SCR MAMMO BI INCL CAD: CPT

## 2021-04-19 PROCEDURE — 77063 BREAST TOMOSYNTHESIS BI: CPT

## 2021-05-13 ENCOUNTER — CONSULT (OUTPATIENT)
Dept: ENDOCRINOLOGY | Facility: CLINIC | Age: 51
End: 2021-05-13
Payer: COMMERCIAL

## 2021-05-13 VITALS
SYSTOLIC BLOOD PRESSURE: 120 MMHG | WEIGHT: 175.6 LBS | DIASTOLIC BLOOD PRESSURE: 60 MMHG | HEIGHT: 66 IN | BODY MASS INDEX: 28.22 KG/M2

## 2021-05-13 DIAGNOSIS — E03.9 ACQUIRED HYPOTHYROIDISM: ICD-10-CM

## 2021-05-13 DIAGNOSIS — R89.9 ABNORMAL THYROID BIOPSY: ICD-10-CM

## 2021-05-13 DIAGNOSIS — E04.2 MULTIPLE THYROID NODULES: ICD-10-CM

## 2021-05-13 PROCEDURE — 99204 OFFICE O/P NEW MOD 45 MIN: CPT | Performed by: INTERNAL MEDICINE

## 2021-05-13 NOTE — PROGRESS NOTES
Neida Lowers 49 y o  female with past medical history of hypothyroidism, sleeve gastrectomy presents for evaluation for thyroid nodule(s)  Nodules were discovered in a MRI which was done in february 2021 for neck pain and showed Asymmetric enlargement of the right lobe of the thyroid gland for which thyroid US which showed thyroid nodules  She had 3 thyroid nodules reported on the right side, 1 2 x 1 4 x 1 0 cm with TRAD 4,   2 9 x 2 2 x 3 1 cm with TRAD 3, 3 3 x 2 7 x 3 4 cm with Trad 3,   She has FNA done for 2 of her thyroid nodules which is reported as atypia of undetermined significance([place] category III), Afirma was not done  Associated signs/symptoms:  Trouble swallowing  No  Hoarseness/change in voice:  {No  Trouble breathing  No     Modifying factors which can cause nodule formation:  H/o Radiation to the head or neck region No  History of thyroid cancer in one or more first degree relativesNo  Prior hemithyroidectomy with discovery of thyroid cancer No  Family history of thyroid disease: No     Fatigue: No  No weight loss/ No weight gain  NoChange in appetite  Yesheat intolerance/ Nocold intolerance  Nodiarrhea/ Noconstipation  Nosweating/Notremor/Nopalpitation  Nodifficulty sleeping    She has long history of hypothyroidism for more than 15 years, and she is taking levothyroxine 125 mcg regularly and properly  She has history of sleeve gastric surgery last year and she lost 100 Ibs ever since,    All other systems were reviewed and are negative      Patient Active Problem List   Diagnosis    Generalized anxiety disorder    Essential hypertension    Generalized osteoarthritis of multiple sites    Acquired hypothyroidism    Stress incontinence, female    Surgical menopause    Lumbar disc disease    Chronic right-sided low back pain without sciatica    Lumbar radiculopathy    Morbid obesity with BMI of 40 0-44 9, adult (HCC)    Hypertriglyceridemia    Marijuana use, episodic Past Medical History:   Diagnosis Date    Abnormal Pap smear of cervix     Cervical dysplasia     LAST ASSESSED: [id number]    HPV (human papilloma virus) infection     Hypertension     Hypothyroid       Past Surgical History:   Procedure Laterality Date    BILATERAL SALPINGOOPHORECTOMY Bilateral 2005    CERVICAL BIOPSY  W/ LOOP ELECTRODE EXCISION      COLPOSCOPY      TOTAL ABDOMINAL HYSTERECTOMY      OCCURRED PRIOR TO 2002, REASON GIVEN WAS RECURRENT CERVICAL DYSPLASIA    TUBAL LIGATION  1993    US GUIDED THYROID BIOPSY  3/19/2021      Family History   Problem Relation Age of Onset    Diabetes Father     Hypertension Father     Diabetes Family     Hypertension Family     Skin cancer Family     COPD Mother     Breast cancer Maternal Grandmother 61    No Known Problems Sister     No Known Problems Daughter     No Known Problems Maternal Grandfather     No Known Problems Paternal Grandmother     No Known Problems Paternal Grandfather     No Known Problems Maternal Aunt     No Known Problems Maternal Aunt     No Known Problems Paternal Aunt      Social History     Tobacco Use    Smoking status: Former Smoker    Smokeless tobacco: Never Used   Substance Use Topics    Alcohol use: Yes     Frequency: Monthly or less     Drinks per session: 1 or 2     Comment: SOCIAL     Allergies   Allergen Reactions    Ace Inhibitors Cough    Compazine [Prochlorperazine] Other (See Comments)     Not specified    Phenothiazines Confusion    Quinolones     Sulfamethoxazole-Trimethoprim         Physical Examination:  Vitals:    05/13/21 0820   BP: 120/60       General appearance - alert, well appearing, and in no distress  Mental status - normal mood, behavior, speech, dress, motor activity, and thought processes  HEENT: Normocephalic/ atraumatic  EOMI  Respiratory : CTAB; no wheeze; no crept  Cardiovascular: regular rate and rhythm,  Abdomen: soft, non distended, non tender  Extremities: No edema   DP 2 + No cyanosis/ no clubbing  Thyroid -  Right sided thyroid enlargement, not tender, no nodule noted, no LAD      Lab Results   Component Value Date    TSH 2 68 12/03/2019    [id number] 10 2 12/03/2019       Pathology:    A B  Thyroid, Right, Mid: (Thin-Prep and smears)  Atypia of undetermined significance ([place] Category III) - See note  Follicular cells in microfollicular patterns with some crowding and overlap  Oncocytes and mixed inflammatory cells  Scant colloid         Imaging:    THYROID ULTRASOUND     INDICATION:    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      COMPARISON:  None     TECHNIQUE:   Ultrasound of the thyroid was performed with a high frequency linear transducer in transverse and sagittal planes including volumetric imaging sweeps as well as traditional still imaging technique      FINDINGS:  Thyroid parenchyma is diffusely heterogeneous in echotexture with focal nodule(s) as described below      Right lobe:  6 9 x 2 7 x 2 5 cm   22 18 mL  Left lobe:  4 6 x 1 6 x 1 5 cm   5 55 mL  Isthmus:  0 5 cm      Nodule #1  Image 9  RIGHT midgland nodule measuring 1 2 x 1 4 x 1 0 cm  No priors for comparison  COMPOSITION:  2 points, solid or almost completely solid   ECHOGENICITY:  1 point, hyperechoic or isoechoic  SHAPE:  3 points, taller-than-wide  MARGIN: 0 points, smooth  ECHOGENIC FOCI:  0 points, none or large comet-tail artifacts  TI-RADS Classification: TR 4 (4-6 points), FNA if > 1 5 cm  Follow if > 1cm      Nodule #2  Image 21  RIGHT midgland nodule measuring 2 9 x 2 2 x 3 1 cm  No priors for comparison  COMPOSITION:  2 points, solid or almost completely solid   ECHOGENICITY:  1 point, hyperechoic or isoechoic  SHAPE:  0 points, wider-than-tall  MARGIN: 0 points, smooth  ECHOGENIC FOCI:  0 points, none or large comet-tail artifacts  TI-RADS Classification: TR 3 (3 points), FNA if >2 5 cm  Follow if >1 5 cm      Nodule #3  Image 30    RIGHT lower pole nodule measuring 3 3 x 2 7 x 3 4 cm  COMPOSITION:  2 points, solid or almost completely solid   ECHOGENICITY:  1 point, hyperechoic or isoechoic  SHAPE:  0 points, wider-than-tall  MARGIN: 0 points, smooth  ECHOGENIC FOCI:  0 points, none or large comet-tail artifacts  TI-RADS Classification: TR 3 (3 points), FNA if >2 5 cm  Follow if >1 5 cm            IMPRESSION:     The following meet current ACR criteria for recommending ultrasound guided biopsy:         The 2 9 x 2 2 x 3 1 cm right mid gland nodule  (Image number 21) (CRITERIA: TR 3, Mildly suspicious  FNA if >2 5 cm       The 3 3 x 2 7 x 3 4 cm right lower gland nodule  (Image number 30) (CRITERIA: TR 3, Mildly suspicious  FNA if >2 5 cm       Right mid gland nodule for which one-year follow-up is recommended      Reference: ACR Thyroid Imaging, Reporting and Data System (TI-RADS): White Paper of the [employer]  J AM Nora Radiol 6069;45:534-352  (additional recommendations based on American Thyroid Association 2015 guidelines )          Previous records including pertinent laboratory and radiographic results were reviewed  Thyroid ultrasound images were independently reviewed  ASSESSMENT/PLAN:              1  Thyroid nodules:    Incidentally were and on MRI of neck, with no compressive symptoms  which confirmed on thyroid ultrasound showing 3 right-sided thyroid nodules, midgland nodule measuring 1 2 x 1 4 x 1 0 cm  With TRAD 4, 2 9 x 2 2 x 3 1 cm with TRAD 3,  And 3 3 x 2 7 x 3 4 cm with Trad 3,  FNA was done for last 2 nodules and resulted as atypia of undetermined significance  Afirma was not done  We discussed in detail with the patient the options to manage the nodule further including repeating FNA with Afirma versus performing surgery to removing the nodules  As Afirma will change surgery to do total thyroidectomy versus lobectomy, we do recommend to repeat FNA with Afirma and patient is agreeable with the plan      We bethaniewed pathophysiology of thyroid nodules with patient  - Reviewed potential results of aspiration and Afirma results  - Emphasized the importance of maintaining close clinical follow-up in order to ensure that nodule(s) do not increase in size and that malignancy has not developed    The patient indicates understanding of these issues and agrees with the plan  Hypothyroidism:  Likely due to Hashimoto's thyroiditis  Currently on levothyroxine 125 mcg once daily  Patient is taking it regularly and properly  Patient is clinically and biochemically euthyroid  Continue current dose levothyroxine  Portions of the record may have been created with voice recognition software  Occasional wrong word or "sound a like" substitutions may have occurred due to the inherent limitations of voice recognition software  Read the chart carefully and recognize, using context, where substitutions have occurred

## 2021-06-07 ENCOUNTER — OFFICE VISIT (OUTPATIENT)
Dept: FAMILY MEDICINE CLINIC | Facility: CLINIC | Age: 51
End: 2021-06-07
Payer: COMMERCIAL

## 2021-06-07 VITALS
HEIGHT: 66 IN | BODY MASS INDEX: 27.51 KG/M2 | SYSTOLIC BLOOD PRESSURE: 102 MMHG | DIASTOLIC BLOOD PRESSURE: 60 MMHG | WEIGHT: 171.2 LBS | TEMPERATURE: 97.9 F

## 2021-06-07 DIAGNOSIS — Z98.84 STATUS POST BARIATRIC SURGERY: ICD-10-CM

## 2021-06-07 DIAGNOSIS — E03.9 ACQUIRED HYPOTHYROIDISM: Primary | ICD-10-CM

## 2021-06-07 DIAGNOSIS — F41.8 ANXIETY WITH DEPRESSION: ICD-10-CM

## 2021-06-07 DIAGNOSIS — F41.1 GENERALIZED ANXIETY DISORDER: ICD-10-CM

## 2021-06-07 DIAGNOSIS — I10 ESSENTIAL HYPERTENSION: ICD-10-CM

## 2021-06-07 DIAGNOSIS — Z12.11 SCREENING FOR COLON CANCER: ICD-10-CM

## 2021-06-07 DIAGNOSIS — E04.2 MULTIPLE THYROID NODULES: ICD-10-CM

## 2021-06-07 DIAGNOSIS — E78.1 HYPERTRIGLYCERIDEMIA: ICD-10-CM

## 2021-06-07 DIAGNOSIS — R73.9 HYPERGLYCEMIA: ICD-10-CM

## 2021-06-07 PROBLEM — E66.01 MORBID OBESITY WITH BMI OF 40.0-44.9, ADULT (HCC): Status: RESOLVED | Noted: 2019-04-29 | Resolved: 2021-06-07

## 2021-06-07 PROCEDURE — 1036F TOBACCO NON-USER: CPT | Performed by: FAMILY MEDICINE

## 2021-06-07 PROCEDURE — 99214 OFFICE O/P EST MOD 30 MIN: CPT | Performed by: FAMILY MEDICINE

## 2021-06-07 PROCEDURE — 3074F SYST BP LT 130 MM HG: CPT | Performed by: FAMILY MEDICINE

## 2021-06-07 PROCEDURE — 3078F DIAST BP <80 MM HG: CPT | Performed by: FAMILY MEDICINE

## 2021-06-07 PROCEDURE — 3008F BODY MASS INDEX DOCD: CPT | Performed by: FAMILY MEDICINE

## 2021-06-07 RX ORDER — LEVOTHYROXINE SODIUM 0.12 MG/1
125 TABLET ORAL DAILY
Qty: 90 TABLET | Refills: 1 | Status: SHIPPED | OUTPATIENT
Start: 2021-06-07 | End: 2021-07-19 | Stop reason: SDUPTHER

## 2021-06-07 NOTE — ASSESSMENT & PLAN NOTE
Will be due for lab soon  Continue levothyroxine 125 mcg daily  Recheck depending on what her levels are

## 2021-06-07 NOTE — ASSESSMENT & PLAN NOTE
This has resolved, patient no longer taking citalopram   She feels well off medication and would prefer not to take anything as long as she is doing well

## 2021-06-07 NOTE — PROGRESS NOTES
Assessment/Plan:    Essential hypertension   Diet controlled  Doing much better since she has lost her weight, continue diet and exercise  Acquired hypothyroidism    Will be due for lab soon  Continue levothyroxine 125 mcg daily  Recheck depending on what her levels are  Status post bariatric surgery    Follow-up with Bariatric Medicine as scheduled  She has lost over 100 lb since her surgery  Generalized anxiety disorder    This has resolved, patient no longer taking citalopram   She feels well off medication and would prefer not to take anything as long as she is doing well  Diagnoses and all orders for this visit:    Acquired hypothyroidism  -     TSH, 3rd generation; Future  -     T4; Future  -     levothyroxine 125 mcg tablet; Take 1 tablet (125 mcg total) by mouth daily    Multiple thyroid nodules    Essential hypertension  -     CBC and differential; Future  -     Comprehensive metabolic panel; Future  -     Lipid panel; Future    Hyperglycemia  -     Hemoglobin A1C; Future    Anxiety with depression    Screening for colon cancer  -     Ambulatory referral to Gastroenterology; Future    Status post bariatric surgery    Hypertriglyceridemia    Generalized anxiety disorder          Subjective:   Chief Complaint   Patient presents with    Hypothyroidism     refill med 90 day           Patient ID: Rehana Madera is a 49 y o  female  She is here for follow-up on her hypothyroidism, thyroid nodules, weight, anxiety, and update me on her current health  She is up-to-date with mammogram, Pap smear, needs to get a colonoscopy as she just turned 49  She has lost over 100 lb since her bariatric procedure  She is feeling so well, she discontinued her citalopram   She has not had any trouble with anxiety since then        The following portions of the patient's history were reviewed and updated as appropriate: allergies, current medications, past family history, past medical history, past social history, past surgical history and problem list     Review of Systems   Constitutional: Negative for activity change, chills, diaphoresis, fatigue, fever and unexpected weight change  HENT: Negative for congestion, ear pain, hearing loss, nosebleeds, postnasal drip, rhinorrhea, sinus pressure, sore throat and tinnitus  Eyes: Negative for photophobia, pain, discharge, redness and visual disturbance  Respiratory: Negative for cough, chest tightness, shortness of breath and wheezing  Cardiovascular: Negative for chest pain, palpitations and leg swelling  Denies ARRIOLA   Gastrointestinal: Negative for abdominal pain, blood in stool, constipation, diarrhea, nausea and vomiting  Endocrine: Negative  Genitourinary: Negative for difficulty urinating, dysuria, frequency, hematuria and urgency  Musculoskeletal: Negative for arthralgias, joint swelling and myalgias  Skin: Negative for rash and wound  Denies skin lesions, change in birthmarks   Allergic/Immunologic: Negative for environmental allergies, food allergies and immunocompromised state  Neurological: Negative for dizziness, tremors, seizures, syncope, weakness, numbness and headaches  Hematological: Negative  Psychiatric/Behavioral: Negative for behavioral problems, confusion, decreased concentration and sleep disturbance  The patient is not nervous/anxious  Objective:      /60   Temp 97 9 °F (36 6 °C)   Ht 5' 6" (1 676 m)   Wt 77 7 kg (171 lb 3 2 oz)   BMI 27 63 kg/m²          Physical Exam  Vitals signs and nursing note reviewed  Constitutional:       General: She is not in acute distress  Appearance: She is well-developed  HENT:      Head: Normocephalic and atraumatic        Right Ear: Tympanic membrane, ear canal and external ear normal       Left Ear: Tympanic membrane, ear canal and external ear normal       Nose: Nose normal    Eyes:      Conjunctiva/sclera: Conjunctivae normal       Pupils: Pupils are equal, round, and reactive to light  Neck:      Musculoskeletal: Normal range of motion and neck supple  Thyroid: No thyromegaly  Vascular: No JVD  Trachea: No tracheal deviation  Cardiovascular:      Rate and Rhythm: Normal rate and regular rhythm  Heart sounds: Normal heart sounds  No murmur  Pulmonary:      Effort: Pulmonary effort is normal       Breath sounds: Normal breath sounds  No wheezing or rales  Abdominal:      General: Bowel sounds are normal       Palpations: Abdomen is soft  There is no mass  Tenderness: There is no abdominal tenderness  There is no guarding or rebound  Musculoskeletal: Normal range of motion  General: No tenderness  Lymphadenopathy:      Cervical: No cervical adenopathy  Skin:     General: Skin is warm and dry  Findings: No lesion or rash  Neurological:      Mental Status: She is alert and oriented to person, place, and time  Cranial Nerves: No cranial nerve deficit  Deep Tendon Reflexes: Reflexes are normal and symmetric     Psychiatric:         Judgment: Judgment normal

## 2021-06-08 NOTE — NURSING NOTE
Call placed to patient to discuss upcoming appointment at [place] [address] radiology department and complete consultation with patient  Patient is having right US guided thyroid biopsy  Reviewed  patient's allergies, no current anticoagulant medication present, Patient has had procedure in the past, no questions when asked if any questions or concerns  Reminded patient of location and time expected for procedure, Patient expressed understanding by verbalizing and repeating instructions

## 2021-06-15 ENCOUNTER — HOSPITAL ENCOUNTER (OUTPATIENT)
Dept: RADIOLOGY | Facility: HOSPITAL | Age: 51
Discharge: HOME | End: 2021-06-15
Admitting: STUDENT IN AN ORGANIZED HEALTH CARE EDUCATION/TRAINING PROGRAM
Payer: COMMERCIAL

## 2021-06-15 DIAGNOSIS — E04.2 MULTIPLE THYROID NODULES: ICD-10-CM

## 2021-06-15 LAB
ALBUMIN SERPL-MCNC: 3.9 G/DL (ref 3.6–5.1)
ALBUMIN/GLOB SERPL: 1.5 (CALC) (ref 1–2.5)
ALP SERPL-CCNC: 76 U/L (ref 37–153)
ALT SERPL-CCNC: 14 U/L (ref 6–29)
AST SERPL-CCNC: 19 U/L (ref 10–35)
BASOPHILS # BLD AUTO: 38 CELLS/UL (ref 0–200)
BASOPHILS NFR BLD AUTO: 0.8 %
BILIRUB SERPL-MCNC: 0.6 MG/DL (ref 0.2–1.2)
BUN SERPL-MCNC: 18 MG/DL (ref 7–25)
BUN/CREAT SERPL: NORMAL (CALC) (ref 6–22)
CALCIUM SERPL-MCNC: 9.5 MG/DL (ref 8.6–10.4)
CHLORIDE SERPL-SCNC: 104 MMOL/L (ref 98–110)
CHOLEST SERPL-MCNC: 190 MG/DL
CHOLEST/HDLC SERPL: 2.7 (CALC)
CO2 SERPL-SCNC: 32 MMOL/L (ref 20–32)
CREAT SERPL-MCNC: 0.78 MG/DL (ref 0.5–1.05)
EOSINOPHIL # BLD AUTO: 130 CELLS/UL (ref 15–500)
EOSINOPHIL NFR BLD AUTO: 2.7 %
ERYTHROCYTE [DISTWIDTH] IN BLOOD BY AUTOMATED COUNT: 13.5 % (ref 11–15)
GLOBULIN SER CALC-MCNC: 2.6 G/DL (CALC) (ref 1.9–3.7)
GLUCOSE SERPL-MCNC: 83 MG/DL (ref 65–99)
HBA1C MFR BLD: 5.2 % OF TOTAL HGB
HCT VFR BLD AUTO: 41.2 % (ref 35–45)
HDLC SERPL-MCNC: 71 MG/DL
HGB BLD-MCNC: 13.5 G/DL (ref 11.7–15.5)
LDLC SERPL CALC-MCNC: 100 MG/DL (CALC)
LYMPHOCYTES # BLD AUTO: 1718 CELLS/UL (ref 850–3900)
LYMPHOCYTES NFR BLD AUTO: 35.8 %
MCH RBC QN AUTO: 27.1 PG (ref 27–33)
MCHC RBC AUTO-ENTMCNC: 32.8 G/DL (ref 32–36)
MCV RBC AUTO: 82.6 FL (ref 80–100)
MONOCYTES # BLD AUTO: 360 CELLS/UL (ref 200–950)
MONOCYTES NFR BLD AUTO: 7.5 %
NEUTROPHILS # BLD AUTO: 2554 CELLS/UL (ref 1500–7800)
NEUTROPHILS NFR BLD AUTO: 53.2 %
NONHDLC SERPL-MCNC: 119 MG/DL (CALC)
PLATELET # BLD AUTO: 261 THOUSAND/UL (ref 140–400)
PMV BLD REES-ECKER: 10 FL (ref 7.5–12.5)
POTASSIUM SERPL-SCNC: 4.6 MMOL/L (ref 3.5–5.3)
PROT SERPL-MCNC: 6.5 G/DL (ref 6.1–8.1)
RBC # BLD AUTO: 4.99 MILLION/UL (ref 3.8–5.1)
SL AMB EGFR AFRICAN AMERICAN: 103 ML/MIN/1.73M2
SL AMB EGFR NON AFRICAN AMERICAN: 89 ML/MIN/1.73M2
SODIUM SERPL-SCNC: 141 MMOL/L (ref 135–146)
T4 SERPL-MCNC: 10.7 MCG/DL (ref 5.1–11.9)
TRIGL SERPL-MCNC: 94 MG/DL
TSH SERPL-ACNC: 1.38 MIU/L
WBC # BLD AUTO: 4.8 THOUSAND/UL (ref 3.8–10.8)

## 2021-06-15 PROCEDURE — 88173 CYTOPATH EVAL FNA REPORT: CPT | Performed by: PATHOLOGY

## 2021-06-15 PROCEDURE — 10005 FNA BX W/US GDN 1ST LES: CPT

## 2021-06-15 RX ORDER — LIDOCAINE HYDROCHLORIDE 10 MG/ML
5 INJECTION, SOLUTION EPIDURAL; INFILTRATION; INTRACAUDAL; PERINEURAL ONCE
Status: COMPLETED | OUTPATIENT
Start: 2021-06-15 | End: 2021-06-15

## 2021-06-15 RX ADMIN — LIDOCAINE HYDROCHLORIDE 3 ML: 10 INJECTION, SOLUTION EPIDURAL; INFILTRATION; INTRACAUDAL; PERINEURAL at 09:40

## 2021-06-17 ENCOUNTER — PREP FOR PROCEDURE (OUTPATIENT)
Dept: GASTROENTEROLOGY | Facility: MEDICAL CENTER | Age: 51
End: 2021-06-17

## 2021-06-17 DIAGNOSIS — Z12.11 SPECIAL SCREENING FOR MALIGNANT NEOPLASMS, COLON: Primary | ICD-10-CM

## 2021-06-17 NOTE — TELEPHONE ENCOUNTER
TC to cl to schedule OA colon  Colon scheduled for Monday, 8/30/21, at [address] with Dr Velasquez Rajan  Prep to be discussed with Dr Velasquez Rajan in consideration of cl's past surg hx of gastric sleeve 5/7/2020

## 2021-06-18 ENCOUNTER — TELEPHONE (OUTPATIENT)
Dept: OBGYN CLINIC | Facility: HOSPITAL | Age: 51
End: 2021-06-18

## 2021-06-18 NOTE — TELEPHONE ENCOUNTER
Called the patient to discuss the FNA results, she was not available, left a detailed message  Results showed:  A -B  Thyroid, right mid (fine needle aspiration):      - Benign ([place] Category II) - See note  - Adenomatoid nodule with cystic change       Satisfactory for evaluation      C  -D  Thyroid, right lower pole (fine needle aspiration):      - Atypia of undetermined significance ([place] Category III) - See note  - Focal nuclear atypia including elongation and irregularity       - Scant colloid  Right mid thyroid nodule was benign and the right lower pole nodule is a atypia of undetermined significance which we need to wait for Afirma test results for further management

## 2021-06-22 RX ORDER — SOD SULF/POT CHLORIDE/MAG SULF 1.479 G
TABLET ORAL
Qty: 24 TABLET | Refills: 0 | Status: SHIPPED | OUTPATIENT
Start: 2021-06-22 | End: 2021-08-10 | Stop reason: SDUPTHER

## 2021-06-22 NOTE — TELEPHONE ENCOUNTER
TC to cl to address her concerns re: volume of fluid needed for prep  Cl inquired re: Sutab  Advised cl that often the prescription prep is not covered by insurance, and may cost more than $100  Cl requested that script be submitted and then she can decide if she wants to pay for it  Will send Sutab and Dulcolax/Miralax prep instructions to cl for procedure

## 2021-06-25 ENCOUNTER — TELEPHONE (OUTPATIENT)
Dept: OBGYN CLINIC | Facility: CLINIC | Age: 51
End: 2021-06-25

## 2021-06-25 NOTE — TELEPHONE ENCOUNTER
Pt call with concerns that after not having intercourse in 6 years she noted VB after intercourse  Pt had a Hx of a hysterectomy at age 29  Discussed postmenopausal changes to vaginal tissue  Discussed more foreplay use of lubrication and more gentle intercourse  Advised pt to call with continued concerns    Pt verbalized understanding of all discussed   '

## 2021-06-30 ENCOUNTER — TELEPHONE (OUTPATIENT)
Dept: NEPHROLOGY | Facility: HOSPITAL | Age: 51
End: 2021-06-30

## 2021-06-30 ENCOUNTER — TELEPHONE (OUTPATIENT)
Dept: ENDOCRINOLOGY | Facility: CLINIC | Age: 51
End: 2021-06-30

## 2021-06-30 NOTE — TELEPHONE ENCOUNTER
Spoke with the patient regarding the results of the Afirma test which is benign, will discuss more regarding the plan during her visit next week

## 2021-07-08 ENCOUNTER — TELEPHONE (OUTPATIENT)
Dept: HEMATOLOGY ONCOLOGY | Facility: CLINIC | Age: 51
End: 2021-07-08

## 2021-07-08 ENCOUNTER — OFFICE VISIT (OUTPATIENT)
Dept: ENDOCRINOLOGY | Facility: CLINIC | Age: 51
End: 2021-07-08
Payer: COMMERCIAL

## 2021-07-08 VITALS
DIASTOLIC BLOOD PRESSURE: 70 MMHG | WEIGHT: 173.6 LBS | BODY MASS INDEX: 27.9 KG/M2 | HEART RATE: 59 BPM | HEIGHT: 66 IN | SYSTOLIC BLOOD PRESSURE: 110 MMHG

## 2021-07-08 DIAGNOSIS — E04.1 THYROID NODULE: Primary | ICD-10-CM

## 2021-07-08 PROCEDURE — 99214 OFFICE O/P EST MOD 30 MIN: CPT | Performed by: INTERNAL MEDICINE

## 2021-07-08 PROCEDURE — 3074F SYST BP LT 130 MM HG: CPT | Performed by: INTERNAL MEDICINE

## 2021-07-08 PROCEDURE — 3008F BODY MASS INDEX DOCD: CPT | Performed by: INTERNAL MEDICINE

## 2021-07-08 PROCEDURE — 1036F TOBACCO NON-USER: CPT | Performed by: INTERNAL MEDICINE

## 2021-07-08 PROCEDURE — 3078F DIAST BP <80 MM HG: CPT | Performed by: INTERNAL MEDICINE

## 2021-07-08 NOTE — TELEPHONE ENCOUNTER
New Patient Request   Patient Details:     Tano Fernandez      1970      [id number]      Reason for Appointment   Who is calling to schedule? Patient   If not Patient, what is their name? Majel Reap    What is the diagnosis? Thyroid nodule   Who is the referring doctor? Dr Dewitt Atiya are you scheduling with ? Surg [facility]   Preferred Ryan None    Best Number to call back on? If calling from the [facility], use the Nurse number  [phone]   Miscellaneous Information: Dr Brittney Gilman - Referral in [address] [place] [address] Rd  Patient requesting a Mon or Tues  if possible      Please advise the patient, a new patient  will be calling them back within 1 business day

## 2021-07-08 NOTE — PROGRESS NOTES
Pk Fair is a 49 y o  female presents for follow up after FNA with Afirma testing  Patient was seen in march 2021 for evaluation for thyroid nodules which were discovered in a MRI which was done in february 2021 for neck pain and showed Asymmetric enlargement of the right lobe of the thyroid gland for which thyroid US which showed thyroid nodules  She had 3 thyroid nodules reported on the right side, 1 2 x 1 4 x 1 0 cm with TRAD 4,   2 9 x 2 2 x 3 1 cm with TRAD 3, 3 3 x 2 7 x 3 4 cm with Trad 3,   She has FNA done for 2 of her thyroid nodules which is reported as atypia of undetermined significance([place] category III), Afirma was not done  Patient was recommended to repeat FNA with Afirma which was done and showed right mid thyroid nodule [place] II and right lower nodule with atypia of undetermined significance([place] category III), for which Afirma test was done and showed benign result which malignancy risk of 4%  She does not reports any compressive symptoms  Trouble swallowing  No  Hoarseness/change in voice:  {No  Trouble breathing  No     Modifying factors which can cause nodule formation:  H/o Radiation to the head or neck region No  History of thyroid cancer in one or more first degree relativesNo  Prior hemithyroidectomy with discovery of thyroid cancer No  Family history of thyroid disease: No     Fatigue: No  No weight loss/ No weight gain  NoChange in appetite  Noheat intolerance/ Nocold intolerance  Nodiarrhea/ Noconstipation  Nosweating/Notremor/Nopalpitation  Nodifficulty sleeping    She has long history of hypothyroidism for more than 15 years, and she is taking levothyroxine 125 mcg regularly and properly  She has history of sleeve gastric surgery last year and she lost 100 Ibs ever since,  All other systems were reviewed and are negative      Patient Active Problem List   Diagnosis    Generalized anxiety disorder    Essential hypertension    Generalized osteoarthritis of multiple sites    Acquired hypothyroidism    Stress incontinence, female    Surgical menopause    Lumbar disc disease    Chronic right-sided low back pain without sciatica    Lumbar radiculopathy    Hypertriglyceridemia    Marijuana use, episodic    Status post bariatric surgery      Past Medical History:   Diagnosis Date    Abnormal Pap smear of cervix     Cervical dysplasia     LAST ASSESSED: [id number]    HPV (human papilloma virus) infection     Hypertension     Hypothyroid       Past Surgical History:   Procedure Laterality Date    BILATERAL SALPINGOOPHORECTOMY Bilateral 2005    CERVICAL BIOPSY  W/ LOOP ELECTRODE EXCISION      COLPOSCOPY      TOTAL ABDOMINAL HYSTERECTOMY      OCCURRED PRIOR TO 2002, REASON GIVEN WAS RECURRENT CERVICAL DYSPLASIA    TUBAL LIGATION  1993    US GUIDED THYROID BIOPSY  3/19/2021    US GUIDED THYROID BIOPSY  6/15/2021      Family History   Problem Relation Age of Onset    Diabetes Father     Hypertension Father     Diabetes Family     Hypertension Family     Skin cancer Family     COPD Mother     Breast cancer Maternal Grandmother 61    No Known Problems Sister     No Known Problems Daughter     No Known Problems Maternal Grandfather     No Known Problems Paternal Grandmother     No Known Problems Paternal Grandfather     No Known Problems Maternal Aunt     No Known Problems Maternal Aunt     No Known Problems Paternal Aunt      Social History     Tobacco Use    Smoking status: Former Smoker    Smokeless tobacco: Never Used   Substance Use Topics    Alcohol use: Yes     Comment: SOCIAL     Allergies   Allergen Reactions    Ace Inhibitors Cough    Compazine [Prochlorperazine] Other (See Comments)     Not specified    Phenothiazines Confusion    Quinolones     Sulfamethoxazole-Trimethoprim         Physical Examination:  Vitals:    07/08/21 0830   BP: 110/70   Pulse: 59       General appearance - alert, well appearing, and in no distress  Mental status - normal mood, behavior, speech, dress, motor activity, and thought processes  HEENT: Normocephalic/ atraumatic  EOMI  Respiratory : CTAB; no wheeze; no crept  Cardiovascular: regular rate and rhythm,  Abdomen: soft, non distended, non tender  Extremities: No edema  DP 2 + No cyanosis/ no clubbing  Thyroid - right sided thyroid enlargement, not tender, nodules are not palpable        Lab Results   Component Value Date    TSH 1 38 06/14/2021    W0GPSLJ 10 7 06/14/2021        Final Diagnosis   A -B  Thyroid, right mid (fine needle aspiration):      - Benign ([place] Category II) - See note  - Adenomatoid nodule with cystic change       Satisfactory for evaluation      C  -D  Thyroid, right lower pole (fine needle aspiration):      - Atypia of undetermined significance ([place] Category III) - See note  - Focal nuclear atypia including elongation and irregularity       - Scant colloid  Right lobe:  6 9 x 2 7 x 2 5 cm   22 18 mL  Left lobe:  4 6 x 1 6 x 1 5 cm   5 55 mL  Isthmus:  0 5 cm      Nodule #1  Image 9  RIGHT midgland nodule measuring 1 2 x 1 4 x 1 0 cm  No priors for comparison  COMPOSITION:  2 points, solid or almost completely solid   ECHOGENICITY:  1 point, hyperechoic or isoechoic  SHAPE:  3 points, taller-than-wide  MARGIN: 0 points, smooth  ECHOGENIC FOCI:  0 points, none or large comet-tail artifacts  TI-RADS Classification: TR 4 (4-6 points), FNA if > 1 5 cm  Follow if > 1cm      Nodule #2  Image 21  RIGHT midgland nodule measuring 2 9 x 2 2 x 3 1 cm  No priors for comparison  COMPOSITION:  2 points, solid or almost completely solid   ECHOGENICITY:  1 point, hyperechoic or isoechoic  SHAPE:  0 points, wider-than-tall  MARGIN: 0 points, smooth  ECHOGENIC FOCI:  0 points, none or large comet-tail artifacts  TI-RADS Classification: TR 3 (3 points), FNA if >2 5 cm  Follow if >1 5 cm      Nodule #3  Image 30    RIGHT lower pole nodule measuring 3 3 x 2 7 x 3 4 cm  COMPOSITION:  2 points, solid or almost completely solid   ECHOGENICITY:  1 point, hyperechoic or isoechoic  SHAPE:  0 points, wider-than-tall  MARGIN: 0 points, smooth  ECHOGENIC FOCI:  0 points, none or large comet-tail artifacts  TI-RADS Classification: TR 3 (3 points), FNA if >2 5 cm  Follow if >1 5 cm            IMPRESSION:     The following meet current ACR criteria for recommending ultrasound guided biopsy:         The 2 9 x 2 2 x 3 1 cm right mid gland nodule  (Image number 21) (CRITERIA: TR 3, Mildly suspicious  FNA if >2 5 cm       The 3 3 x 2 7 x 3 4 cm right lower gland nodule  (Image number 30) (CRITERIA: TR 3, Mildly suspicious  FNA if >2 5 cm       Right mid gland nodule for which one-year follow-up is recommended      Reference: ACR Thyroid Imaging, Reporting and Data System (TI-RADS): White Paper of the [employer]  J AM Nora Radiol 8115;44:[id number]  (additional recommendations based on American Thyroid Association 2015 guidelines )     The study was marked in EPIC for significant notification  ASSESSMENT/PLAN:              1  Thyroid nodules   incidicentally discovered in Neck MRI and confirmed in thyroid US, which showed  right-sided thyroid nodules, midgland nodule measuring 1 2 x 1 4 x 1 0 cm  With TRAD 4, 2 9 x 2 2 x 3 1 cm with TRAD 3,  And 3 3 x 2 7 x 3 4 cm with Trad 3,  Initial FNA without Afirma showed atypia of undetermined significance  Recommended to repeat FNA with [facility] and showed mid right nodule [place] II and right lower nodule showed atypia of undetermined significance ([place] III), Afirma showed assuring results, (benign results with 4% chance of malignancy)  She has no compressive symptoms    We discussed with her regarding the options including the yearly ultrasounds surveillance vs oncology surgery referral for right hemithyroidectomy, and patient is interested in hemithyroidectomy,   She was referred to Surgical Oncology for further management  Hypothyroidism; On levothyroxine 125 mcg once daily  Patient is clinically and biochemically euthyroid  She was advised to take levothyroxine regularly and properly(1st thing in the morning with empty stomach and with other medication 2 hours apart  See her back in the office in 3 months  TSH and free T4 before next visit  AVS printed, reviewed with patient and given to patient  The patient indicates understanding of these issues and agrees with the plan  Portions of the record may have been created with voice recognition software  Occasional wrong word or "sound a like" substitutions may have occurred due to the inherent limitations of voice recognition software  Read the chart carefully and recognize, using context, where substitutions have occurred

## 2021-07-09 ENCOUNTER — TELEPHONE (OUTPATIENT)
Dept: SURGICAL ONCOLOGY | Facility: CLINIC | Age: 51
End: 2021-07-09

## 2021-07-09 NOTE — TELEPHONE ENCOUNTER
New Patient Encounter    New Patient Intake Form   Patient Details:  Shemar Romero  1970  [id number]    Background Information:  [address] starts by opening a telephone encounter and gathering the following information   Who is calling to schedule? If not self, relationship to patient? Patient   Referring Provider Dr Felton    What is the diagnosis? E04 1 (ICD-10-CM) - Thyroid nodule - atypia of undetermined significance   Is this Cancer or Non-Cancer? Cancer   Is this diagnosis confirmed? Yes   When was the diagnosis? 6/2021   Is there a confirmed diagnosis from a biopsy/tissue reviewed by pathology? Yes   Were outside slides requested? No   Is patient aware of diagnosis? Yes   Is there a personal history and what kind? No   Is there a family history and what kind? No   Reason for visit? New Diagnosis   Have you had any imaging or labs done? If so: when, where? yes  [facility]   Are records in [facility]? yes   If patient has a prior history of cancer were old records obtained? NA   Was the patient told to bring a disk? No   Does the patient smoke or Vape? No   If yes, how many packs or cartridges per day? Scheduling Information:   Preferred [place]:  Any     Are there any dates/time the patient cannot be seen? Miscellaneous: pt chose appt date of 8/10/21 with Dr Darren Morris  After completing the above information, please route to Financial Counselor and the appropriate Nurse Navigator for review

## 2021-07-19 DIAGNOSIS — E03.9 ACQUIRED HYPOTHYROIDISM: ICD-10-CM

## 2021-07-19 RX ORDER — LEVOTHYROXINE SODIUM 0.12 MG/1
125 TABLET ORAL DAILY
Qty: 90 TABLET | Refills: 1 | Status: SHIPPED | OUTPATIENT
Start: 2021-07-19 | End: 2021-12-21

## 2021-08-09 ENCOUNTER — OFFICE VISIT (OUTPATIENT)
Dept: OBGYN CLINIC | Facility: CLINIC | Age: 51
End: 2021-08-09

## 2021-08-09 ENCOUNTER — TELEPHONE (OUTPATIENT)
Dept: SURGICAL ONCOLOGY | Facility: CLINIC | Age: 51
End: 2021-08-09

## 2021-08-09 VITALS
SYSTOLIC BLOOD PRESSURE: 106 MMHG | HEIGHT: 66 IN | DIASTOLIC BLOOD PRESSURE: 73 MMHG | HEART RATE: 66 BPM | WEIGHT: 167 LBS | BODY MASS INDEX: 26.84 KG/M2

## 2021-08-09 DIAGNOSIS — N93.9 VAGINAL BLEEDING: Primary | ICD-10-CM

## 2021-08-09 PROBLEM — E04.1 THYROID NODULE: Status: ACTIVE | Noted: 2021-08-09

## 2021-08-09 PROCEDURE — 3074F SYST BP LT 130 MM HG: CPT | Performed by: NURSE PRACTITIONER

## 2021-08-09 PROCEDURE — 3078F DIAST BP <80 MM HG: CPT | Performed by: NURSE PRACTITIONER

## 2021-08-09 PROCEDURE — 99213 OFFICE O/P EST LOW 20 MIN: CPT | Performed by: NURSE PRACTITIONER

## 2021-08-09 NOTE — PROGRESS NOTES
Assessment/Plan:       Diagnoses and all orders for this visit:    Vaginal bleeding  -     conjugated estrogens (PREMARIN) vaginal cream; Use 3 times a week at night for the first week, 2 times a week the second week, and then weekly x 3 months      Plan  Annual GYN exam is due 2/2022     Subjective:     PLAN  Use Premarin Cream 3 times a week x 1 week  2 times a week x 1 week, then weekly x 3 months  Continue to use lubricant as previously directed  Annual GYN exam is due 2/2022  Call with needs or concerns  Pt verbalized understanding of all discussed  Patient ID: Elaine Rios is a 49 y o  female  HPI  Pt presents with a Hx of VB after intercourse  Pt states she has a new partner and has been having intercouse, the first time in 6 years and now has VB after intercourse  Pt has a Hx of MIGUEL BSO she said for positive HPV  Last WNL PAP and negative HPV was 2/2021  Pt was previously directed to increase foreplay and use a lubricant      Explained that vaginal tissue on the posterior wall of her vagina is ffriable  Safe and effective use of Premair Cream was provided      The following portions of the patient's history were reviewed and updated as appropriate: allergies, current medications, past family history, past medical history, past social history, past surgical history and problem list     Review of Systems      Pertinent items are note in the HPI    Objective:      /73   Pulse 66   Ht 5' 6" (1 676 m)   Wt 75 8 kg (167 lb)   BMI 26 95 kg/m²          Physical Exam  Vitals reviewed  Constitutional:       Appearance: Normal appearance  Eyes:      General:         Right eye: No discharge  Left eye: No discharge  Pulmonary:      Effort: Pulmonary effort is normal  No respiratory distress  Musculoskeletal:         General: Normal range of motion  Cervical back: Normal range of motion  Skin:     General: Skin is warm and dry     Neurological:      Mental Status: She is alert and oriented to person, place, and time  Psychiatric:         Mood and Affect: Mood normal          Behavior: Behavior normal          Thought Content:  Thought content normal        Negative cough or SOB  Vulva negative lesions or erythema  Vagina friable on posterior wall negative lesions  Cervix surgically absent

## 2021-08-09 NOTE — PATIENT INSTRUCTIONS
Use Premarin Cream 3 times a week x 1 week  2 times a week x 1 week, then weekly x 3 months  Continue to use lubricant as previously directed  Annual GYN exam is due 2/2022  Call with needs or concerns    COVID-19 Instructions    If you are having any of the following:  Cough   Shortness of breath   Fever  If traveled within past 2 weeks internationally or to high risk [place] states  Or been in contact with someone that has     Please call either:   Your PCP office  [phone], option [phone]    They will screen you over the phone and direct you to the nearest appropriate testing location    DO NOT go to your PCP or OB office without calling first

## 2021-08-10 ENCOUNTER — TELEPHONE (OUTPATIENT)
Dept: GASTROENTEROLOGY | Facility: CLINIC | Age: 51
End: 2021-08-10

## 2021-08-10 ENCOUNTER — CONSULT (OUTPATIENT)
Dept: SURGICAL ONCOLOGY | Facility: CLINIC | Age: 51
End: 2021-08-10
Payer: COMMERCIAL

## 2021-08-10 VITALS
BODY MASS INDEX: 26.2 KG/M2 | HEIGHT: 66 IN | TEMPERATURE: 97.9 F | WEIGHT: 163 LBS | SYSTOLIC BLOOD PRESSURE: 102 MMHG | RESPIRATION RATE: 18 BRPM | HEART RATE: 68 BPM | DIASTOLIC BLOOD PRESSURE: 66 MMHG

## 2021-08-10 DIAGNOSIS — Z12.11 SPECIAL SCREENING FOR MALIGNANT NEOPLASMS, COLON: ICD-10-CM

## 2021-08-10 DIAGNOSIS — E04.1 THYROID NODULE: Primary | ICD-10-CM

## 2021-08-10 PROCEDURE — 3008F BODY MASS INDEX DOCD: CPT | Performed by: INTERNAL MEDICINE

## 2021-08-10 PROCEDURE — 99244 OFF/OP CNSLTJ NEW/EST MOD 40: CPT | Performed by: SURGERY

## 2021-08-10 RX ORDER — TRAMADOL HYDROCHLORIDE 50 MG/1
50 TABLET ORAL EVERY 6 HOURS PRN
Qty: 10 TABLET | Refills: 0 | Status: SHIPPED | OUTPATIENT
Start: 2021-08-10 | End: 2021-09-17

## 2021-08-10 RX ORDER — SOD SULF/POT CHLORIDE/MAG SULF 1.479 G
TABLET ORAL
Qty: 24 TABLET | Refills: 0 | Status: SHIPPED | OUTPATIENT
Start: 2021-08-10 | End: 2021-09-17

## 2021-08-10 NOTE — TELEPHONE ENCOUNTER
Patients GI provider:  Dr Piper Catherine    Number to return call: [phone]    Reason for call: Pt calling requesting to have pills ordered for her prep rather than the liquid  Stated she had the sleeve surgery and is unable to drink 32 ounces in 1/2 hour       Scheduled procedure/appointment date if applicable: procedure 9/55/22

## 2021-08-10 NOTE — PROGRESS NOTES
Surgical Oncology Follow Up       [facility] [place]  [address] [facility]  [place]a [place] [id number] [address] KELVIN Clarke  1970  [id number]  [facility] Renu Banegas  [address] [facility]  [place] [id number] [phone]    Chief Complaint   Patient presents with    Consult       Assessment/Plan:    No problem-specific Assessment & Plan notes found for this encounter  Diagnoses and all orders for this visit:    Thyroid nodule  -     Ambulatory referral to Surgical Oncology  -     traMADol (ULTRAM) 50 mg tablet; Take 1 tablet (50 mg total) by mouth every 6 (six) hours as needed for moderate pain  -     Case request operating room: THYROIDECTOMY, total; Standing  -     CBC and differential; Future  -     Comprehensive metabolic panel; Future  -     EKG 12 lead; Future  -     XR chest pa & lateral; Future  -     Case request operating room: THYROIDECTOMY, total    Other orders  -     Incentive spirometry; Standing  -     Insert and maintain IV line; Standing  -     Void On-Call to O R ; Standing  -     Place sequential compression device; Standing        Advance Care Planning/Advance Directives:  Discussed disease status, cancer treatment plans and/or cancer treatment goals with the patient  Oncology History    No history exists  History of Present Illness: The patient is a 52-year-old woman here with thyroid nodules  She had 2 in the right upper biopsied which ultimately came back as benign  However, they are large and bulky  She is asymptomatic and has no difficulty breathing  She has been on Synthroid for the last 15 years  She denies a history of radiation exposure to the head or neck area  She has no personal or family history of endocrine malignancies  Review of Systems   Constitutional: Negative  HENT: Negative    Negative for sore throat, trouble swallowing and voice change  Eyes: Negative  Respiratory: Negative  Cardiovascular: Negative  Gastrointestinal: Negative  Endocrine: Negative  Genitourinary: Negative  Musculoskeletal: Negative  Skin: Negative  Allergic/Immunologic: Negative  Neurological: Negative  Hematological: Negative  Psychiatric/Behavioral: Negative            Patient Active Problem List   Diagnosis    Generalized anxiety disorder    Essential hypertension    Generalized osteoarthritis of multiple sites    Acquired hypothyroidism    Stress incontinence, female    Surgical menopause    Lumbar disc disease    Chronic right-sided low back pain without sciatica    Lumbar radiculopathy    Hypertriglyceridemia    Marijuana use, episodic    Status post bariatric surgery    Thyroid nodule     Past Medical History:   Diagnosis Date    Abnormal Pap smear of cervix     Cervical dysplasia     LAST ASSESSED: [id number]    HPV (human papilloma virus) infection     Hypertension     Hypothyroid      Past Surgical History:   Procedure Laterality Date    BILATERAL SALPINGOOPHORECTOMY Bilateral 2005    CERVICAL BIOPSY  W/ LOOP ELECTRODE EXCISION      COLPOSCOPY      TOTAL ABDOMINAL HYSTERECTOMY      OCCURRED PRIOR TO 2002, REASON GIVEN WAS RECURRENT CERVICAL DYSPLASIA    TUBAL LIGATION  1993    US GUIDED THYROID BIOPSY  3/19/2021    US GUIDED THYROID BIOPSY  6/15/2021     Family History   Problem Relation Age of Onset    Diabetes Father     Hypertension Father     Diabetes Family     Hypertension Family     Skin cancer Family     COPD Mother     Breast cancer Maternal Grandmother 61    No Known Problems Sister     No Known Problems Daughter     No Known Problems Maternal Grandfather     No Known Problems Paternal Grandmother     No Known Problems Paternal Grandfather     No Known Problems Maternal Aunt     No Known Problems Maternal Aunt     No Known Problems Paternal Aunt Social History     Socioeconomic History    Marital status: Single     Spouse name: Not on file    Number of children: Not on file    Years of education: Not on file    Highest education level: Not on file   Occupational History    Not on file   Tobacco Use    Smoking status: Former Smoker    Smokeless tobacco: Never Used   Vaping Use    Vaping Use: Never used   Substance and Sexual Activity    Alcohol use: Yes     Comment: SOCIAL    Drug use: No     Comment: SUBSTANCE ABUSE IN THE PAST    Sexual activity: Yes     Partners: Male     Birth control/protection: Female Sterilization   Other Topics Concern    Not on file   Social History Narrative    USES SAFETY EQUIPMENT - [employer]     Social Determinants of Health     Financial Resource Strain: Low Risk     Difficulty of Paying Living Expenses: Not hard at all   Food Insecurity: No Food Insecurity    Worried About Running Out of Food in the Last Year: Never true    Shiv of Food in the Last Year: Never true   Transportation Needs: No Transportation Needs    Lack of Transportation (Medical): No    Lack of Transportation (Non-Medical):  No   Physical Activity:     Days of Exercise per Week:     Minutes of Exercise per Session:    Stress:     Feeling of Stress :    Social Connections:     Frequency of Communication with Friends and Family:     Frequency of Social Gatherings with Friends and Family:     Attends Christianity Services:     Active Member of Clubs or Organizations:     Attends Club or Organization Meetings:     Marital Status:    Intimate Partner Violence:     Fear of Current or Ex-Partner:     Emotionally Abused:     Physically Abused:     Sexually Abused:        Current Outpatient Medications:     levothyroxine 125 mcg tablet, Take 1 tablet (125 mcg total) by mouth daily, Disp: 90 tablet, Rfl: 1    conjugated estrogens (PREMARIN) vaginal cream, Use 3 times a week at night for the first week, 2 times a week the second week, and then weekly x 3 months (Patient not taking: Reported on 8/10/2021), Disp: 0 5 g, Rfl: 3    methocarbamol (ROBAXIN) 500 mg tablet, , Disp: , Rfl:     Sodium Sulfate-Mag Sulfate-KCl (Sutab) 3740-361-018 MG TABS, Please take tablets per instructions provided by GI clinic  (Patient not taking: Reported on 7/8/2021), Disp: 24 tablet, Rfl: 0    traMADol (ULTRAM) 50 mg tablet, Take 1 tablet (50 mg total) by mouth every 6 (six) hours as needed for moderate pain, Disp: 10 tablet, Rfl: 0  Allergies   Allergen Reactions    Ace Inhibitors Cough    Compazine [Prochlorperazine] Other (See Comments)     Not specified    Phenothiazines Confusion    Quinolones     Sulfamethoxazole-Trimethoprim      Vitals:    08/10/21 1013   BP: 102/66   Pulse: 68   Resp: 18   Temp: 97 9 °F (36 6 °C)       Physical Exam    Pathology:  Case Report   Non-gynecologic Cytology                          Case: [id number]                                   Authorizing Provider: Valentino Cancel, MD             Collected:           06/15/2021 0950               Ordering Location:     [facility]      Received:            06/15/2021 [address] [place] [address] Ultrasound                                                           Pathologist:           Hossein Givens MD                                                                 Specimens:   A) - Thyroid, Right, Mid                                                                             B) - Thyroid, Right, Mid                                                                             C) - Thyroid, Right, Lower Pole                                                                      D) - Thyroid, Right, Lower Pole                                                            Final Diagnosis   A -B  Thyroid, right mid (fine needle aspiration):      - Benign (Zanoni Category II) - See note       - Adenomatoid nodule with cystic change       Satisfactory for evaluation      C  -D  Thyroid, right lower pole (fine needle aspiration):      - Atypia of undetermined significance (Josephine Category III) - See note  - Focal nuclear atypia including elongation and irregularity       - Scant colloid       Satisfactory for evaluation      Note (Thyroid, Right Mid):   As reported in the [address] [place] [address] for Reporting Thyroid Cytopathology*, the diagnostic category of "benign/negative for malignancy" carries a 0-3% risk of malignancy being found in subsequent resections (in the few studies of patients with benign FNA results that were followed long-term, a false negative rate of 0-3% was reported), with the usual management being clinical follow-up supplemented by ultrasonography (US) as indicated  Repeat FNA may be indicated for nodules showing significant growth or developing US abnormalities  Ultimately, clinical/imaging correlation for this patient is needed in arriving at the actual management plan  *The Josephine System for Reporting Thyroid Cytopathology, Ebb Sessions , Tal Diaz ), 2018 (2nd ed )      Note (Thyroid, Right Lower):  (1) As reported in the [address] [place] [address] for Reporting Thyroid Cytopathology*, this diagnostic category has demonstrated anywhere from 10-30% risk of malignancy being found in subsequent resections and/or FNA  This risk of malignancy is expected to change due to the usage of the surgical pathology diagnosis of non-invasive follicular thyroid neoplasm with papillary-like nuclear features (NIFTP)    The anticipated risk of malignancy secondary to NIFTP is 6-18%  The manual reports that the usual management following this diagnosis is repeat FNA, molecular testing, or lobectomy  Ultimately, clinical/imaging correlation for this patient is needed in arriving at the actual management plan    *The Josephine System for Reporting Thyroid Cytopathology, Ebb Sessions , Tal Diaz ), 2018 (2nd ed )    Electronically signed by Rosa Melgar MD on 6/17/2021 at 12:34 PM       Labs:  Lab Results   Component Value Date    [id number] 5 377 (H) 04/15/2019    TSH 1 38 06/14/2021    B4AHKRK 10 7 06/14/2021         Imaging  THYROID ULTRASOUND     INDICATION:    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      COMPARISON:  None     TECHNIQUE:   Ultrasound of the thyroid was performed with a high frequency linear transducer in transverse and sagittal planes including volumetric imaging sweeps as well as traditional still imaging technique      FINDINGS:  Thyroid parenchyma is diffusely heterogeneous in echotexture with focal nodule(s) as described below      Right lobe:  6 9 x 2 7 x 2 5 cm   22 18 mL  Left lobe:  4 6 x 1 6 x 1 5 cm   5 55 mL  Isthmus:  0 5 cm      Nodule #1  Image 9  RIGHT midgland nodule measuring 1 2 x 1 4 x 1 0 cm  No priors for comparison  COMPOSITION:  2 points, solid or almost completely solid   ECHOGENICITY:  1 point, hyperechoic or isoechoic  SHAPE:  3 points, taller-than-wide  MARGIN: 0 points, smooth  ECHOGENIC FOCI:  0 points, none or large comet-tail artifacts  TI-RADS Classification: TR 4 (4-6 points), FNA if > 1 5 cm  Follow if > 1cm      Nodule #2  Image 21  RIGHT midgland nodule measuring 2 9 x 2 2 x 3 1 cm  No priors for comparison  COMPOSITION:  2 points, solid or almost completely solid   ECHOGENICITY:  1 point, hyperechoic or isoechoic  SHAPE:  0 points, wider-than-tall  MARGIN: 0 points, smooth  ECHOGENIC FOCI:  0 points, none or large comet-tail artifacts  TI-RADS Classification: TR 3 (3 points), FNA if >2 5 cm  Follow if >1 5 cm      Nodule #3  Image 30  RIGHT lower pole nodule measuring 3 3 x 2 7 x 3 4 cm  COMPOSITION:  2 points, solid or almost completely solid   ECHOGENICITY:  1 point, hyperechoic or isoechoic  SHAPE:  0 points, wider-than-tall  MARGIN: 0 points, smooth  ECHOGENIC FOCI:  0 points, none or large comet-tail artifacts    TI-RADS Classification: TR 3 (3 points), FNA if >2 5 cm  Follow if >1 5 cm            IMPRESSION:     The following meet current ACR criteria for recommending ultrasound guided biopsy:         The 2 9 x 2 2 x 3 1 cm right mid gland nodule  (Image number 21) (CRITERIA: TR 3, Mildly suspicious  FNA if >2 5 cm       The 3 3 x 2 7 x 3 4 cm right lower gland nodule  (Image number 30) (CRITERIA: TR 3, Mildly suspicious  FNA if >2 5 cm       Right mid gland nodule for which one-year follow-up is recommended      Reference: ACR Thyroid Imaging, Reporting and Data System (TI-RADS): White Paper of the [employer]  J AM Nora Radiol 4036;01:[id number]  (additional recommendations based on American Thyroid Association 2015 guidelines )     The study was marked in [facility] for significant notification         Workstation performed: [id number]  I reviewed the above laboratory and imaging data  Discussion/Summary:  Goiter, right side, multiple nodules with biopsies being benign  She is already in a full dose of thyroid replacement  She is opting for total thyroidectomy to obviate needing continued surveillance of the thyroid on the left  If only right hemithyroidectomy more performed to address the present nodules  I think this is reasonable, particularly given that she is already on full replacement, and that she would like to avoid potential need for 2nd operation should malignancy be found on the right side  We therefore consented and scheduled total thyroidectomy  Risks and benefits of surgery including infection, bleeding, need for possible additional surgery, were discussed with her  She understands the plan wishes to proceed as outlined

## 2021-08-10 NOTE — LETTER
August 10, 2021     Jaz Mays, [address]    Patient: Landon Dsouza   YOB: 1970   Date of Visit: 8/10/2021       Dear Dr Ariadna Macedo: Thank you for referring Fareed Elliott to me for evaluation  Below are my notes for this consultation  If you have questions, please do not hesitate to call me  I look forward to following your patient along with you  Sincerely,        Renata Espinal MD        CC: DO Kimberlee Woodson MD Marguerite Guan, MD  8/10/2021 10:48 AM  Signed               Surgical Oncology Follow Up       [address] [facility] CANCER [facility] [address] [id number] [address]  1970  [id number]  [facility] SURGICAL ONCOLOGY [address] [address] [phone]    Chief Complaint   Patient presents with    Consult       Assessment/Plan:    No problem-specific Assessment & Plan notes found for this encounter  Diagnoses and all orders for this visit:    Thyroid nodule  -     Ambulatory referral to Surgical Oncology  -     traMADol (ULTRAM) 50 mg tablet; Take 1 tablet (50 mg total) by mouth every 6 (six) hours as needed for moderate pain  -     Case request operating room: THYROIDECTOMY, total; Standing  -     CBC and differential; Future  -     Comprehensive metabolic panel; Future  -     EKG 12 lead; Future  -     XR chest pa & lateral; Future  -     Case request operating room: THYROIDECTOMY, total    Other orders  -     Incentive spirometry; Standing  -     Insert and maintain IV line; Standing  -     Void On-Call to O R ; Standing  -     Place sequential compression device; Standing        Advance Care Planning/Advance Directives:  Discussed disease status, cancer treatment plans and/or cancer treatment goals with the patient       Oncology History    No history exists  History of Present Illness: The patient is a 73-year-old woman here with thyroid nodules  She had 2 in the right upper biopsied which ultimately came back as benign  However, they are large and bulky  She is asymptomatic and has no difficulty breathing  She has been on Synthroid for the last 15 years  She denies a history of radiation exposure to the head or neck area  She has no personal or family history of endocrine malignancies  Review of Systems   Constitutional: Negative  HENT: Negative  Negative for sore throat, trouble swallowing and voice change  Eyes: Negative  Respiratory: Negative  Cardiovascular: Negative  Gastrointestinal: Negative  Endocrine: Negative  Genitourinary: Negative  Musculoskeletal: Negative  Skin: Negative  Allergic/Immunologic: Negative  Neurological: Negative  Hematological: Negative  Psychiatric/Behavioral: Negative            Patient Active Problem List   Diagnosis    Generalized anxiety disorder    Essential hypertension    Generalized osteoarthritis of multiple sites    Acquired hypothyroidism    Stress incontinence, female    Surgical menopause    Lumbar disc disease    Chronic right-sided low back pain without sciatica    Lumbar radiculopathy    Hypertriglyceridemia    Marijuana use, episodic    Status post bariatric surgery    Thyroid nodule     Past Medical History:   Diagnosis Date    Abnormal Pap smear of cervix     Cervical dysplasia     LAST ASSESSED: [id number]    HPV (human papilloma virus) infection     Hypertension     Hypothyroid      Past Surgical History:   Procedure Laterality Date    BILATERAL SALPINGOOPHORECTOMY Bilateral 2005    CERVICAL BIOPSY  W/ LOOP ELECTRODE EXCISION      COLPOSCOPY      TOTAL ABDOMINAL HYSTERECTOMY      OCCURRED PRIOR TO 2002, REASON GIVEN WAS RECURRENT CERVICAL DYSPLASIA    TUBAL LIGATION  1993    US GUIDED THYROID BIOPSY  3/19/2021    US GUIDED THYROID BIOPSY  6/15/2021     Family History   Problem Relation Age of Onset    Diabetes Father     Hypertension Father     Diabetes Family     Hypertension Family     Skin cancer Family     COPD Mother     Breast cancer Maternal Grandmother 61    No Known Problems Sister     No Known Problems Daughter     No Known Problems Maternal Grandfather     No Known Problems Paternal Grandmother     No Known Problems Paternal Grandfather     No Known Problems Maternal Aunt     No Known Problems Maternal Aunt     No Known Problems Paternal Aunt      Social History     Socioeconomic History    Marital status: Single     Spouse name: Not on file    Number of children: Not on file    Years of education: Not on file    Highest education level: Not on file   Occupational History    Not on file   Tobacco Use    Smoking status: Former Smoker    Smokeless tobacco: Never Used   Vaping Use    Vaping Use: Never used   Substance and Sexual Activity    Alcohol use: Yes     Comment: SOCIAL    Drug use: No     Comment: SUBSTANCE ABUSE IN THE PAST    Sexual activity: Yes     Partners: Male     Birth control/protection: Female Sterilization   Other Topics Concern    Not on file   Social History Narrative    USES SAFETY EQUIPMENT - [employer]     Social Determinants of Health     Financial Resource Strain: Low Risk     Difficulty of Paying Living Expenses: Not hard at all   Food Insecurity: No Food Insecurity    Worried About Running Out of Food in the Last Year: Never true    Shiv of Food in the Last Year: Never true   Transportation Needs: No Transportation Needs    Lack of Transportation (Medical): No    Lack of Transportation (Non-Medical):  No   Physical Activity:     Days of Exercise per Week:     Minutes of Exercise per Session:    Stress:     Feeling of Stress :    Social Connections:     Frequency of Communication with Friends and Family:     Frequency of Social Gatherings with Friends and Family:  Attends Nondenominational Services:     Active Member of Clubs or Organizations:     Attends Club or Organization Meetings:     Marital Status:    Intimate Partner Violence:     Fear of Current or Ex-Partner:     Emotionally Abused:     Physically Abused:     Sexually Abused:        Current Outpatient Medications:     levothyroxine 125 mcg tablet, Take 1 tablet (125 mcg total) by mouth daily, Disp: 90 tablet, Rfl: 1    conjugated estrogens (PREMARIN) vaginal cream, Use 3 times a week at night for the first week, 2 times a week the second week, and then weekly x 3 months (Patient not taking: Reported on 8/10/2021), Disp: 0 5 g, Rfl: 3    methocarbamol (ROBAXIN) 500 mg tablet, , Disp: , Rfl:     Sodium Sulfate-Mag Sulfate-KCl (Sutab) 6886-070-053 MG TABS, Please take tablets per instructions provided by GI clinic   (Patient not taking: Reported on 7/8/2021), Disp: 24 tablet, Rfl: 0    traMADol (ULTRAM) 50 mg tablet, Take 1 tablet (50 mg total) by mouth every 6 (six) hours as needed for moderate pain, Disp: 10 tablet, Rfl: 0  Allergies   Allergen Reactions    Ace Inhibitors Cough    Compazine [Prochlorperazine] Other (See Comments)     Not specified    Phenothiazines Confusion    Quinolones     Sulfamethoxazole-Trimethoprim      Vitals:    08/10/21 1013   BP: 102/66   Pulse: 68   Resp: 18   Temp: 97 9 °F (36 6 °C)       Physical Exam    Pathology:  Case Report   Non-gynecologic Cytology                          Case: [id number]                                   Authorizing Provider: Jolene King MD             Collected:           06/15/2021 0950               Ordering Location:     [address] [place] [address]      Received:            06/15/2021 [address] [place] [address] Ultrasound                                                           Pathologist:           Judith Mata MD                                                                 Specimens:   A) - Thyroid, Right, Mid                                                                             B) - Thyroid, Right, Mid                                                                             C) - Thyroid, Right, Lower Pole                                                                      D) - Thyroid, Right, Lower Pole                                                            Final Diagnosis   A -B  Thyroid, right mid (fine needle aspiration):      - Benign ([place] Category II) - See note  - Adenomatoid nodule with cystic change       Satisfactory for evaluation      C  -D  Thyroid, right lower pole (fine needle aspiration):      - Atypia of undetermined significance ([place] Category III) - See note  - Focal nuclear atypia including elongation and irregularity       - Scant colloid       Satisfactory for evaluation      Note (Thyroid, Right Mid):   As reported in the [address] [place] [address] for Reporting Thyroid Cytopathology*, the diagnostic category of "benign/negative for malignancy" carries a 0-3% risk of malignancy being found in subsequent resections (in the few studies of patients with benign FNA results that were followed long-term, a false negative rate of 0-3% was reported), with the usual management being clinical follow-up supplemented by ultrasonography (US) as indicated  Repeat FNA may be indicated for nodules showing significant growth or developing US abnormalities  Ultimately, clinical/imaging correlation for this patient is needed in arriving at the actual management plan  *The [place] System for Reporting Thyroid Cytopathology, Davian Estrada ), 2018 (2nd ed )      Note (Thyroid, Right Lower):  (1) As reported in the [address] [place], [place] [address] for Reporting Thyroid Cytopathology*, this diagnostic category has demonstrated anywhere from 10-30% risk of malignancy being found in subsequent resections and/or FNA    This risk of malignancy is expected to change due to the usage of the surgical pathology diagnosis of non-invasive follicular thyroid neoplasm with papillary-like nuclear features (NIFTP)    The anticipated risk of malignancy secondary to NIFTP is 6-18%  The manual reports that the usual management following this diagnosis is repeat FNA, molecular testing, or lobectomy  Ultimately, clinical/imaging correlation for this patient is needed in arriving at the actual management plan  *The [place] System for Reporting Thyroid Cytopathology, Zaid Corona Daily Pricila Florez ), 2018 (2nd ed )    Electronically signed by Sagar Yadav MD on 6/17/2021 at 12:34 PM       Labs:  Lab Results   Component Value Date    [id number] 5 377 (H) 04/15/2019    TSH 1 38 06/14/2021    B9LOGRW 10 7 06/14/2021         Imaging  THYROID ULTRASOUND     INDICATION:    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      COMPARISON:  None     TECHNIQUE:   Ultrasound of the thyroid was performed with a high frequency linear transducer in transverse and sagittal planes including volumetric imaging sweeps as well as traditional still imaging technique      FINDINGS:  Thyroid parenchyma is diffusely heterogeneous in echotexture with focal nodule(s) as described below      Right lobe:  6 9 x 2 7 x 2 5 cm   22 18 mL  Left lobe:  4 6 x 1 6 x 1 5 cm   5 55 mL  Isthmus:  0 5 cm      Nodule #1  Image 9  RIGHT midgland nodule measuring 1 2 x 1 4 x 1 0 cm  No priors for comparison  COMPOSITION:  2 points, solid or almost completely solid   ECHOGENICITY:  1 point, hyperechoic or isoechoic  SHAPE:  3 points, taller-than-wide  MARGIN: 0 points, smooth  ECHOGENIC FOCI:  0 points, none or large comet-tail artifacts  TI-RADS Classification: TR 4 (4-6 points), FNA if > 1 5 cm  Follow if > 1cm      Nodule #2  Image 21  RIGHT midgland nodule measuring 2 9 x 2 2 x 3 1 cm  No priors for comparison  COMPOSITION:  2 points, solid or almost completely solid   ECHOGENICITY:  1 point, hyperechoic or isoechoic      SHAPE:  0 points, wider-than-tall  MARGIN: 0 points, smooth  ECHOGENIC FOCI:  0 points, none or large comet-tail artifacts  TI-RADS Classification: TR 3 (3 points), FNA if >2 5 cm  Follow if >1 5 cm      Nodule #3  Image 30  RIGHT lower pole nodule measuring 3 3 x 2 7 x 3 4 cm  COMPOSITION:  2 points, solid or almost completely solid   ECHOGENICITY:  1 point, hyperechoic or isoechoic  SHAPE:  0 points, wider-than-tall  MARGIN: 0 points, smooth  ECHOGENIC FOCI:  0 points, none or large comet-tail artifacts  TI-RADS Classification: TR 3 (3 points), FNA if >2 5 cm  Follow if >1 5 cm            IMPRESSION:     The following meet current ACR criteria for recommending ultrasound guided biopsy:         The 2 9 x 2 2 x 3 1 cm right mid gland nodule  (Image number 21) (CRITERIA: TR 3, Mildly suspicious  FNA if >2 5 cm       The 3 3 x 2 7 x 3 4 cm right lower gland nodule  (Image number 30) (CRITERIA: TR 3, Mildly suspicious  FNA if >2 5 cm       Right mid gland nodule for which one-year follow-up is recommended      Reference: ACR Thyroid Imaging, Reporting and Data System (TI-RADS): White Paper of the [place]  J AM Nora Radiol 2018;86:020-981  (additional recommendations based on American Thyroid Association 2015 guidelines )     The study was marked in [place] [address] for significant notification         Workstation performed: [id number]  I reviewed the above laboratory and imaging data  Discussion/Summary:  Goiter, right side, multiple nodules with biopsies being benign  She is already in a full dose of thyroid replacement  She is opting for total thyroidectomy to obviate needing continued surveillance of the thyroid on the left  If only right hemithyroidectomy more performed to address the present nodules    I think this is reasonable, particularly given that she is already on full replacement, and that she would like to avoid potential need for 2nd operation should malignancy be found on the right side   We therefore consented and scheduled total thyroidectomy  Risks and benefits of surgery including infection, bleeding, need for possible additional surgery, were discussed with her  She understands the plan wishes to proceed as outlined

## 2021-08-17 NOTE — TELEPHONE ENCOUNTER
Pt states sutab is too expensive, and would like to know if there is another alternative, as she cannot drink that much liquid due to sleeve surgery

## 2021-08-18 NOTE — TELEPHONE ENCOUNTER
Spoke to Jolene Odonnell at [facility] and stated she will set aside sutab sample for colonoscopy  Called pt and made aware to go to [facility] to  sample   Pt agreeable to sample and verbalized understanding,

## 2021-08-27 ENCOUNTER — ANESTHESIA EVENT (OUTPATIENT)
Dept: ANESTHESIOLOGY | Facility: HOSPITAL | Age: 51
End: 2021-08-27

## 2021-08-27 ENCOUNTER — ANESTHESIA (OUTPATIENT)
Dept: ANESTHESIOLOGY | Facility: HOSPITAL | Age: 51
End: 2021-08-27

## 2021-08-27 ENCOUNTER — TELEPHONE (OUTPATIENT)
Dept: GASTROENTEROLOGY | Facility: MEDICAL CENTER | Age: 51
End: 2021-08-27

## 2021-08-28 NOTE — ANESTHESIA PREPROCEDURE EVALUATION
Procedure:  PRE-OP ONLY    Relevant Problems   CARDIO   (+) Essential hypertension   (+) Hypertriglyceridemia      ENDO   (+) Acquired hypothyroidism      GI/HEPATIC   (+) Status post bariatric surgery      MUSCULOSKELETAL   (+) Chronic right-sided low back pain without sciatica   (+) Generalized osteoarthritis of multiple sites      NEURO/PSYCH   (+) Generalized anxiety disorder

## 2021-08-28 NOTE — ANESTHESIA PREPROCEDURE EVALUATION
Procedure:  PRE-OP ONLY    Relevant Problems   ANESTHESIA (within normal limits)      CARDIO   (+) Essential hypertension   (+) Hypertriglyceridemia      ENDO   (+) Acquired hypothyroidism      GI/HEPATIC   (+) Status post bariatric surgery      /RENAL (within normal limits)      GYN (within normal limits)      HEMATOLOGY (within normal limits)      MUSCULOSKELETAL   (+) Chronic right-sided low back pain without sciatica   (+) Generalized osteoarthritis of multiple sites      NEURO/PSYCH   (+) Generalized anxiety disorder      PULMONARY (within normal limits)             Anesthesia Plan  ASA Score- 2     Anesthesia Type- IV sedation with anesthesia with ASA Monitors  Additional Monitors:   Airway Plan:           Plan Factors-Exercise tolerance (METS): >4 METS  Chart reviewed  Patient summary reviewed  Patient is not a current smoker  Patient instructed to abstain from smoking on day of procedure  Patient did not smoke on day of surgery  Induction- intravenous  Postoperative Plan-     Informed Consent- Anesthetic plan and risks discussed with patient

## 2021-08-30 ENCOUNTER — ANESTHESIA (OUTPATIENT)
Dept: GASTROENTEROLOGY | Facility: MEDICAL CENTER | Age: 51
End: 2021-08-30

## 2021-08-30 ENCOUNTER — ANESTHESIA EVENT (OUTPATIENT)
Dept: GASTROENTEROLOGY | Facility: MEDICAL CENTER | Age: 51
End: 2021-08-30

## 2021-08-30 ENCOUNTER — HOSPITAL ENCOUNTER (OUTPATIENT)
Dept: GASTROENTEROLOGY | Facility: MEDICAL CENTER | Age: 51
Setting detail: OUTPATIENT SURGERY
Discharge: HOME | End: 2021-08-30
Admitting: INTERNAL MEDICINE
Payer: COMMERCIAL

## 2021-08-30 VITALS
BODY MASS INDEX: 25.55 KG/M2 | SYSTOLIC BLOOD PRESSURE: 110 MMHG | HEART RATE: 62 BPM | WEIGHT: 159 LBS | DIASTOLIC BLOOD PRESSURE: 55 MMHG | TEMPERATURE: 97.3 F | HEIGHT: 66 IN | OXYGEN SATURATION: 100 % | RESPIRATION RATE: 17 BRPM

## 2021-08-30 DIAGNOSIS — Z12.11 SPECIAL SCREENING FOR MALIGNANT NEOPLASMS, COLON: ICD-10-CM

## 2021-08-30 PROCEDURE — 45385 COLONOSCOPY W/LESION REMOVAL: CPT | Performed by: INTERNAL MEDICINE

## 2021-08-30 PROCEDURE — 88305 TISSUE EXAM BY PATHOLOGIST: CPT | Performed by: PATHOLOGY

## 2021-08-30 RX ORDER — PROPOFOL 10 MG/ML
INJECTION, EMULSION INTRAVENOUS AS NEEDED
Status: DISCONTINUED | OUTPATIENT
Start: 2021-08-30 | End: 2021-08-30

## 2021-08-30 RX ORDER — SODIUM CHLORIDE 9 MG/ML
125 INJECTION, SOLUTION INTRAVENOUS CONTINUOUS
Status: DISCONTINUED | OUTPATIENT
Start: 2021-08-30 | End: 2021-09-03 | Stop reason: HOSPADM

## 2021-08-30 RX ORDER — ONDANSETRON 2 MG/ML
4 INJECTION INTRAMUSCULAR; INTRAVENOUS EVERY 6 HOURS PRN
Status: DISCONTINUED | OUTPATIENT
Start: 2021-08-30 | End: 2021-09-03 | Stop reason: HOSPADM

## 2021-08-30 RX ADMIN — PROPOFOL 30 MG: 10 INJECTION, EMULSION INTRAVENOUS at 10:37

## 2021-08-30 RX ADMIN — PROPOFOL 30 MG: 10 INJECTION, EMULSION INTRAVENOUS at 10:40

## 2021-08-30 RX ADMIN — PROPOFOL 30 MG: 10 INJECTION, EMULSION INTRAVENOUS at 10:29

## 2021-08-30 RX ADMIN — PROPOFOL 130 MG: 10 INJECTION, EMULSION INTRAVENOUS at 10:26

## 2021-08-30 RX ADMIN — PROPOFOL 40 MG: 10 INJECTION, EMULSION INTRAVENOUS at 10:31

## 2021-08-30 RX ADMIN — PROPOFOL 40 MG: 10 INJECTION, EMULSION INTRAVENOUS at 10:34

## 2021-08-30 RX ADMIN — SODIUM CHLORIDE 125 ML/HR: 0.9 INJECTION, SOLUTION INTRAVENOUS at 10:10

## 2021-08-30 RX ADMIN — Medication 40 MG: at 10:30

## 2021-08-30 NOTE — DISCHARGE INSTRUCTIONS
Colonoscopy   WHAT YOU NEED TO KNOW:   A colonoscopy is a procedure to examine the inside of your colon (intestine) with a scope  Polyps or tissue growths may have been removed during your colonoscopy  It is normal to feel bloated and to have some abdominal discomfort  You should be passing gas  If you have hemorrhoids or you had polyps removed, you may have a small amount of bleeding  DISCHARGE INSTRUCTIONS:   Seek care immediately if:    You have sudden, severe abdominal pain   You have problems swallowing   You have a large amount of black, sticky bowel movements or blood in your bowel movements   You have sudden trouble breathing   You feel weak, lightheaded, or faint or your heart beats faster than normal for you  Contact your healthcare provider if:    You have a fever and chills   You have nausea or are vomiting   Your abdomen is bloated or feels full and hard   You have abdominal pain   You have black, sticky bowel movements or blood in your bowel movements   You have not had a bowel movement for 3 days after your procedure   You have rash or hives   You have questions or concerns about your procedure  Activity:    Do not lift, strain, or run for 24 hours after your procedure   Rest after your procedure  You have been given medicine to relax you  Do not drive or make important decisions until the day after your procedure  Return to your normal activity as directed   Relieve gas and discomfort from bloating by lying on your right side with a heating pad on your abdomen  You may need to take short walks to help the gas move out  Eat small meals until bloating is relieved  Follow up with your healthcare provider as directed: Write down your questions so you remember to ask them during your visits  If you take a blood thinner, please review the specific instructions from your endoscopist about when you should resume it   These can be found in the Recommendation and Your Medication list sections of this After Visit Summary  Colorectal Polyps   WHAT YOU NEED TO KNOW:   Colorectal polyps are small growths of tissue in the lining of the colon and rectum  Most polyps are hyperplastic polyps and are usually benign (noncancerous)  Certain types of polyps, called adenomatous polyps, may turn into cancer  DISCHARGE INSTRUCTIONS:   Follow up with your healthcare provider or gastroenterologist as directed: You may need to return for more tests, such as another colonoscopy  Write down your questions so you remember to ask them during your visits  Reduce your risk for colorectal polyps:   · Eat a variety of healthy foods:  Healthy foods include fruit, vegetables, whole-grain breads, low-fat dairy products, beans, lean meat, and fish  Ask if you need to be on a special diet  · Maintain a healthy weight:  Ask your healthcare provider if you need to lose weight and how much you need to lose  Ask for help with a weight loss program     · Exercise:  Begin to exercise slowly and do more as you get stronger  Talk with your healthcare provider before you start an exercise program      · Limit alcohol:  Your risk for polyps increases the more you drink  · Do not smoke: If you smoke, it is never too late to quit  Ask for information about how to stop  For support and more information:   · Donovan Duong ([facility]) [address], [place] [address]  Phone: [phone] [phone]  Web Address: www digestive  [facility] gov    Contact your healthcare provider or gastroenterologist if:   · You have a fever  · You have chills, a cough, or feel weak and achy  · You have abdominal pain that does not go away or gets worse after you take medicine  · Your abdomen is swollen  · You are losing weight without trying  · You have questions or concerns about your condition or care      Seek care immediately or call 911 if:   · You have sudden shortness of breath  · You have a fast heart rate, fast breathing, or are too dizzy to stand up  · You have severe abdominal pain  · You see blood in your bowel movement  © Copyright [address] Information is for End User's use only and may not be sold, redistributed or otherwise used for commercial purposes  All illustrations and images included in CareNotes® are the copyrighted property of [employer]  or [facility] Arabella Goss   The above information is an  only  It is not intended as medical advice for individual conditions or treatments  Talk to your doctor, nurse or pharmacist before following any medical regimen to see if it is safe and effective for you  no rash

## 2021-08-30 NOTE — H&P
H&P EXAM - Outpatient Endoscopy   Ramón May 49 y o  female MRN: [id number]    [address] LAB PRE/PST   Encounter: [id number]        History and Physical - [facility]  Ramón May 49 y o  female MRN: [id number]                  HPI: Ramón May is a 49y o  year old female who presents for colon cancer screening      REVIEW OF SYSTEMS: Per the HPI, and otherwise unremarkable      Historical Information   Past Medical History:   Diagnosis Date    Abnormal Pap smear of cervix     Cervical dysplasia     LAST ASSESSED: [id number]    HPV (human papilloma virus) infection     Hypothyroid      Past Surgical History:   Procedure Laterality Date    BILATERAL SALPINGOOPHORECTOMY Bilateral 2005    CERVICAL BIOPSY  W/ LOOP ELECTRODE EXCISION      COLPOSCOPY      GASTRECTOMY SLEEVE LAPAROSCOPIC  2020    TOTAL ABDOMINAL HYSTERECTOMY      OCCURRED PRIOR TO 2002, REASON GIVEN WAS RECURRENT CERVICAL DYSPLASIA    TUBAL LIGATION  1993    US GUIDED THYROID BIOPSY  3/19/2021    US GUIDED THYROID BIOPSY  6/15/2021     Social History   Social History     Substance and Sexual Activity   Alcohol Use Yes    Comment: SOCIAL     Social History     Substance and Sexual Activity   Drug Use No    Comment: SUBSTANCE ABUSE IN THE PAST     Social History     Tobacco Use   Smoking Status Former Smoker   Smokeless Tobacco Never Used     Family History   Problem Relation Age of Onset    Diabetes Father     Hypertension Father     Diabetes Family     Hypertension Family     Skin cancer Family     COPD Mother     Breast cancer Maternal Grandmother 61    No Known Problems Sister     No Known Problems Daughter     No Known Problems Maternal Grandfather     No Known Problems Paternal Grandmother     No Known Problems Paternal Grandfather     No Known Problems Maternal Aunt     No Known Problems Maternal Aunt     No Known Problems Paternal Aunt        Meds/Allergies     (Not in a hospital admission)      Allergies   Allergen Reactions    Ace Inhibitors Cough    Compazine [Prochlorperazine] Other (See Comments)     Not specified    Phenothiazines Confusion    Quinolones     Sulfamethoxazole-Trimethoprim        Objective     /76   Pulse 64   Temp (!) 97 3 °F (36 3 °C) (Temporal)   Resp 18   Ht 5' 6" (1 676 m)   Wt 72 1 kg (159 lb)   SpO2 97%   BMI 25 66 kg/m²       PHYSICAL EXAM    Gen: NAD  CV: RRR  CHEST: Clear  ABD: soft, NT/ND  EXT: no edema      ASSESSMENT/PLAN:  This is a 49y o  year old female here for colonoscopy, and she is stable and optimized for her procedure

## 2021-09-07 ENCOUNTER — APPOINTMENT (OUTPATIENT)
Dept: LAB | Facility: MEDICAL CENTER | Age: 51
End: 2021-09-07
Payer: COMMERCIAL

## 2021-09-07 ENCOUNTER — CLINICAL SUPPORT (OUTPATIENT)
Dept: URGENT CARE | Facility: MEDICAL CENTER | Age: 51
End: 2021-09-07
Payer: COMMERCIAL

## 2021-09-07 ENCOUNTER — HOSPITAL ENCOUNTER (OUTPATIENT)
Dept: ULTRASOUND IMAGING | Facility: MEDICAL CENTER | Age: 51
Discharge: HOME | End: 2021-09-07
Payer: COMMERCIAL

## 2021-09-07 ENCOUNTER — APPOINTMENT (OUTPATIENT)
Dept: RADIOLOGY | Facility: MEDICAL CENTER | Age: 51
End: 2021-09-07
Payer: COMMERCIAL

## 2021-09-07 DIAGNOSIS — E04.1 THYROID NODULE: ICD-10-CM

## 2021-09-07 LAB
ALBUMIN SERPL BCP-MCNC: 3.6 G/DL (ref 3.5–5)
ALP SERPL-CCNC: 77 U/L (ref 46–116)
ALT SERPL W P-5'-P-CCNC: 32 U/L (ref 12–78)
ANION GAP SERPL CALCULATED.3IONS-SCNC: 0 MMOL/L (ref 4–13)
AST SERPL W P-5'-P-CCNC: 29 U/L (ref 5–45)
ATRIAL RATE: 61 BPM
ATRIAL RATE: 61 BPM
BASOPHILS # BLD AUTO: 0.04 THOUSANDS/ΜL (ref 0–0.1)
BASOPHILS NFR BLD AUTO: 1 % (ref 0–1)
BILIRUB SERPL-MCNC: 0.36 MG/DL (ref 0.2–1)
BUN SERPL-MCNC: 21 MG/DL (ref 5–25)
CALCIUM SERPL-MCNC: 8.6 MG/DL (ref 8.3–10.1)
CHLORIDE SERPL-SCNC: 106 MMOL/L (ref 100–108)
CO2 SERPL-SCNC: 32 MMOL/L (ref 21–32)
CREAT SERPL-MCNC: 0.82 MG/DL (ref 0.6–1.3)
EOSINOPHIL # BLD AUTO: 0.14 THOUSAND/ΜL (ref 0–0.61)
EOSINOPHIL NFR BLD AUTO: 2 % (ref 0–6)
ERYTHROCYTE [DISTWIDTH] IN BLOOD BY AUTOMATED COUNT: 14 % (ref 11.6–15.1)
GFR SERPL CREATININE-BSD FRML MDRD: 84 ML/MIN/1.73SQ M
GLUCOSE SERPL-MCNC: 91 MG/DL (ref 65–140)
HCT VFR BLD AUTO: 42.3 % (ref 34.8–46.1)
HGB BLD-MCNC: 13.8 G/DL (ref 11.5–15.4)
IMM GRANULOCYTES # BLD AUTO: 0.01 THOUSAND/UL (ref 0–0.2)
IMM GRANULOCYTES NFR BLD AUTO: 0 % (ref 0–2)
LYMPHOCYTES # BLD AUTO: 2.1 THOUSANDS/ΜL (ref 0.6–4.47)
LYMPHOCYTES NFR BLD AUTO: 35 % (ref 14–44)
MCH RBC QN AUTO: 27.7 PG (ref 26.8–34.3)
MCHC RBC AUTO-ENTMCNC: 32.6 G/DL (ref 31.4–37.4)
MCV RBC AUTO: 85 FL (ref 82–98)
MONOCYTES # BLD AUTO: 0.37 THOUSAND/ΜL (ref 0.17–1.22)
MONOCYTES NFR BLD AUTO: 6 % (ref 4–12)
NEUTROPHILS # BLD AUTO: 3.37 THOUSANDS/ΜL (ref 1.85–7.62)
NEUTS SEG NFR BLD AUTO: 56 % (ref 43–75)
NRBC BLD AUTO-RTO: 0 /100 WBCS
P AXIS: 61 DEGREES
P AXIS: 61 DEGREES
PLATELET # BLD AUTO: 246 THOUSANDS/UL (ref 149–390)
PMV BLD AUTO: 10.8 FL (ref 8.9–12.7)
POTASSIUM SERPL-SCNC: 4 MMOL/L (ref 3.5–5.3)
PR INTERVAL: 138 MS
PR INTERVAL: 138 MS
PROT SERPL-MCNC: 7.4 G/DL (ref 6.4–8.2)
QRS AXIS: 47 DEGREES
QRS AXIS: 47 DEGREES
QRSD INTERVAL: 90 MS
QRSD INTERVAL: 90 MS
QT INTERVAL: 410 MS
QT INTERVAL: 410 MS
QTC INTERVAL: 412 MS
QTC INTERVAL: 412 MS
RBC # BLD AUTO: 4.98 MILLION/UL (ref 3.81–5.12)
SODIUM SERPL-SCNC: 138 MMOL/L (ref 136–145)
T WAVE AXIS: 40 DEGREES
T WAVE AXIS: 40 DEGREES
VENTRICULAR RATE: 61 BPM
VENTRICULAR RATE: 61 BPM
WBC # BLD AUTO: 6.03 THOUSAND/UL (ref 4.31–10.16)

## 2021-09-07 PROCEDURE — 80053 COMPREHEN METABOLIC PANEL: CPT

## 2021-09-07 PROCEDURE — 93010 ELECTROCARDIOGRAM REPORT: CPT

## 2021-09-07 PROCEDURE — 93005 ELECTROCARDIOGRAM TRACING: CPT

## 2021-09-07 PROCEDURE — 71046 X-RAY EXAM CHEST 2 VIEWS: CPT

## 2021-09-07 PROCEDURE — 36415 COLL VENOUS BLD VENIPUNCTURE: CPT

## 2021-09-07 PROCEDURE — 76536 US EXAM OF HEAD AND NECK: CPT

## 2021-09-07 PROCEDURE — 85025 COMPLETE CBC W/AUTO DIFF WBC: CPT

## 2021-09-16 ENCOUNTER — OFFICE VISIT (OUTPATIENT)
Dept: FAMILY MEDICINE CLINIC | Facility: CLINIC | Age: 51
End: 2021-09-16
Payer: COMMERCIAL

## 2021-09-16 VITALS
TEMPERATURE: 97.8 F | WEIGHT: 163 LBS | SYSTOLIC BLOOD PRESSURE: 110 MMHG | HEIGHT: 66 IN | BODY MASS INDEX: 26.2 KG/M2 | DIASTOLIC BLOOD PRESSURE: 68 MMHG

## 2021-09-16 DIAGNOSIS — N30.90 CYSTITIS: Primary | ICD-10-CM

## 2021-09-16 LAB
SL AMB  POCT GLUCOSE, UA: NEGATIVE
SL AMB LEUKOCYTE ESTERASE,UA: ABNORMAL
SL AMB POCT BILIRUBIN,UA: NEGATIVE
SL AMB POCT BLOOD,UA: ABNORMAL
SL AMB POCT CLARITY,UA: CLEAR
SL AMB POCT COLOR,UA: YELLOW
SL AMB POCT KETONES,UA: 5
SL AMB POCT NITRITE,UA: NEGATIVE
SL AMB POCT PH,UA: 5
SL AMB POCT SPECIFIC GRAVITY,UA: 1
SL AMB POCT URINE PROTEIN: NEGATIVE
SL AMB POCT UROBILINOGEN: 0.2

## 2021-09-16 PROCEDURE — 36415 COLL VENOUS BLD VENIPUNCTURE: CPT | Performed by: FAMILY MEDICINE

## 2021-09-16 PROCEDURE — 81002 URINALYSIS NONAUTO W/O SCOPE: CPT | Performed by: FAMILY MEDICINE

## 2021-09-16 PROCEDURE — 99213 OFFICE O/P EST LOW 20 MIN: CPT | Performed by: FAMILY MEDICINE

## 2021-09-16 RX ORDER — AMOXICILLIN 500 MG/1
500 CAPSULE ORAL EVERY 12 HOURS SCHEDULED
Qty: 10 CAPSULE | Refills: 0 | Status: SHIPPED | OUTPATIENT
Start: 2021-09-16 | End: 2021-09-22 | Stop reason: HOSPADM

## 2021-09-16 NOTE — PROGRESS NOTES
Assessment/Plan:         Diagnoses and all orders for this visit:    Cystitis  -     amoxicillin (AMOXIL) 500 mg capsule; Take 1 capsule (500 mg total) by mouth every 12 (twelve) hours for 5 days  -     POCT urine dip  -     Urine culture          Subjective:   Chief Complaint   Patient presents with    Urinary Frequency      x 2-3 days           Patient ID: Tino Payton is a 49 y o  female  She is complaining of urinary frequency, dysuria, urgency, nocturia, cloudy urine, foul-smelling urine for 3 days  She has been sexually active, but she states that she has been urinating prior and after sexual activity  She does have a tendency to hold her urine through the day  The following portions of the patient's history were reviewed and updated as appropriate: allergies, current medications, past family history, past medical history, past social history, past surgical history and problem list     Review of Systems   Constitutional: Negative for chills and fever  HENT: Negative for ear pain and sore throat  Eyes: Negative for pain and visual disturbance  Respiratory: Negative for cough and shortness of breath  Cardiovascular: Negative for chest pain and palpitations  Gastrointestinal: Negative for abdominal pain and vomiting  Genitourinary: Positive for dysuria, frequency and urgency  Negative for hematuria and vaginal bleeding  Musculoskeletal: Negative for arthralgias and back pain  Skin: Negative for color change and rash  Neurological: Negative for seizures and syncope  Hematological: Negative  Psychiatric/Behavioral: Negative  All other systems reviewed and are negative  Objective:      /68   Temp 97 8 °F (36 6 °C)   Ht 5' 6" (1 676 m)   Wt 73 9 kg (163 lb)   BMI 26 31 kg/m²          Physical Exam  Constitutional:       General: She is not in acute distress  Appearance: She is well-developed and normal weight     HENT:      Head: Normocephalic and atraumatic  Right Ear: Tympanic membrane, ear canal and external ear normal       Left Ear: Tympanic membrane, ear canal and external ear normal       Nose: Nose normal    Eyes:      Conjunctiva/sclera: Conjunctivae normal       Pupils: Pupils are equal, round, and reactive to light  Neck:      Thyroid: No thyromegaly  Vascular: No JVD  Trachea: No tracheal deviation  Cardiovascular:      Rate and Rhythm: Normal rate and regular rhythm  Heart sounds: Normal heart sounds  No murmur heard  Pulmonary:      Effort: Pulmonary effort is normal       Breath sounds: Normal breath sounds  No wheezing or rales  Abdominal:      General: Bowel sounds are normal       Palpations: Abdomen is soft  There is no mass  Tenderness: There is no abdominal tenderness  There is no guarding or rebound  Musculoskeletal:         General: No tenderness  Normal range of motion  Cervical back: Normal range of motion and neck supple  Lymphadenopathy:      Cervical: No cervical adenopathy  Skin:     General: Skin is warm and dry  Findings: No lesion or rash  Neurological:      General: No focal deficit present  Mental Status: She is alert and oriented to person, place, and time  Cranial Nerves: No cranial nerve deficit  Deep Tendon Reflexes: Reflexes are normal and symmetric     Psychiatric:         Mood and Affect: Mood normal          Judgment: Judgment normal

## 2021-09-18 ENCOUNTER — NURSE TRIAGE (OUTPATIENT)
Dept: OTHER | Facility: OTHER | Age: 51
End: 2021-09-18

## 2021-09-18 NOTE — TELEPHONE ENCOUNTER
Reason for Disposition   [1] Taking antibiotic < 72 hours (3 days) for UTI AND [2] painful urination or frequency not improved    Answer Assessment - Initial Assessment Questions  1  ANTIBIOTIC: "What antibiotic are you taking?" "How many times per day?"      Amoxicillin 500 mg Q 12 hr for 5 days  2  DURATION: "When was the antibiotic started?"      Started taking on Thursday afternoon- currently 48 hrs into taking antibiotic  3  MAIN SYMPTOM: "What is the main symptom you are concerned about?"      Frequency has not improved  4  FEVER: "Do you have a fever?" If Yes, ask: "What is it, how was it measured, and when did it start?"      Denies fever  5  OTHER SYMPTOMS: "Do you have any other symptoms?" (e g , flank pain, vaginal discharge, blood in urine)      No other symptoms at this time      Protocols used: URINARY TRACT INFECTION ON ANTIBIOTIC FOLLOW-UP CALL [facility]

## 2021-09-18 NOTE — TELEPHONE ENCOUNTER
Regarding: Persistant UTI symptoms  ----- Message from Kati Domínguez sent at 9/18/2021  5:01 PM EDT -----  "Im on my third day of antibiotics and I still do not any relief "

## 2021-09-20 ENCOUNTER — ANESTHESIA EVENT (OUTPATIENT)
Dept: PERIOP | Facility: HOSPITAL | Age: 51
End: 2021-09-20
Payer: COMMERCIAL

## 2021-09-20 NOTE — TELEPHONE ENCOUNTER
Spoke with patient, she has been taking the azo and drinking cranberry juice  She said she did start feeling better last night

## 2021-09-21 ENCOUNTER — ANESTHESIA (OUTPATIENT)
Dept: PERIOP | Facility: HOSPITAL | Age: 51
End: 2021-09-21
Payer: COMMERCIAL

## 2021-09-21 ENCOUNTER — HOSPITAL ENCOUNTER (OUTPATIENT)
Facility: HOSPITAL | Age: 51
Setting detail: OUTPATIENT SURGERY
LOS: 1 days | Discharge: HOME | End: 2021-09-22
Attending: SURGERY | Admitting: SURGERY
Payer: COMMERCIAL

## 2021-09-21 DIAGNOSIS — E04.1 THYROID NODULE: ICD-10-CM

## 2021-09-21 LAB
CALCIUM SERPL-MCNC: 7.9 MG/DL (ref 8.3–10.1)
CALCIUM SERPL-MCNC: 8.6 MG/DL (ref 8.3–10.1)
PLATELET # BLD AUTO: 213 THOUSANDS/UL (ref 149–390)
PMV BLD AUTO: 10.1 FL (ref 8.9–12.7)

## 2021-09-21 PROCEDURE — 88342 IMHCHEM/IMCYTCHM 1ST ANTB: CPT | Performed by: PATHOLOGY

## 2021-09-21 PROCEDURE — 88341 IMHCHEM/IMCYTCHM EA ADD ANTB: CPT | Performed by: PATHOLOGY

## 2021-09-21 PROCEDURE — 88307 TISSUE EXAM BY PATHOLOGIST: CPT | Performed by: PATHOLOGY

## 2021-09-21 PROCEDURE — 85049 AUTOMATED PLATELET COUNT: CPT | Performed by: SURGERY

## 2021-09-21 PROCEDURE — 82310 ASSAY OF CALCIUM: CPT | Performed by: SURGERY

## 2021-09-21 PROCEDURE — 99024 POSTOP FOLLOW-UP VISIT: CPT | Performed by: SURGERY

## 2021-09-21 PROCEDURE — 60240 REMOVAL OF THYROID: CPT | Performed by: SURGERY

## 2021-09-21 RX ORDER — FENTANYL CITRATE/PF 50 MCG/ML
25 SYRINGE (ML) INJECTION
Status: DISCONTINUED | OUTPATIENT
Start: 2021-09-21 | End: 2021-09-21 | Stop reason: HOSPADM

## 2021-09-21 RX ORDER — LEVOTHYROXINE SODIUM 0.12 MG/1
125 TABLET ORAL DAILY
Status: DISCONTINUED | OUTPATIENT
Start: 2021-09-22 | End: 2021-09-22 | Stop reason: HOSPADM

## 2021-09-21 RX ORDER — SODIUM CHLORIDE, SODIUM LACTATE, POTASSIUM CHLORIDE, CALCIUM CHLORIDE 600; 310; 30; 20 MG/100ML; MG/100ML; MG/100ML; MG/100ML
100 INJECTION, SOLUTION INTRAVENOUS CONTINUOUS
Status: DISCONTINUED | OUTPATIENT
Start: 2021-09-21 | End: 2021-09-22

## 2021-09-21 RX ORDER — HEPARIN SODIUM 5000 [USP'U]/ML
5000 INJECTION, SOLUTION INTRAVENOUS; SUBCUTANEOUS EVERY 8 HOURS SCHEDULED
Status: DISCONTINUED | OUTPATIENT
Start: 2021-09-21 | End: 2021-09-22 | Stop reason: HOSPADM

## 2021-09-21 RX ORDER — NEOSTIGMINE METHYLSULFATE 1 MG/ML
INJECTION INTRAVENOUS AS NEEDED
Status: DISCONTINUED | OUTPATIENT
Start: 2021-09-21 | End: 2021-09-21

## 2021-09-21 RX ORDER — LIDOCAINE HYDROCHLORIDE 10 MG/ML
0.5 INJECTION, SOLUTION EPIDURAL; INFILTRATION; INTRACAUDAL; PERINEURAL ONCE AS NEEDED
Status: COMPLETED | OUTPATIENT
Start: 2021-09-21 | End: 2021-09-21

## 2021-09-21 RX ORDER — GLYCOPYRROLATE 0.2 MG/ML
INJECTION INTRAMUSCULAR; INTRAVENOUS AS NEEDED
Status: DISCONTINUED | OUTPATIENT
Start: 2021-09-21 | End: 2021-09-21

## 2021-09-21 RX ORDER — OXYCODONE HYDROCHLORIDE 10 MG/1
10 TABLET ORAL EVERY 4 HOURS PRN
Status: DISCONTINUED | OUTPATIENT
Start: 2021-09-21 | End: 2021-09-22 | Stop reason: HOSPADM

## 2021-09-21 RX ORDER — SODIUM CHLORIDE 9 MG/ML
INJECTION, SOLUTION INTRAVENOUS CONTINUOUS PRN
Status: DISCONTINUED | OUTPATIENT
Start: 2021-09-21 | End: 2021-09-21

## 2021-09-21 RX ORDER — ROCURONIUM BROMIDE 10 MG/ML
INJECTION, SOLUTION INTRAVENOUS AS NEEDED
Status: DISCONTINUED | OUTPATIENT
Start: 2021-09-21 | End: 2021-09-21

## 2021-09-21 RX ORDER — HYDROMORPHONE HCL/PF 1 MG/ML
0.5 SYRINGE (ML) INJECTION
Status: DISCONTINUED | OUTPATIENT
Start: 2021-09-21 | End: 2021-09-22 | Stop reason: HOSPADM

## 2021-09-21 RX ORDER — SODIUM CHLORIDE, SODIUM LACTATE, POTASSIUM CHLORIDE, CALCIUM CHLORIDE 600; 310; 30; 20 MG/100ML; MG/100ML; MG/100ML; MG/100ML
INJECTION, SOLUTION INTRAVENOUS CONTINUOUS PRN
Status: DISCONTINUED | OUTPATIENT
Start: 2021-09-21 | End: 2021-09-21

## 2021-09-21 RX ORDER — LIDOCAINE HYDROCHLORIDE 10 MG/ML
INJECTION, SOLUTION EPIDURAL; INFILTRATION; INTRACAUDAL; PERINEURAL AS NEEDED
Status: DISCONTINUED | OUTPATIENT
Start: 2021-09-21 | End: 2021-09-21

## 2021-09-21 RX ORDER — EPHEDRINE SULFATE 50 MG/ML
INJECTION INTRAVENOUS AS NEEDED
Status: DISCONTINUED | OUTPATIENT
Start: 2021-09-21 | End: 2021-09-21

## 2021-09-21 RX ORDER — MIDAZOLAM HYDROCHLORIDE 2 MG/2ML
INJECTION, SOLUTION INTRAMUSCULAR; INTRAVENOUS AS NEEDED
Status: DISCONTINUED | OUTPATIENT
Start: 2021-09-21 | End: 2021-09-21

## 2021-09-21 RX ORDER — FENTANYL CITRATE 50 UG/ML
INJECTION, SOLUTION INTRAMUSCULAR; INTRAVENOUS AS NEEDED
Status: DISCONTINUED | OUTPATIENT
Start: 2021-09-21 | End: 2021-09-21

## 2021-09-21 RX ORDER — ACETAMINOPHEN 325 MG/1
975 TABLET ORAL EVERY 6 HOURS SCHEDULED
Status: DISCONTINUED | OUTPATIENT
Start: 2021-09-21 | End: 2021-09-22 | Stop reason: HOSPADM

## 2021-09-21 RX ORDER — OXYCODONE HYDROCHLORIDE 5 MG/1
5 TABLET ORAL EVERY 4 HOURS PRN
Status: DISCONTINUED | OUTPATIENT
Start: 2021-09-21 | End: 2021-09-22 | Stop reason: HOSPADM

## 2021-09-21 RX ORDER — ONDANSETRON 2 MG/ML
INJECTION INTRAMUSCULAR; INTRAVENOUS AS NEEDED
Status: DISCONTINUED | OUTPATIENT
Start: 2021-09-21 | End: 2021-09-21

## 2021-09-21 RX ORDER — HYDROMORPHONE HCL IN WATER/PF 6 MG/30 ML
0.2 PATIENT CONTROLLED ANALGESIA SYRINGE INTRAVENOUS
Status: DISCONTINUED | OUTPATIENT
Start: 2021-09-21 | End: 2021-09-21 | Stop reason: HOSPADM

## 2021-09-21 RX ORDER — DEXAMETHASONE SODIUM PHOSPHATE 10 MG/ML
INJECTION, SOLUTION INTRAMUSCULAR; INTRAVENOUS AS NEEDED
Status: DISCONTINUED | OUTPATIENT
Start: 2021-09-21 | End: 2021-09-21

## 2021-09-21 RX ORDER — PROPOFOL 10 MG/ML
INJECTION, EMULSION INTRAVENOUS AS NEEDED
Status: DISCONTINUED | OUTPATIENT
Start: 2021-09-21 | End: 2021-09-21

## 2021-09-21 RX ORDER — SODIUM CHLORIDE, SODIUM LACTATE, POTASSIUM CHLORIDE, CALCIUM CHLORIDE 600; 310; 30; 20 MG/100ML; MG/100ML; MG/100ML; MG/100ML
125 INJECTION, SOLUTION INTRAVENOUS CONTINUOUS
Status: DISCONTINUED | OUTPATIENT
Start: 2021-09-21 | End: 2021-09-21

## 2021-09-21 RX ORDER — METOCLOPRAMIDE HYDROCHLORIDE 5 MG/ML
10 INJECTION INTRAMUSCULAR; INTRAVENOUS ONCE AS NEEDED
Status: DISCONTINUED | OUTPATIENT
Start: 2021-09-21 | End: 2021-09-21 | Stop reason: HOSPADM

## 2021-09-21 RX ADMIN — MIDAZOLAM 2 MG: 1 INJECTION INTRAMUSCULAR; INTRAVENOUS at 13:37

## 2021-09-21 RX ADMIN — REMIFENTANIL HYDROCHLORIDE 0.2 MCG/KG/MIN: 1 INJECTION, POWDER, LYOPHILIZED, FOR SOLUTION INTRAVENOUS at 13:53

## 2021-09-21 RX ADMIN — PROPOFOL 30 MG: 10 INJECTION, EMULSION INTRAVENOUS at 15:38

## 2021-09-21 RX ADMIN — SODIUM CHLORIDE, SODIUM LACTATE, POTASSIUM CHLORIDE, AND CALCIUM CHLORIDE: .6; .31; .03; .02 INJECTION, SOLUTION INTRAVENOUS at 15:48

## 2021-09-21 RX ADMIN — SODIUM CHLORIDE, SODIUM LACTATE, POTASSIUM CHLORIDE, AND CALCIUM CHLORIDE 125 ML/HR: .6; .31; .03; .02 INJECTION, SOLUTION INTRAVENOUS at 21:09

## 2021-09-21 RX ADMIN — ONDANSETRON 4 MG: 2 INJECTION INTRAMUSCULAR; INTRAVENOUS at 15:41

## 2021-09-21 RX ADMIN — LIDOCAINE HYDROCHLORIDE 0.5 ML: 10 INJECTION, SOLUTION EPIDURAL; INFILTRATION; INTRACAUDAL; PERINEURAL at 12:58

## 2021-09-21 RX ADMIN — Medication 25 MCG: at 17:00

## 2021-09-21 RX ADMIN — ACETAMINOPHEN 975 MG: 325 TABLET ORAL at 21:10

## 2021-09-21 RX ADMIN — FENTANYL CITRATE 50 MCG: 50 INJECTION INTRAMUSCULAR; INTRAVENOUS at 13:42

## 2021-09-21 RX ADMIN — SODIUM CHLORIDE: 0.9 INJECTION, SOLUTION INTRAVENOUS at 13:51

## 2021-09-21 RX ADMIN — DEXAMETHASONE SODIUM PHOSPHATE 10 MG: 10 INJECTION, SOLUTION INTRAMUSCULAR; INTRAVENOUS at 14:29

## 2021-09-21 RX ADMIN — SODIUM CHLORIDE, SODIUM LACTATE, POTASSIUM CHLORIDE, AND CALCIUM CHLORIDE: .6; .31; .03; .02 INJECTION, SOLUTION INTRAVENOUS at 13:34

## 2021-09-21 RX ADMIN — Medication 25 MCG: at 19:50

## 2021-09-21 RX ADMIN — HEPARIN SODIUM 5000 UNITS: 5000 INJECTION INTRAVENOUS; SUBCUTANEOUS at 21:10

## 2021-09-21 RX ADMIN — Medication 25 MCG: at 16:40

## 2021-09-21 RX ADMIN — GLYCOPYRROLATE 0.6 MG: 0.2 INJECTION, SOLUTION INTRAMUSCULAR; INTRAVENOUS at 16:02

## 2021-09-21 RX ADMIN — NEOSTIGMINE METHYLSULFATE 3 MG: 1 INJECTION INTRAVENOUS at 16:02

## 2021-09-21 RX ADMIN — SODIUM CHLORIDE, SODIUM LACTATE, POTASSIUM CHLORIDE, AND CALCIUM CHLORIDE 125 ML/HR: .6; .31; .03; .02 INJECTION, SOLUTION INTRAVENOUS at 12:58

## 2021-09-21 RX ADMIN — LIDOCAINE HYDROCHLORIDE 50 MG: 10 INJECTION, SOLUTION EPIDURAL; INFILTRATION; INTRACAUDAL; PERINEURAL at 13:42

## 2021-09-21 RX ADMIN — PROPOFOL 170 MG: 10 INJECTION, EMULSION INTRAVENOUS at 13:42

## 2021-09-21 RX ADMIN — ROCURONIUM BROMIDE 30 MG: 50 INJECTION, SOLUTION INTRAVENOUS at 13:42

## 2021-09-21 RX ADMIN — EPHEDRINE SULFATE 5 MG: 50 INJECTION, SOLUTION INTRAVENOUS at 13:58

## 2021-09-21 NOTE — OP NOTE
OPERATIVE REPORT  PATIENT NAME: Rodney Buckley    :  1970  MRN: [id number]  Pt Location: BE OR ROOM 04    SURGERY DATE: 2021    Surgeon(s) and Role:     * Jacinto Hoover MD - Primary     * Horacio Sanchez MD - Assisting    Preop Diagnosis:  Thyroid nodule [E04 1]    Post-Op Diagnosis Codes: * Thyroid nodule [E04 1]    Procedure(s) (LRB):  THYROIDECTOMY, total (Bilateral)    Specimen(s):  ID Type Source Tests Collected by Time Destination   1 : Thyroid Tissue Thyroid TISSUE EXAM Jacinto Hoover MD 2021 1522        Estimated Blood Loss:   50 mL    Drains:  * No LDAs found *    Anesthesia Type:   General    Operative Indications:  Thyroid nodule [E04 1]  right    Operative Findings:  Right thyroid/isthmus nodules    Complications:   None    Procedure and Technique:  The patient was brought into the operating room and identified by a proper timeout  Following this she was intubated by the anesthesia team  She was then positioned with a shoulder roll behind the scapula and the head in the sniffing position  The neck was then prepped and draped in the usual fashion  Following this, the skin at and around the planned cervical incision site was anesthetized with lidocaine  Next, a 5 cm incision was made 2 fingerbreadths above the sternal notch  Cautery was used to dissect through the dermis and subcutaneous fat  The platysma was transected with cautery  We then created flaps superiorly and inferiorly underneath the platysma  Gelpi retractors were then placed for exposure  We then proceeded to look for the strap muscles  We were able to visualize strap muscles and divided them to expose the thyroid  We first focused on the right thyroid lobe  The strap muscles were mobilized them off the anterolateral aspect of the thyroid lobe  Using traction on the superior pole we were able to take down the superior pole vessels with Harmonic Scalpel  We rotated the gland anteromedially   We saw what appeared to be the parathyroid glands which were visualized and preserved by means of close capsular dissection using bipolar device  One of these possible glands was barely attached to the thyroid and therefore we detached it completely and reimplanted it in the right strap muscle  We visualized recurrent laryngeal nerve as we rolled the small lobe forward  The ligament of berry was then clamped and tied off with a 2-0 vicryl suture  The lobe was then freed from anterior surface of the trachea with cautery  Valsalva was applied in the operative Field inspected for bleeding  None was seen  Surgicel was placed in the operative field  We then focused on the left thyroid lobe  The strap muscles were mobilized them off the anterolateral aspect of the thyroid lobe  Using traction on the superior pole we were able to take down the superior pole vessels with Harmonic Scalpel  We rotated the gland anteromedially  We saw what appeared to be the parathyroid glands which were visualized and preserved by means of close capsular dissection using bipolar device  We visualized recurrent laryngeal nerve as we rolled the small lobe forward  The ligament of berry was then clamped and tied off with a 2-0 vicryl suture  The lobe was then freed from anterior surface of the trachea with cautery and the gland was then sent to pathology for processing  Valsalva was applied in the operative Field inspected for bleeding  None was seen  Surgicel was placed in the operative field  Once we were sure of hemostasis, closure was performed using 3-0 Vicryl to close the strap muscles in the midline, followed by running 3-0 Vicryl to close the platysma, followed by a 4-0 vicryl to close the dermis in interrupted fashion, followed by 5-0 Monocryl placed in running subcuticular fashion to close the skin  Benzoin and steristrips were applied to the incision, followed by gauze and a blue towel   The patient tolerated the procedure well without any difficulties   I was present for the entire procedure    Patient Disposition:  PACU     SIGNATURE: Rosendo Romero MD  DATE: September 21, 2021  TIME: 4:48 PM

## 2021-09-21 NOTE — ANESTHESIA POSTPROCEDURE EVALUATION
Post-Op Assessment Note    CV Status:  Stable  Pain Score: 4    Pain management: adequate     Mental Status:  Awake   Hydration Status:  Stable   PONV Controlled:  None   Airway Patency:  Patent      Post Op Vitals Reviewed: Yes      Staff: Anesthesiologist         No complications documented      BP      Temp     Pulse     Resp      SpO2

## 2021-09-21 NOTE — H&P
Surgical Oncology Follow Up                                        [address] [facility] [address] [id number] [phone]     Tammy Pinedo  1970  [id number]  [address] [facility] [place]  [address] [facility] [place]  [address] [place] [id number] [phone]         Chief Complaint   Patient presents with    Consult         Assessment/Plan:     No problem-specific Assessment & Plan notes found for this encounter          Diagnoses and all orders for this visit:     Thyroid nodule  -     Ambulatory referral to Surgical Oncology  -     traMADol (ULTRAM) 50 mg tablet; Take 1 tablet (50 mg total) by mouth every 6 (six) hours as needed for moderate pain  -     Case request operating room: THYROIDECTOMY, total; Standing  -     CBC and differential; Future  -     Comprehensive metabolic panel; Future  -     EKG 12 lead; Future  -     XR chest pa & lateral; Future  -     Case request operating room: THYROIDECTOMY, total     Other orders  -     Incentive spirometry; Standing  -     Insert and maintain IV line; Standing  -     Void On-Call to O R ; Standing  -     Place sequential compression device; Standing         Advance Care Planning/Advance Directives:  Discussed disease status, cancer treatment plans and/or cancer treatment goals with the patient           Oncology History     No history exists          History of Present Illness: The patient is a 52-year-old woman here with thyroid nodules  She had 2 in the right upper biopsied which ultimately came back as benign  However, they are large and bulky  She is asymptomatic and has no difficulty breathing  She has been on Synthroid for the last 15 years  She denies a history of radiation exposure to the head or neck area  She has no personal or family history of endocrine malignancies      Review of Systems   Constitutional: Negative  HENT: Negative  Negative for sore throat, trouble swallowing and voice change  Eyes: Negative  Respiratory: Negative  Cardiovascular: Negative  Gastrointestinal: Negative  Endocrine: Negative  Genitourinary: Negative  Musculoskeletal: Negative  Skin: Negative  Allergic/Immunologic: Negative  Neurological: Negative  Hematological: Negative      Psychiatric/Behavioral: Negative              Patient Active Problem List   Diagnosis    Generalized anxiety disorder    Essential hypertension    Generalized osteoarthritis of multiple sites    Acquired hypothyroidism    Stress incontinence, female    Surgical menopause    Lumbar disc disease    Chronic right-sided low back pain without sciatica    Lumbar radiculopathy    Hypertriglyceridemia    Marijuana use, episodic    Status post bariatric surgery    Thyroid nodule      Medical History        Past Medical History:   Diagnosis Date    Abnormal Pap smear of cervix      Cervical dysplasia       LAST ASSESSED: [id number]    HPV (human papilloma virus) infection      Hypertension      Hypothyroid           Surgical History         Past Surgical History:   Procedure Laterality Date    BILATERAL SALPINGOOPHORECTOMY Bilateral 2005    CERVICAL BIOPSY  W/ LOOP ELECTRODE EXCISION        COLPOSCOPY        TOTAL ABDOMINAL HYSTERECTOMY         OCCURRED PRIOR TO 2002, REASON GIVEN WAS RECURRENT CERVICAL DYSPLASIA    TUBAL LIGATION   1993    US GUIDED THYROID BIOPSY   3/19/2021    US GUIDED THYROID BIOPSY   6/15/2021               Family History   Problem Relation Age of Onset    Diabetes Father      Hypertension Father      Diabetes Family      Hypertension Family      Skin cancer Family      COPD Mother      Breast cancer Maternal Grandmother 61    No Known Problems Sister      No Known Problems Daughter      No Known Problems Maternal Grandfather      No Known Problems Paternal Grandmother      No Known Problems Paternal Grandfather      No Known Problems Maternal Aunt      No Known Problems Maternal Aunt      No Known Problems Paternal Aunt        Social History               Socioeconomic History    Marital status: Single       Spouse name: Not on file    Number of children: Not on file    Years of education: Not on file    Highest education level: Not on file   Occupational History    Not on file   Tobacco Use    Smoking status: Former Smoker    Smokeless tobacco: Never Used   Vaping Use    Vaping Use: Never used   Substance and Sexual Activity    Alcohol use: Yes       Comment: SOCIAL    Drug use: No       Comment: SUBSTANCE ABUSE IN THE PAST    Sexual activity: Yes       Partners: Male       Birth control/protection: Female Sterilization   Other Topics Concern    Not on file   Social History Narrative     USES SAFETY EQUIPMENT - [employer]      Social Determinants of Health          Financial Resource Strain: Low Risk     Difficulty of Paying Living Expenses: Not hard at all   Food Insecurity: No Food Insecurity    Worried About Running Out of Food in the Last Year: Never true    Shiv of Food in the Last Year: Never true   Transportation Needs: No Transportation Needs    Lack of Transportation (Medical): No    Lack of Transportation (Non-Medical):  No   Physical Activity:     Days of Exercise per Week:     Minutes of Exercise per Session:    Stress:     Feeling of Stress :    Social Connections:     Frequency of Communication with Friends and Family:     Frequency of Social Gatherings with Friends and Family:     Attends Orthodoxy Services:     Active Member of Clubs or Organizations:     Attends Club or Organization Meetings:     Marital Status:    Intimate Partner Violence:     Fear of Current or Ex-Partner:     Emotionally Abused:     Physically Abused:     Sexually Abused:             Current Outpatient Medications:     levothyroxine 125 mcg tablet, Take 1 tablet (125 mcg total) by mouth daily, Disp: 90 tablet, Rfl: 1    conjugated estrogens (PREMARIN) vaginal cream, Use 3 times a week at night for the first week, 2 times a week the second week, and then weekly x 3 months (Patient not taking: Reported on 8/10/2021), Disp: 0 5 g, Rfl: 3    methocarbamol (ROBAXIN) 500 mg tablet, , Disp: , Rfl:     Sodium Sulfate-Mag Sulfate-KCl (Sutab) [id number] MG TABS, Please take tablets per instructions provided by GI clinic   (Patient not taking: Reported on 7/8/2021), Disp: 24 tablet, Rfl: 0    traMADol (ULTRAM) 50 mg tablet, Take 1 tablet (50 mg total) by mouth every 6 (six) hours as needed for moderate pain, Disp: 10 tablet, Rfl: 0        Allergies   Allergen Reactions    Ace Inhibitors Cough    Compazine [Prochlorperazine] Other (See Comments)       Not specified    Phenothiazines Confusion    Quinolones      Sulfamethoxazole-Trimethoprim            Vitals:   /69   Pulse 55   Temp 97 5 °F (36 4 °C) (Tympanic)   Resp 18   Ht 5' 6" (1 676 m)   Wt 73 9 kg (163 lb)   SpO2 100%   BMI 26 31 kg/m²        Physical Exam     Pathology:  Case Report   Non-gynecologic Cytology                          Case: [id number]                                   Authorizing Provider: Grace Hutton MD             Collected:           06/15/2021 0950               Ordering Location:     [facility]      Received:            06/15/2021 [address] [place] [address] Ultrasound                                                           Pathologist:           Marita Boo MD                                                                 Specimens:   A) - Thyroid, Right, Mid                                                                             B) - Thyroid, Right, Mid                                                                             C) - Thyroid, Right, Lower Pole                                                                      D) - Thyroid, Right, Lower Pole                                                             Final Diagnosis   A -B   Thyroid, right mid (fine needle aspiration):      - Benign ([place] Category II) - See note      - Adenomatoid nodule with cystic change       Satisfactory for evaluation      C  -D  Thyroid, right lower pole (fine needle aspiration):      - Atypia of undetermined significance ([place] Category III) - See note      - Focal nuclear atypia including elongation and irregularity       - Scant colloid       Satisfactory for evaluation      Note (Thyroid, Right Mid):   As reported in the [facility] for Reporting Thyroid Cytopathology*, the diagnostic category of "benign/negative for malignancy" carries a 0-3% risk of malignancy being found in subsequent resections (in the few studies of patients with benign FNA results that were followed long-term, a false negative rate of 0-3% was reported), with the usual management being clinical follow-up supplemented by ultrasonography (US) as indicated  Repeat FNA may be indicated for nodules showing significant growth or developing US abnormalities  Ultimately, clinical/imaging correlation for this patient is needed in arriving at the actual management plan  *The [place] System for Reporting Thyroid Cytopathology, Giulia Coats ), 2018 (2nd ed )      Note (Thyroid, Right Lower):  (1) As reported in the [facility] for Reporting Thyroid Cytopathology*, this diagnostic category has demonstrated anywhere from 10-30% risk of malignancy being found in subsequent resections and/or FNA   This risk of malignancy is expected to change due to the usage of the surgical pathology diagnosis of non-invasive follicular thyroid neoplasm with papillary-like nuclear features (NIFTP)    The anticipated risk of malignancy secondary to NIFTP is 6-18%  The manual reports that the usual management following this diagnosis is repeat FNA, molecular testing, or lobectomy   Ultimately, clinical/imaging correlation for this patient is needed in arriving at the actual management plan  *The [place] System for Reporting Thyroid Cytopathology, Horace Escamilla ), 2018 (2nd ed )    Electronically signed by Marcie Dyson MD on 6/17/2021 at 12:34 PM         Labs:        Lab Results   Component Value Date     [id number] 5 377 (H) 04/15/2019     TSH 1 38 06/14/2021     X8YGJJH 10 7 06/14/2021            Imaging  THYROID ULTRASOUND     INDICATION:    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      COMPARISON:  None     TECHNIQUE:   Ultrasound of the thyroid was performed with a high frequency linear transducer in transverse and sagittal planes including volumetric imaging sweeps as well as traditional still imaging technique      FINDINGS:  Thyroid parenchyma is diffusely heterogeneous in echotexture with focal nodule(s) as described below      Right lobe:  6 9 x 2 7 x 2 5 cm   22 18 mL  Left lobe:  4 6 x 1 6 x 1 5 cm   5 55 mL  Isthmus:  0 5 cm      Nodule #1   Image 9  RIGHT midgland nodule measuring 1 2 x 1 4 x 1 0 cm   No priors for comparison  COMPOSITION:  2 points, solid or almost completely solid   ECHOGENICITY:  1 point, hyperechoic or isoechoic     SHAPE:  3 points, taller-than-wide  MARGIN: 0 points, smooth     ECHOGENIC FOCI:  0 points, none or large comet-tail artifacts  TI-RADS Classification: TR 4 (4-6 points), FNA if > 1 5 cm  Follow if > 1cm      Nodule #2   Image 21  RIGHT midgland nodule measuring 2 9 x 2 2 x 3 1 cm   No priors for comparison  COMPOSITION:  2 points, solid or almost completely solid   ECHOGENICITY:  1 point, hyperechoic or isoechoic     SHAPE:  0 points, wider-than-tall     MARGIN: 0 points, smooth  ECHOGENIC FOCI:  0 points, none or large comet-tail artifacts  TI-RADS Classification: TR 3 (3 points), FNA if >2 5 cm   Follow if >1 5 cm      Nodule #3   Image 30    RIGHT lower pole nodule measuring 3 3 x 2 7 x 3 4 cm   COMPOSITION:  2 points, solid or almost completely solid   ECHOGENICITY:  1 point, hyperechoic or isoechoic     SHAPE:  0 points, wider-than-tall     MARGIN: 0 points, smooth  ECHOGENIC FOCI:  0 points, none or large comet-tail artifacts  TI-RADS Classification: TR 3 (3 points), FNA if >2 5 cm   Follow if >1 5 cm            IMPRESSION:     The following meet current ACR criteria for recommending ultrasound guided biopsy:         The 2 9 x 2 2 x 3 1 cm right mid gland nodule  (Image number 21) (CRITERIA: TR 3, Mildly suspicious  FNA if >2 5 cm       The 3 3 x 2 7 x 3 4 cm right lower gland nodule  (Image number 30) (CRITERIA: TR 3, Mildly suspicious  FNA if >2 5 cm       Right mid gland nodule for which one-year follow-up is recommended      Reference: ACR Thyroid Imaging, Reporting and Data System (TI-RADS): White Paper of the [place]  J AM Nora Radiol 4529;39:240-200  (additional recommendations based on American Thyroid Association 2015 guidelines )     The study was marked in [facility] for significant notification         Workstation performed: [id number]  I reviewed the above laboratory and imaging data      Discussion/Summary:  Goiter, right side, multiple nodules with biopsies being benign  She is already in a full dose of thyroid replacement  She is opting for total thyroidectomy to obviate needing continued surveillance of the thyroid on the left  If only right hemithyroidectomy more performed to address the present nodules  I think this is reasonable, particularly given that she is already on full replacement, and that she would like to avoid potential need for 2nd operation should malignancy be found on the right side  We therefore consented and scheduled total thyroidectomy  Risks and benefits of surgery including infection, bleeding, need for possible additional surgery, were discussed with her  She understands the plan wishes to proceed as outlined

## 2021-09-21 NOTE — ANESTHESIA PREPROCEDURE EVALUATION
Procedure:  THYROIDECTOMY, total (Bilateral Neck)    Relevant Problems   CARDIO   (+) Essential hypertension   (+) Hypertriglyceridemia      ENDO   (+) Acquired hypothyroidism      GI/HEPATIC   (+) Status post bariatric surgery      MUSCULOSKELETAL   (+) Chronic right-sided low back pain without sciatica   (+) Generalized osteoarthritis of multiple sites      NEURO/PSYCH   (+) Generalized anxiety disorder        Physical Exam    Airway    Mallampati score: I  TM Distance: >3 FB  Neck ROM: full     Dental       Cardiovascular  Rhythm: regular, Rate: normal,     Pulmonary  Breath sounds clear to auscultation,     Other Findings        Anesthesia Plan  ASA Score- 2     Anesthesia Type- general with ASA Monitors  Additional Monitors:   Airway Plan: ETT  Plan Factors-Exercise tolerance (METS): >4 METS  Chart reviewed  Existing labs reviewed  Patient summary reviewed  Patient is not a current smoker  Induction- intravenous  Postoperative Plan- Plan for postoperative opioid use  Informed Consent- Anesthetic plan and risks discussed with patient

## 2021-09-22 VITALS
HEART RATE: 59 BPM | RESPIRATION RATE: 12 BRPM | HEIGHT: 66 IN | SYSTOLIC BLOOD PRESSURE: 120 MMHG | OXYGEN SATURATION: 97 % | DIASTOLIC BLOOD PRESSURE: 63 MMHG | BODY MASS INDEX: 26.2 KG/M2 | WEIGHT: 163 LBS | TEMPERATURE: 97.8 F

## 2021-09-22 LAB — CALCIUM SERPL-MCNC: 8.9 MG/DL (ref 8.3–10.1)

## 2021-09-22 PROCEDURE — 99024 POSTOP FOLLOW-UP VISIT: CPT | Performed by: SURGERY

## 2021-09-22 PROCEDURE — 82310 ASSAY OF CALCIUM: CPT | Performed by: SURGERY

## 2021-09-22 PROCEDURE — NC001 PR NO CHARGE: Performed by: PHYSICIAN ASSISTANT

## 2021-09-22 RX ORDER — ACETAMINOPHEN 325 MG/1
975 TABLET ORAL EVERY 6 HOURS SCHEDULED
Refills: 0 | COMMUNITY
Start: 2021-09-22 | End: 2021-12-13

## 2021-09-22 RX ADMIN — HEPARIN SODIUM 5000 UNITS: 5000 INJECTION INTRAVENOUS; SUBCUTANEOUS at 05:15

## 2021-09-22 RX ADMIN — ACETAMINOPHEN 975 MG: 325 TABLET ORAL at 05:14

## 2021-09-22 RX ADMIN — LEVOTHYROXINE SODIUM 125 MCG: 125 TABLET ORAL at 05:15

## 2021-09-22 RX ADMIN — SODIUM CHLORIDE, SODIUM LACTATE, POTASSIUM CHLORIDE, AND CALCIUM CHLORIDE 100 ML/HR: .6; .31; .03; .02 INJECTION, SOLUTION INTRAVENOUS at 05:17

## 2021-09-22 NOTE — QUICK NOTE
Surgery  Post op check     62-year-old female with hypothyroidism and thyroid goiter, status post total thyroidectomy on 09/21  Doing well  Vital signs are stable  Incisions clean dry and intact  Postop calcium 8 6    Plan   Regular diet   Continue ivf  Continue Synthroid   DVT prophylaxis subQ heparin   Ambulate  Incentive spirometer     /79   Pulse 71   Temp 98 3 °F (36 8 °C) (Oral)   Resp 17   Ht 5' 6" (1 676 m)   Wt 73 9 kg (163 lb)   SpO2 98%   BMI 26 31 kg/m²     Physical Exam  General: NAD  HEENT: NC/AT  MMM  Cv: RRR  Lungs: normal effort  Ab: Soft, NT/ND  Ex: no CCE  Neuro: AAOx3    Scheduled Meds:  Current Facility-Administered Medications   Medication Dose Route Frequency Provider Last Rate    acetaminophen  975 mg Oral Q6H [address] Ravi Newell MD      heparin (porcine)  5,000 Units Subcutaneous [facility] Ravi Newell MD      HYDROmorphone  0 5 mg Intravenous Q3H PRN Ravi Newell MD      lactated ringers  125 mL/hr Intravenous Continuous Ravi Newell  mL/hr (09/21/21 1258)    lactated ringers  125 mL/hr Intravenous Continuous Ravi Newell  mL/hr (09/21/21 2109)   Pedro Cochran [START ON 9/22/2021] levothyroxine  125 mcg Oral Daily Ravi Newell MD      oxyCODONE  10 mg Oral Q4H PRN Ravi Newell MD      oxyCODONE  5 mg Oral Q4H PRN Ravi Newell MD       Continuous Infusions:lactated ringers, 125 mL/hr, Last Rate: 125 mL/hr (09/21/21 1258)  lactated ringers, 125 mL/hr, Last Rate: 125 mL/hr (09/21/21 2109)      PRN Meds:  HYDROmorphone    oxyCODONE    oxyCODONE

## 2021-09-22 NOTE — PROGRESS NOTES
Progress Note - Rachel Villagran 49 y o  female MRN: [id number]    Unit/Bed#: [facility] 319-01 Encounter: [id number]      Assessment:  56-year-old female with hypothyroidism and thyroid goiter, status post total thyroidectomy on 09/21  Vss  Afebrile  Neck incision is c/d/i  Plan:  Regular diet  F/u morning calcium  D/c ivf  Continue synthroid  Ambulate  Incentive spirometer  Dc home today    Subjective:   Feels great  No complaints  Denied fever, chills, chest pain, shortness of breath, nausea, vomiting, or abdominal pain this morning  Objective:     Vitals: Blood pressure 143/79, pulse 71, temperature 98 3 °F (36 8 °C), temperature source Oral, resp  rate 17, height 5' 6" (1 676 m), weight 73 9 kg (163 lb), SpO2 98 %  ,Body mass index is 26 31 kg/m²  Intake/Output Summary (Last 24 hours) at 9/22/2021 0553  Last data filed at 9/21/2021 1622  Gross per 24 hour   Intake 2800 ml   Output 50 ml   Net 2750 ml       Physical Exam  General: NAD  HEENT: NC/AT  MMM  Cv: RRR  Lungs: normal effort  Ab: Soft, NT/ND  Ex: no CCE  Neuro: AAOx3    Scheduled Meds:  Current Facility-Administered Medications   Medication Dose Route Frequency Provider Last Rate    acetaminophen  975 mg Oral Q6H Genoveva Lewis MD      heparin (porcine)  5,000 Units Subcutaneous [facility] Ritika Davila MD      HYDROmorphone  0 5 mg Intravenous Q3H PRN Ritika Davila MD      lactated ringers  100 mL/hr Intravenous Continuous Ritika Davila  mL/hr (09/22/21 0517)    levothyroxine  125 mcg Oral Daily Ritika Davila MD      oxyCODONE  10 mg Oral Q4H PRN Ritika Davila MD      oxyCODONE  5 mg Oral Q4H PRN Ritika Davila MD       Continuous Infusions:lactated ringers, 100 mL/hr, Last Rate: 100 mL/hr (09/22/21 0517)      PRN Meds:  HYDROmorphone    oxyCODONE    oxyCODONE      Invasive Devices     Peripheral Intravenous Line            Peripheral IV 09/21/21 Dorsal (posterior); Left Hand <1 day    Peripheral IV 09/21/21 Right Hand <1 day                Lab, Imaging and other studies: I have personally reviewed pertinent reports      VTE Pharmacologic Prophylaxis: Sequential compression device (Venodyne)   VTE Mechanical Prophylaxis: sequential compression device

## 2021-09-22 NOTE — DISCHARGE SUMMARY
Discharge Summary - Surgical Oncology   Ezequiel Hinton 49 y o  female MRN: [id number]  Unit/Bed#: [facility] 042-52 Encounter: [id number]    Admission Date: 9/21/2021     Admitting Diagnosis: Thyroid nodule [E04 1]    HPI:Madeleine is a 52-year-old woman who was found to have thyroid nodules  They are large and bulky  Patient does not have any difficulty swallowing or breathing  She has been on Synthroid for the last 15 years  Denies any history of radiation exposure to her head or neck  Fine-needle aspirations of the right thyroid showed Adenomatoiod nodule with cystic change [place] category 2, the right lower pole fine needle aspiration showed atypia [place] category 3  Patient is now being admitted for surgical intervention  Procedures Performed:  September 21st, 2021 total thyroidectomy - Dr Amelia Venegas:  Patient has done extremely well postoperatively  There is no perioral numbness or tingling, her postoperative calciums were 7 9 then 8 6 and now this morning 8 9  Patient is tolerating a regular diet  Her anterior neck in wound is clean and dry, Steri-Strips are in place  Patient is using her incentive spirometer  She continues on her Synthroid 125 mcg daily  Patient will be discharged home today she will be followed up in the [place] office with Dr Strange on October 5th, 2021 at 10:00 a m  if patient has any perioral numbness or tingling she is to take 2 times and call the office  Pathology pending    Complications:  None    Discharge Diagnosis:  Thyroid nodules    Discharge Date:  September 22nd, 2021    Condition at Discharge:  Good     Discharge instructions/Information to patient and family:   See after visit summary for information provided to patient and family  Provisions for Follow-Up Care:  See after visit summary for information related to follow-up care and any pertinent home health orders        Disposition:  Home    Planned Readmission:  No    Discharge Statement I spent 20 minutes discharging the patient  This time was spent on the day of discharge  I had direct contact with the patient on the day of discharge  Additional documentation is required if more than 30 minutes were spent on discharge  Discharge Medications:  See after visit summary for reconciled discharge medications provided to patient and family

## 2021-09-30 PROBLEM — C73: Status: ACTIVE | Noted: 2021-09-30

## 2021-10-06 ENCOUNTER — OFFICE VISIT (OUTPATIENT)
Dept: SURGICAL ONCOLOGY | Facility: CLINIC | Age: 51
End: 2021-10-06

## 2021-10-06 VITALS
RESPIRATION RATE: 16 BRPM | BODY MASS INDEX: 26.33 KG/M2 | TEMPERATURE: 96.1 F | HEIGHT: 66 IN | HEART RATE: 73 BPM | SYSTOLIC BLOOD PRESSURE: 124 MMHG | WEIGHT: 163.8 LBS | DIASTOLIC BLOOD PRESSURE: 80 MMHG | OXYGEN SATURATION: 99 %

## 2021-10-06 DIAGNOSIS — C73 MINIMALLY INVASIVE FOLLICULAR CARCINOMA OF THYROID (HCC): Primary | ICD-10-CM

## 2021-10-06 PROCEDURE — 99024 POSTOP FOLLOW-UP VISIT: CPT | Performed by: SURGERY

## 2021-10-14 ENCOUNTER — OFFICE VISIT (OUTPATIENT)
Dept: ENDOCRINOLOGY | Facility: CLINIC | Age: 51
End: 2021-10-14
Payer: COMMERCIAL

## 2021-10-14 VITALS
HEART RATE: 74 BPM | WEIGHT: 163.6 LBS | HEIGHT: 66 IN | DIASTOLIC BLOOD PRESSURE: 80 MMHG | BODY MASS INDEX: 26.29 KG/M2 | SYSTOLIC BLOOD PRESSURE: 118 MMHG

## 2021-10-14 DIAGNOSIS — E03.9 HYPOTHYROIDISM (ACQUIRED): Primary | ICD-10-CM

## 2021-10-14 DIAGNOSIS — C73 MINIMALLY INVASIVE FOLLICULAR CARCINOMA OF THYROID (HCC): ICD-10-CM

## 2021-10-14 PROCEDURE — 99214 OFFICE O/P EST MOD 30 MIN: CPT | Performed by: INTERNAL MEDICINE

## 2021-10-25 ENCOUNTER — OFFICE VISIT (OUTPATIENT)
Dept: FAMILY MEDICINE CLINIC | Facility: CLINIC | Age: 51
End: 2021-10-25
Payer: COMMERCIAL

## 2021-10-25 VITALS
DIASTOLIC BLOOD PRESSURE: 80 MMHG | TEMPERATURE: 98.1 F | WEIGHT: 163.6 LBS | BODY MASS INDEX: 26.29 KG/M2 | SYSTOLIC BLOOD PRESSURE: 118 MMHG | HEIGHT: 66 IN

## 2021-10-25 DIAGNOSIS — Z23 NEED FOR VACCINATION: ICD-10-CM

## 2021-10-25 DIAGNOSIS — C73 MINIMALLY INVASIVE FOLLICULAR CARCINOMA OF THYROID (HCC): ICD-10-CM

## 2021-10-25 DIAGNOSIS — K13.79 ORAL SOFT TISSUE COMPLAINT: ICD-10-CM

## 2021-10-25 DIAGNOSIS — E51.9 THIAMINE DEFICIENCY: Primary | ICD-10-CM

## 2021-10-25 DIAGNOSIS — R73.9 HYPERGLYCEMIA: ICD-10-CM

## 2021-10-25 DIAGNOSIS — I10 ESSENTIAL HYPERTENSION: ICD-10-CM

## 2021-10-25 DIAGNOSIS — Z98.84 STATUS POST BARIATRIC SURGERY: ICD-10-CM

## 2021-10-25 DIAGNOSIS — E78.1 HYPERTRIGLYCERIDEMIA: ICD-10-CM

## 2021-10-25 DIAGNOSIS — R82.81 PYURIA: ICD-10-CM

## 2021-10-25 PROBLEM — Z90.3 HISTORY OF PARTIAL GASTRECTOMY: Status: ACTIVE | Noted: 2021-06-07

## 2021-10-25 LAB
SL AMB  POCT GLUCOSE, UA: NEGATIVE
SL AMB LEUKOCYTE ESTERASE,UA: ABNORMAL
SL AMB POCT BILIRUBIN,UA: NEGATIVE
SL AMB POCT BLOOD,UA: ABNORMAL
SL AMB POCT CLARITY,UA: ABNORMAL
SL AMB POCT COLOR,UA: YELLOW
SL AMB POCT KETONES,UA: NEGATIVE
SL AMB POCT NITRITE,UA: NEGATIVE
SL AMB POCT PH,UA: 5
SL AMB POCT SPECIFIC GRAVITY,UA: 1
SL AMB POCT URINE PROTEIN: 1
SL AMB POCT UROBILINOGEN: 0.2

## 2021-10-25 PROCEDURE — 99214 OFFICE O/P EST MOD 30 MIN: CPT | Performed by: FAMILY MEDICINE

## 2021-10-25 PROCEDURE — 81002 URINALYSIS NONAUTO W/O SCOPE: CPT | Performed by: FAMILY MEDICINE

## 2021-10-25 PROCEDURE — 90471 IMMUNIZATION ADMIN: CPT | Performed by: FAMILY MEDICINE

## 2021-10-25 PROCEDURE — 90682 RIV4 VACC RECOMBINANT DNA IM: CPT | Performed by: FAMILY MEDICINE

## 2021-10-25 RX ORDER — NITROFURANTOIN 25; 75 MG/1; MG/1
100 CAPSULE ORAL 2 TIMES DAILY
Qty: 10 CAPSULE | Refills: 0 | Status: SHIPPED | OUTPATIENT
Start: 2021-10-25 | End: 2021-10-30

## 2021-11-02 ENCOUNTER — APPOINTMENT (OUTPATIENT)
Dept: LAB | Facility: CLINIC | Age: 51
End: 2021-11-02
Payer: COMMERCIAL

## 2021-11-02 DIAGNOSIS — Z98.84 STATUS POST BARIATRIC SURGERY: ICD-10-CM

## 2021-11-02 DIAGNOSIS — E03.9 ACQUIRED HYPOTHYROIDISM: ICD-10-CM

## 2021-11-02 DIAGNOSIS — E78.1 HYPERTRIGLYCERIDEMIA: ICD-10-CM

## 2021-11-02 DIAGNOSIS — R73.9 HYPERGLYCEMIA: ICD-10-CM

## 2021-11-02 DIAGNOSIS — E03.9 HYPOTHYROIDISM (ACQUIRED): ICD-10-CM

## 2021-11-02 DIAGNOSIS — E51.9 THIAMINE DEFICIENCY: ICD-10-CM

## 2021-11-02 DIAGNOSIS — I10 ESSENTIAL HYPERTENSION: ICD-10-CM

## 2021-11-02 LAB
25(OH)D3 SERPL-MCNC: 23.4 NG/ML (ref 30–100)
ALBUMIN SERPL BCP-MCNC: 3.8 G/DL (ref 3.5–5)
ALP SERPL-CCNC: 76 U/L (ref 46–116)
ALT SERPL W P-5'-P-CCNC: 24 U/L (ref 12–78)
ANION GAP SERPL CALCULATED.3IONS-SCNC: 1 MMOL/L (ref 4–13)
AST SERPL W P-5'-P-CCNC: 22 U/L (ref 5–45)
BASOPHILS # BLD AUTO: 0.03 THOUSANDS/ΜL (ref 0–0.1)
BASOPHILS NFR BLD AUTO: 1 % (ref 0–1)
BILIRUB SERPL-MCNC: 0.63 MG/DL (ref 0.2–1)
BUN SERPL-MCNC: 18 MG/DL (ref 5–25)
CALCIUM SERPL-MCNC: 9.9 MG/DL (ref 8.3–10.1)
CHLORIDE SERPL-SCNC: 109 MMOL/L (ref 100–108)
CHOLEST SERPL-MCNC: 198 MG/DL (ref 50–200)
CO2 SERPL-SCNC: 30 MMOL/L (ref 21–32)
CREAT SERPL-MCNC: 0.87 MG/DL (ref 0.6–1.3)
EOSINOPHIL # BLD AUTO: 0.15 THOUSAND/ΜL (ref 0–0.61)
EOSINOPHIL NFR BLD AUTO: 3 % (ref 0–6)
ERYTHROCYTE [DISTWIDTH] IN BLOOD BY AUTOMATED COUNT: 13.9 % (ref 11.6–15.1)
EST. AVERAGE GLUCOSE BLD GHB EST-MCNC: 103 MG/DL
FERRITIN SERPL-MCNC: 92 NG/ML (ref 8–388)
FOLATE SERPL-MCNC: >20 NG/ML (ref 3.1–17.5)
GFR SERPL CREATININE-BSD FRML MDRD: 78 ML/MIN/1.73SQ M
GLUCOSE P FAST SERPL-MCNC: 85 MG/DL (ref 65–99)
HBA1C MFR BLD: 5.2 %
HCT VFR BLD AUTO: 45.8 % (ref 34.8–46.1)
HDLC SERPL-MCNC: 84 MG/DL
HGB BLD-MCNC: 14.8 G/DL (ref 11.5–15.4)
IMM GRANULOCYTES # BLD AUTO: 0.02 THOUSAND/UL (ref 0–0.2)
IMM GRANULOCYTES NFR BLD AUTO: 0 % (ref 0–2)
IRON SERPL-MCNC: 112 UG/DL (ref 50–170)
LDLC SERPL CALC-MCNC: 98 MG/DL (ref 0–100)
LYMPHOCYTES # BLD AUTO: 1.82 THOUSANDS/ΜL (ref 0.6–4.47)
LYMPHOCYTES NFR BLD AUTO: 37 % (ref 14–44)
MCH RBC QN AUTO: 27.5 PG (ref 26.8–34.3)
MCHC RBC AUTO-ENTMCNC: 32.3 G/DL (ref 31.4–37.4)
MCV RBC AUTO: 85 FL (ref 82–98)
MONOCYTES # BLD AUTO: 0.36 THOUSAND/ΜL (ref 0.17–1.22)
MONOCYTES NFR BLD AUTO: 7 % (ref 4–12)
NEUTROPHILS # BLD AUTO: 2.48 THOUSANDS/ΜL (ref 1.85–7.62)
NEUTS SEG NFR BLD AUTO: 52 % (ref 43–75)
NONHDLC SERPL-MCNC: 114 MG/DL
NRBC BLD AUTO-RTO: 0 /100 WBCS
PLATELET # BLD AUTO: 276 THOUSANDS/UL (ref 149–390)
PMV BLD AUTO: 10.6 FL (ref 8.9–12.7)
POTASSIUM SERPL-SCNC: 4.4 MMOL/L (ref 3.5–5.3)
PROT SERPL-MCNC: 7.6 G/DL (ref 6.4–8.2)
RBC # BLD AUTO: 5.39 MILLION/UL (ref 3.81–5.12)
SODIUM SERPL-SCNC: 140 MMOL/L (ref 136–145)
T4 FREE SERPL-MCNC: 1.42 NG/DL (ref 0.76–1.46)
T4 SERPL-MCNC: 14.7 UG/DL (ref 4.7–13.3)
TRIGL SERPL-MCNC: 78 MG/DL
TSH SERPL DL<=0.05 MIU/L-ACNC: 0.42 UIU/ML (ref 0.36–3.74)
VIT B12 SERPL-MCNC: 900 PG/ML (ref 100–900)
WBC # BLD AUTO: 4.86 THOUSAND/UL (ref 4.31–10.16)

## 2021-11-02 PROCEDURE — 84425 ASSAY OF VITAMIN B-1: CPT

## 2021-11-02 PROCEDURE — 83036 HEMOGLOBIN GLYCOSYLATED A1C: CPT

## 2021-11-02 PROCEDURE — 84443 ASSAY THYROID STIM HORMONE: CPT

## 2021-11-02 PROCEDURE — 80061 LIPID PANEL: CPT

## 2021-11-02 PROCEDURE — 82746 ASSAY OF FOLIC ACID SERUM: CPT

## 2021-11-02 PROCEDURE — 82607 VITAMIN B-12: CPT

## 2021-11-02 PROCEDURE — 36415 COLL VENOUS BLD VENIPUNCTURE: CPT

## 2021-11-02 PROCEDURE — 80053 COMPREHEN METABOLIC PANEL: CPT

## 2021-11-02 PROCEDURE — 83540 ASSAY OF IRON: CPT

## 2021-11-02 PROCEDURE — 84436 ASSAY OF TOTAL THYROXINE: CPT

## 2021-11-02 PROCEDURE — 82728 ASSAY OF FERRITIN: CPT

## 2021-11-02 PROCEDURE — 82306 VITAMIN D 25 HYDROXY: CPT

## 2021-11-02 PROCEDURE — 85025 COMPLETE CBC W/AUTO DIFF WBC: CPT

## 2021-11-02 PROCEDURE — 84439 ASSAY OF FREE THYROXINE: CPT

## 2021-11-04 ENCOUNTER — TELEPHONE (OUTPATIENT)
Dept: ENDOCRINOLOGY | Facility: CLINIC | Age: 51
End: 2021-11-04

## 2021-11-04 DIAGNOSIS — E03.9 HYPOTHYROIDISM (ACQUIRED): Primary | ICD-10-CM

## 2021-11-08 LAB — VIT B1 BLD-SCNC: 150.4 NMOL/L (ref 66.5–200)

## 2021-12-03 ENCOUNTER — TELEMEDICINE (OUTPATIENT)
Dept: FAMILY MEDICINE CLINIC | Facility: CLINIC | Age: 51
End: 2021-12-03
Payer: COMMERCIAL

## 2021-12-03 DIAGNOSIS — B34.9 VIRAL INFECTION, UNSPECIFIED: Primary | ICD-10-CM

## 2021-12-03 PROCEDURE — 99213 OFFICE O/P EST LOW 20 MIN: CPT | Performed by: FAMILY MEDICINE

## 2021-12-03 RX ORDER — PREDNISONE 10 MG/1
TABLET ORAL
Qty: 15 TABLET | Refills: 0 | Status: SHIPPED | OUTPATIENT
Start: 2021-12-03 | End: 2022-06-13

## 2021-12-04 PROCEDURE — 0241U HB NFCT DS VIR RESP RNA 4 TRGT: CPT | Performed by: FAMILY MEDICINE

## 2021-12-06 ENCOUNTER — APPOINTMENT (OUTPATIENT)
Dept: RADIOLOGY | Facility: MEDICAL CENTER | Age: 51
End: 2021-12-06
Payer: COMMERCIAL

## 2021-12-06 DIAGNOSIS — B34.9 VIRAL INFECTION, UNSPECIFIED: ICD-10-CM

## 2021-12-06 PROCEDURE — 71046 X-RAY EXAM CHEST 2 VIEWS: CPT

## 2021-12-13 ENCOUNTER — OFFICE VISIT (OUTPATIENT)
Dept: FAMILY MEDICINE CLINIC | Facility: CLINIC | Age: 51
End: 2021-12-13
Payer: COMMERCIAL

## 2021-12-13 ENCOUNTER — APPOINTMENT (OUTPATIENT)
Dept: LAB | Facility: CLINIC | Age: 51
End: 2021-12-13
Payer: COMMERCIAL

## 2021-12-13 VITALS
SYSTOLIC BLOOD PRESSURE: 110 MMHG | BODY MASS INDEX: 26.65 KG/M2 | WEIGHT: 165.8 LBS | HEIGHT: 66 IN | DIASTOLIC BLOOD PRESSURE: 72 MMHG | TEMPERATURE: 97.9 F

## 2021-12-13 DIAGNOSIS — E78.1 HYPERTRIGLYCERIDEMIA: ICD-10-CM

## 2021-12-13 DIAGNOSIS — C73 MINIMALLY INVASIVE FOLLICULAR CARCINOMA OF THYROID (HCC): ICD-10-CM

## 2021-12-13 DIAGNOSIS — E03.9 ACQUIRED HYPOTHYROIDISM: ICD-10-CM

## 2021-12-13 DIAGNOSIS — E03.9 ACQUIRED HYPOTHYROIDISM: Primary | ICD-10-CM

## 2021-12-13 DIAGNOSIS — R73.9 HYPERGLYCEMIA: ICD-10-CM

## 2021-12-13 DIAGNOSIS — B34.9 VIRAL INFECTION, UNSPECIFIED: ICD-10-CM

## 2021-12-13 DIAGNOSIS — E03.9 HYPOTHYROIDISM (ACQUIRED): ICD-10-CM

## 2021-12-13 LAB — T4 FREE SERPL-MCNC: 1.35 NG/DL (ref 0.76–1.46)

## 2021-12-13 PROCEDURE — 84443 ASSAY THYROID STIM HORMONE: CPT

## 2021-12-13 PROCEDURE — 84439 ASSAY OF FREE THYROXINE: CPT

## 2021-12-13 PROCEDURE — 36415 COLL VENOUS BLD VENIPUNCTURE: CPT

## 2021-12-13 PROCEDURE — 99214 OFFICE O/P EST MOD 30 MIN: CPT | Performed by: FAMILY MEDICINE

## 2021-12-14 DIAGNOSIS — E03.9 ACQUIRED HYPOTHYROIDISM: Primary | ICD-10-CM

## 2021-12-14 LAB — TSH SERPL DL<=0.05 MIU/L-ACNC: 2.93 UIU/ML (ref 0.36–3.74)

## 2021-12-21 ENCOUNTER — TELEPHONE (OUTPATIENT)
Dept: ENDOCRINOLOGY | Facility: CLINIC | Age: 51
End: 2021-12-21

## 2021-12-21 DIAGNOSIS — E04.1 THYROID NODULE: Primary | ICD-10-CM

## 2021-12-21 DIAGNOSIS — E03.9 ACQUIRED HYPOTHYROIDISM: ICD-10-CM

## 2021-12-21 RX ORDER — LEVOTHYROXINE SODIUM 137 UG/1
137 TABLET ORAL DAILY
Qty: 30 TABLET | Refills: 2 | Status: SHIPPED | OUTPATIENT
Start: 2021-12-21 | End: 2022-03-21

## 2021-12-22 ENCOUNTER — TELEPHONE (OUTPATIENT)
Dept: FAMILY MEDICINE CLINIC | Facility: CLINIC | Age: 51
End: 2021-12-22

## 2021-12-28 PROCEDURE — U0003 INFECTIOUS AGENT DETECTION BY NUCLEIC ACID (DNA OR RNA); SEVERE ACUTE RESPIRATORY SYNDROME CORONAVIRUS 2 (SARS-COV-2) (CORONAVIRUS DISEASE [COVID-19]), AMPLIFIED PROBE TECHNIQUE, MAKING USE OF HIGH THROUGHPUT TECHNOLOGIES AS DESCRIBED BY CMS-2020-01-R: HCPCS | Performed by: FAMILY MEDICINE

## 2021-12-28 PROCEDURE — U0005 INFEC AGEN DETEC AMPLI PROBE: HCPCS | Performed by: FAMILY MEDICINE

## 2022-01-11 ENCOUNTER — TELEPHONE (OUTPATIENT)
Dept: OBGYN CLINIC | Facility: CLINIC | Age: 52
End: 2022-01-11

## 2022-02-28 ENCOUNTER — ANNUAL EXAM (OUTPATIENT)
Dept: OBGYN CLINIC | Facility: CLINIC | Age: 52
End: 2022-02-28

## 2022-02-28 VITALS
DIASTOLIC BLOOD PRESSURE: 74 MMHG | SYSTOLIC BLOOD PRESSURE: 125 MMHG | WEIGHT: 166 LBS | BODY MASS INDEX: 26.79 KG/M2 | HEART RATE: 91 BPM

## 2022-02-28 DIAGNOSIS — N93.0 POSTCOITAL BLEEDING: ICD-10-CM

## 2022-02-28 DIAGNOSIS — Z12.31 ENCOUNTER FOR SCREENING MAMMOGRAM FOR MALIGNANT NEOPLASM OF BREAST: ICD-10-CM

## 2022-02-28 DIAGNOSIS — Z01.419 ENCOUNTER FOR ANNUAL ROUTINE GYNECOLOGICAL EXAMINATION: Primary | ICD-10-CM

## 2022-02-28 PROCEDURE — 99396 PREV VISIT EST AGE 40-64: CPT | Performed by: NURSE PRACTITIONER

## 2022-02-28 NOTE — PROGRESS NOTES
Assessment/Plan     Diagnoses and all orders for this visit:    Encounter for annual routine gynecological examination    Encounter for screening mammogram for malignant neoplasm of breast  -     Mammo screening bilateral w 3d & cad; Future    Postcoital bleeding         Plan  Patient Instructions   Schedule mammogram after 4/19/2022  Return with OB/GYN resident when Dr Emily Chatman is here to follow up vaginal bleeding after intercourse  Call with needs   Annual GYN exam is due in 1year     Return in about 1 year (around 2/28/2023) for Annual GYN exam, follow up visit, vaginal bleeding with OB/GYN resident when Dr Emily Chatman is here  Pt verbalized understanding of all discussed  Subjective      Cathy Harman is a 49 y o  female who presents for annual exam  The patient has no complaints today  The patient is sexually active  tner x 1 year GYN screening history: last pap: approximate date 2/23/2021 and negative HPV and was normal and last mammogram: approximate date 4/19/2021 and was normal  The patient is not taking hormone replacement therapy    The patient participates in regular exercise: no  Discussed calcium and vitamin DThe patient reports that there is not domestic violence in her life  The patient is not having menopausal symptoms none  Pt states she has a partner > 1 year, prior to that she was not sexually active for many years, pt states she has had postcloital vaginal bleeding since has become sexually active  Pt states she tried premarin suppositories 3 x per week for a few weeks and then weekly but states the vaginal bleeding after intercourse continued so she stopped the Premarin  Explained a small area in her vagina was noted to bleed when examined, D/W with Dr Emily Chatman, pt is to return for exam when Dr Emily Chatman is available     Depression Screening Follow-up Plan: Patient's depression screening was negative with a PHQ-2 score of 0  Their PHQ-9 score was 0    Clinically patient does not have depression  No treatment is required  Menstrual History:  OB History        3    Para   2    Term   2            AB   1    Living   2       SAB        IAB   1    Ectopic        Multiple        Live Births   2           Obstetric Comments   MENOPAUSE            Menarche age: 13  No LMP recorded  Patient has had a hysterectomy  age 29       The following portions of the patient's history were reviewed and updated as appropriate: allergies, current medications, past family history, past medical history, past social history, past surgical history and problem list     Review of Systems  Pertinent items are noted in HPI       Objective      /74   Pulse 91   Wt 75 3 kg (166 lb)   BMI 26 79 kg/m²      General:   alert and oriented, in no acute distress, alert, appears stated age and cooperative   Heart: regular rate and rhythm, S1, S2 normal, no murmur, click, rub or gallop   Lungs: clear to auscultation bilaterally   Thyroid: Surgically absent   Abdomen: soft, non-tender, without masses or organomegaly   Vulva: normal   Vagina: normal mucosa, upper R area of vagina small amount of bl;ood noted   Cervix: surgically absent   Uterus: surgically absent   Adnexa: surgically absent   Breasts: NT, negative masses, discharge or dimpling         Lab Review  mammogram ordered

## 2022-02-28 NOTE — PATIENT INSTRUCTIONS
Schedule mammogram after 4/19/2022  Return with OB/GYN resident when Dr Luly Claire is here to follow up vaginal bleeding after intercourse  Call with needs   Annual GYN exam is due in 1year     COVID-19 Instructions    If you are having any of the following:  Cough   Shortness of breath   Fever  If traveled within past 2 weeks internationally or to high risk [place] states  Or been in contact with someone that has     Please call either:   Your PCP office  [phone], option [phone]    They will screen you over the phone and direct you to the nearest appropriate testing location    DO NOT go to your PCP or OB office without calling first

## 2022-03-17 ENCOUNTER — OFFICE VISIT (OUTPATIENT)
Dept: OBGYN CLINIC | Facility: CLINIC | Age: 52
End: 2022-03-17

## 2022-03-17 VITALS
BODY MASS INDEX: 26.79 KG/M2 | DIASTOLIC BLOOD PRESSURE: 76 MMHG | WEIGHT: 166 LBS | HEART RATE: 75 BPM | SYSTOLIC BLOOD PRESSURE: 118 MMHG

## 2022-03-17 DIAGNOSIS — N95.2 VAGINAL ATROPHY: Primary | ICD-10-CM

## 2022-03-17 PROCEDURE — 99213 OFFICE O/P EST LOW 20 MIN: CPT | Performed by: OBSTETRICS & GYNECOLOGY

## 2022-03-17 PROCEDURE — 3078F DIAST BP <80 MM HG: CPT | Performed by: OBSTETRICS & GYNECOLOGY

## 2022-03-17 PROCEDURE — 3074F SYST BP LT 130 MM HG: CPT | Performed by: OBSTETRICS & GYNECOLOGY

## 2022-03-17 RX ORDER — CONJUGATED ESTROGENS 0.62 MG/G
1 CREAM VAGINAL DAILY
Qty: 30 G | Refills: 2 | Status: SHIPPED | OUTPATIENT
Start: 2022-03-17

## 2022-03-17 NOTE — ASSESSMENT & PLAN NOTE
Evidence of vaginal atrophy - prescribed Premarin cream with instructions  Vaginal cuff intact without evidence of bleeding, infection, dysplasia, or scar formation  No evidence of acute vaginal bleeding or specific areas of concern  No vaginal or vulvar lacerations

## 2022-03-17 NOTE — PROGRESS NOTES
Subjective:     Brenna Vigil is a 47 y o   female who presents with 1 year history of postcoital bleeding  She previously was on estradiol vaginal tablets but used them only briefly with minimal improvement  She reports that the bleeding is only after intercourse and occasionally requires a pad  She denies any changes in bleeding with changes in sexual activity  She reports a history of abdominal pap smears with HPV, LEEPs, and subsequent hysterectomy due to cervical dysplasia  Therefore, she has been having vaginal pap smears  Her last 2 pap tests ( & ) were NILM with neg HPV  We were unable to locate records for her hysterectomy but we were able to locate records for her BSO  She denies any postmenopausal symptoms at this time  Patient Active Problem List   Diagnosis    Generalized osteoarthritis of multiple sites    Acquired hypothyroidism    Stress incontinence, female    Surgical menopause    Lumbar disc disease    Chronic right-sided low back pain without sciatica    Lumbar radiculopathy    Hypertriglyceridemia    Marijuana use, episodic    Status post bariatric surgery    Thyroid nodule    Minimally invasive follicular carcinoma of thyroid (HCC)    Thiamine deficiency    History of partial gastrectomy    Vaginal atrophy     Past Medical History:   Diagnosis Date    Abnormal Pap smear of cervix     Cervical dysplasia     LAST ASSESSED: [id number]    Disease of thyroid gland     Generalized anxiety disorder 2012    HPV (human papilloma virus) infection     Hypothyroid     Minimally invasive follicular carcinoma of thyroid ([facility] [address] ) 2021     Objective:    Vitals: Blood pressure 118/76, pulse 75, weight 75 3 kg (166 lb)  Body mass index is 26 79 kg/m²  Physical Exam  Vitals reviewed  Exam conducted with a chaperone present  Constitutional:       General: She is not in acute distress  Appearance: Normal appearance  She is well-developed   She is not ill-appearing, toxic-appearing or diaphoretic  Cardiovascular:      Rate and Rhythm: Normal rate  Pulmonary:      Effort: Pulmonary effort is normal    Abdominal:      Palpations: Abdomen is soft  Genitourinary:     General: Normal vulva  Exam position: Lithotomy position  Labia:         Right: No rash, tenderness, lesion or injury  Left: No rash, tenderness, lesion or injury  Vagina: No signs of injury and foreign body  No vaginal discharge (white physiologic discharge in vaginal apex), erythema, tenderness, bleeding, lesions or prolapsed vaginal walls  Skin:     General: Skin is warm and dry  Neurological:      Mental Status: She is alert and oriented to person, place, and time     Psychiatric:         Mood and Affect: Mood normal          Behavior: Behavior normal          Assessment/Plan:    Problem List Items Addressed This Visit        Unprioritized    Vaginal atrophy - Primary     Evidence of vaginal atrophy - prescribed Premarin cream with instructions  Vaginal cuff intact without evidence of bleeding, infection, dysplasia, or scar formation  No evidence of acute vaginal bleeding or specific areas of concern  No vaginal or vulvar lacerations          Relevant Medications    conjugated estrogens (Premarin) vaginal cream        Kindra Soto MD  3/17/2022  8:30 AM

## 2022-03-21 DIAGNOSIS — E04.1 THYROID NODULE: ICD-10-CM

## 2022-03-21 DIAGNOSIS — E03.9 ACQUIRED HYPOTHYROIDISM: ICD-10-CM

## 2022-03-21 RX ORDER — LEVOTHYROXINE SODIUM 137 UG/1
TABLET ORAL
Qty: 30 TABLET | Refills: 0 | Status: SHIPPED | OUTPATIENT
Start: 2022-03-21 | End: 2022-04-14

## 2022-03-29 ENCOUNTER — APPOINTMENT (OUTPATIENT)
Dept: LAB | Facility: CLINIC | Age: 52
End: 2022-03-29
Payer: COMMERCIAL

## 2022-03-29 DIAGNOSIS — C73 MINIMALLY INVASIVE FOLLICULAR CARCINOMA OF THYROID (HCC): ICD-10-CM

## 2022-03-29 DIAGNOSIS — E04.1 THYROID NODULE: ICD-10-CM

## 2022-03-29 DIAGNOSIS — E03.9 ACQUIRED HYPOTHYROIDISM: ICD-10-CM

## 2022-03-29 LAB
T4 FREE SERPL-MCNC: 1.47 NG/DL (ref 0.76–1.46)
T4 SERPL-MCNC: 12.5 UG/DL (ref 4.7–13.3)
TSH SERPL DL<=0.05 MIU/L-ACNC: 0.17 UIU/ML (ref 0.36–3.74)

## 2022-03-29 PROCEDURE — 86800 THYROGLOBULIN ANTIBODY: CPT

## 2022-03-29 PROCEDURE — 84439 ASSAY OF FREE THYROXINE: CPT

## 2022-03-29 PROCEDURE — 84436 ASSAY OF TOTAL THYROXINE: CPT

## 2022-03-29 PROCEDURE — 36415 COLL VENOUS BLD VENIPUNCTURE: CPT

## 2022-03-29 PROCEDURE — 84479 ASSAY OF THYROID (T3 OR T4): CPT

## 2022-03-29 PROCEDURE — 84443 ASSAY THYROID STIM HORMONE: CPT

## 2022-03-29 PROCEDURE — 84432 ASSAY OF THYROGLOBULIN: CPT

## 2022-03-30 LAB — T3RU NFR SERPL: 28 % (ref 24–39)

## 2022-03-31 LAB — SCAN RESULT: NORMAL

## 2022-04-11 ENCOUNTER — HOSPITAL ENCOUNTER (OUTPATIENT)
Dept: ULTRASOUND IMAGING | Facility: HOSPITAL | Age: 52
Discharge: HOME | End: 2022-04-11
Attending: SURGERY
Payer: COMMERCIAL

## 2022-04-11 DIAGNOSIS — C73 MINIMALLY INVASIVE FOLLICULAR CARCINOMA OF THYROID (HCC): ICD-10-CM

## 2022-04-11 PROCEDURE — 76536 US EXAM OF HEAD AND NECK: CPT

## 2022-04-14 ENCOUNTER — OFFICE VISIT (OUTPATIENT)
Dept: ENDOCRINOLOGY | Facility: CLINIC | Age: 52
End: 2022-04-14
Payer: COMMERCIAL

## 2022-04-14 VITALS
BODY MASS INDEX: 26.93 KG/M2 | WEIGHT: 167.6 LBS | HEART RATE: 60 BPM | HEIGHT: 66 IN | DIASTOLIC BLOOD PRESSURE: 80 MMHG | SYSTOLIC BLOOD PRESSURE: 122 MMHG

## 2022-04-14 DIAGNOSIS — E03.9 ACQUIRED HYPOTHYROIDISM: Primary | ICD-10-CM

## 2022-04-14 PROBLEM — Z85.850 ENCOUNTER FOR FOLLOW-UP SURVEILLANCE OF THYROID CANCER: Status: ACTIVE | Noted: 2022-04-14

## 2022-04-14 PROBLEM — Z08 ENCOUNTER FOR FOLLOW-UP SURVEILLANCE OF THYROID CANCER: Status: ACTIVE | Noted: 2022-04-14

## 2022-04-14 PROBLEM — Z85.850 HX OF THYROID CANCER: Status: ACTIVE | Noted: 2021-09-30

## 2022-04-14 PROCEDURE — 99214 OFFICE O/P EST MOD 30 MIN: CPT | Performed by: INTERNAL MEDICINE

## 2022-04-14 RX ORDER — LEVOTHYROXINE SODIUM 0.12 MG/1
125 TABLET ORAL DAILY
Qty: 30 TABLET | Refills: 2 | Status: SHIPPED | OUTPATIENT
Start: 2022-04-14 | End: 2022-07-13

## 2022-04-14 NOTE — PROGRESS NOTES
Neli Caraballo 47 y o  female MRN: [id number]    Encounter: [id number]      Assessment/Plan     Thyroid Follicular carcinoma:  Lower right lobe, 4 5 cm lesion, grossly encapsulated, minimally invasive, confined to thyroid  pT3 NX  Two foci of papillary microcarcinoma, left lobe, confined to thyroid  Status post total thyroidectomy  With low risk for recurrence  Most recent thyroglobulin antibody negative and undetectable thyroglobulin  Neck ultrasound with lymph node mapping is in process  As patient is at low risk for recurrence is not a candidate for radioactive iodine treatment   Will monitor would post thyroidectomy surveillance with thyroglobulin/thyroglobulin antibody and ultrasound  TSH goal, 0 5-2  On levothyroxine 137 mcg once daily, with TSH below goal were which we decreased levothyroxine to 125 mcg once daily will repeat TFT in 6 weeks  Postoperative hypothyroidism: On levothyroxine 137 mcg once daily  TSH 0 166 free T4 of 1 47  Decreased levothyroxine to 125 mcg once daily  Check TFT in 6 weeks  CC:   Follow up for thyroid cancer     History of Present Illness      HPI:    Link Ruffin  Who is a 29-year-old female with right-sided minimally invasive follicular carcinoma in left-sided papillary microcarcinoma and left-sided papillary microcarcinoma status post total thyroidectomy   patient was evaluated for thyroid nodule which was discovered in which was done neck pain and showed AC metric enlarged right lobe of thyroid, followed with thyroid ultrasound showed 3 thyroid nodules, FNA was done and 2 of her thyroid nodules which is reported as atypia of undetermined significance([place] category III)  repeat FNA with Afirma which was done and showed right mid thyroid nodule [place] II and right lower nodule with atypia of undetermined significance([place] category III), for which Afirma test was done and showed benign result which malignancy risk of 4%     She undergone total thyroidectomy and pathology showed Right lobe, 4 5 cm lesion:  Grossly encapsulated, minimally invasive, follicular carcinoma; confined to thyroid  Left lobe:  Two foci of papillary microcarcinoma (4 mm and 3 mm, respectively) both confined to thyroid  she is currently on levothyroxine 137 mcg once daily, she is taking it regularly and properly  Her most recent TSH was 0 166 free T4 of 1 47  Patient denies, palpitation, tremors, excessive sweating, she has history of hysterectomy at age 29 history gastric bypass surgery 2 years ago  Recent labs showed undetectable thyroglobulin and negative  Thyroglobulin antibodies  Neck ultrasound with lymph node mapping was done which is in process  Review of Systems   Constitutional: Negative for activity change, appetite change, chills, diaphoresis, fatigue, fever and unexpected weight change  HENT: Negative for congestion, drooling, ear discharge, ear pain, trouble swallowing and voice change  Eyes: Negative for photophobia, pain, discharge, redness, itching and visual disturbance  Respiratory: Negative for cough, chest tightness, shortness of breath and wheezing  Cardiovascular: Negative for chest pain, palpitations and leg swelling  Gastrointestinal: Negative for abdominal distention, abdominal pain, blood in stool, diarrhea, nausea and vomiting  Endocrine: Negative for cold intolerance, heat intolerance, polydipsia, polyphagia and polyuria  Genitourinary: Negative for dysuria, flank pain, frequency and hematuria  Musculoskeletal: Negative for back pain, gait problem, joint swelling, myalgias, neck pain and neck stiffness  Skin: Negative for color change, pallor, rash and wound  Neurological: Negative for dizziness, tremors, seizures, syncope, speech difficulty, weakness, numbness and headaches  Psychiatric/Behavioral: Negative for agitation  All other systems reviewed and are negative        Historical Information   Past Medical History:   Diagnosis Date    Abnormal Pap smear of cervix     Cervical dysplasia     LAST ASSESSED: [id number]    Disease of thyroid gland     Generalized anxiety disorder 2012    HPV (human papilloma virus) infection     Hypothyroid     Minimally invasive follicular carcinoma of thyroid ([address] ) 2021     Past Surgical History:   Procedure Laterality Date    BILATERAL SALPINGOOPHORECTOMY Bilateral     CERVICAL BIOPSY  W/ LOOP ELECTRODE EXCISION      COLPOSCOPY      GASTRECTOMY SLEEVE LAPAROSCOPIC  2020    THYROIDECTOMY Bilateral 2021    Procedure: THYROIDECTOMY, total;  Surgeon: Abdiel Sorensen MD;  Location: [facility];  Service: Surgical Oncology    TOTAL ABDOMINAL HYSTERECTOMY      OCCURRED PRIOR TO , REASON GIVEN WAS RECURRENT CERVICAL DYSPLASIA    TUBAL LIGATION      US GUIDED THYROID BIOPSY  3/19/2021    US GUIDED THYROID BIOPSY  6/15/2021     Social History   Social History     Substance and Sexual Activity   Alcohol Use Yes    Alcohol/week: 3 0 standard drinks    Types: 3 Standard drinks or equivalent per week    Comment: SOCIAL     Social History     Substance and Sexual Activity   Drug Use No    Comment: SUBSTANCE ABUSE IN THE PAST     Social History     Tobacco Use   Smoking Status Former Smoker    Types: Cigarettes    Quit date: 200    Years since quittin 3   Smokeless Tobacco Never Used     Family History:   Family History   Problem Relation Age of Onset    Diabetes Father     Hypertension Father     Diabetes Family     Hypertension Family     Skin cancer Family     COPD Mother     Breast cancer Maternal Grandmother 61    No Known Problems Sister     No Known Problems Daughter     No Known Problems Maternal Grandfather     No Known Problems Paternal Grandmother     No Known Problems Paternal Grandfather     No Known Problems Maternal Aunt     No Known Problems Maternal Aunt     No Known Problems Paternal Aunt        Meds/Allergies Current Outpatient Medications   Medication Sig Dispense Refill    conjugated estrogens (Premarin) vaginal cream Insert 1 g into the vagina daily 30 g 2    levothyroxine 137 mcg tablet Take 1 tablet by mouth once daily 30 tablet 0    methocarbamol (ROBAXIN) 500 mg tablet        predniSONE 10 mg tablet 5 x 1 day, 4 x1 day, 3 x 1 day,2 x1 day,  1 x 1 day (Patient not taking: Reported on 12/13/2021 ) 15 tablet 0     No current facility-administered medications for this visit  Allergies   Allergen Reactions    Ace Inhibitors Cough    Compazine [Prochlorperazine] Other (See Comments)     "makes me crazy"    Phenothiazines Confusion    Quinolones Other (See Comments)     Patient does not remember reaction    Sulfamethoxazole-Trimethoprim Blisters     In mouth       Objective   Vitals: Blood pressure 122/80, pulse 60, height 5' 6" (1 676 m), weight 76 kg (167 lb 9 6 oz)  Physical Exam  Constitutional:       Appearance: She is well-developed  HENT:      Head: Normocephalic and atraumatic  Nose: Nose normal    Eyes:      Pupils: Pupils are equal, round, and reactive to light  Neck:      Thyroid: No thyromegaly  Vascular: No JVD  Comments: Well-healed thyroidectomy scar  Cardiovascular:      Rate and Rhythm: Normal rate and regular rhythm  Heart sounds: Normal heart sounds  No murmur heard  No friction rub  No gallop  Pulmonary:      Effort: Pulmonary effort is normal  No respiratory distress  Breath sounds: Normal breath sounds  No stridor  No wheezing or rales  Chest:      Chest wall: No tenderness  Abdominal:      General: Bowel sounds are normal  There is no distension  Palpations: Abdomen is soft  There is no mass  Tenderness: There is no abdominal tenderness  There is no guarding  Musculoskeletal:         General: No deformity  Normal range of motion  Cervical back: Normal range of motion  Skin:     General: Skin is warm     Neurological: Mental Status: She is alert and oriented to person, place, and time  The history was obtained from the review of the chart, patient  Lab Results:   Lab Results   Component Value Date/Time    TSH 3RD GENERATON 0 166 (L) 03/29/2022 10:10 AM    TSH 3RD GENERATON 2 930 12/13/2021 11:22 AM    TSH 3RD GENERATON 0 416 11/02/2021 12:11 PM    Free T4 1 47 (H) 03/29/2022 10:10 AM    Free T4 1 35 12/13/2021 11:22 AM    Free T4 1 42 11/02/2021 12:11 PM     Imaging Studies:  Results for orders placed during the hospital encounter of 09/07/21    US head neck lymph node mapping    Impression  1  Borderline prominent right level 2 lymph node but with otherwise normal morphology  Elongated lymph node on the left at level 4 with minimal fatty hilum  Findings are nonspecific  Otherwise no suspicious lymphadenopathy  Workstation performed: [id number]         I have personally reviewed pertinent reports  Portions of the record may have been created with voice recognition software  Occasional wrong word or "sound a like" substitutions may have occurred due to the inherent limitations of voice recognition software  Read the chart carefully and recognize, using context, where substitutions have occurred

## 2022-04-15 ENCOUNTER — TELEPHONE (OUTPATIENT)
Dept: HEMATOLOGY ONCOLOGY | Facility: CLINIC | Age: 52
End: 2022-04-15

## 2022-04-15 NOTE — TELEPHONE ENCOUNTER
Eli Dexter from [address] [place] [address] called in significant findings  Significant finding found on Ultrasound on head and neck from 4/11/22  [facility]

## 2022-04-18 ENCOUNTER — OFFICE VISIT (OUTPATIENT)
Dept: SURGICAL ONCOLOGY | Facility: CLINIC | Age: 52
End: 2022-04-18
Payer: COMMERCIAL

## 2022-04-18 VITALS
TEMPERATURE: 98.5 F | HEART RATE: 72 BPM | RESPIRATION RATE: 16 BRPM | SYSTOLIC BLOOD PRESSURE: 120 MMHG | DIASTOLIC BLOOD PRESSURE: 78 MMHG | HEIGHT: 66 IN | OXYGEN SATURATION: 98 % | BODY MASS INDEX: 26.68 KG/M2 | WEIGHT: 166 LBS

## 2022-04-18 DIAGNOSIS — Z85.850 ENCOUNTER FOR FOLLOW-UP SURVEILLANCE OF THYROID CANCER: Primary | ICD-10-CM

## 2022-04-18 DIAGNOSIS — Z08 ENCOUNTER FOR FOLLOW-UP SURVEILLANCE OF THYROID CANCER: Primary | ICD-10-CM

## 2022-04-18 DIAGNOSIS — Z85.850 HX OF THYROID CANCER: ICD-10-CM

## 2022-04-18 PROCEDURE — 99214 OFFICE O/P EST MOD 30 MIN: CPT | Performed by: SURGERY

## 2022-04-18 NOTE — PROGRESS NOTES
Surgical Oncology Follow Up       [address] [facility] [place]  Lorena Cho [address]  [facility] [id number] [address]  1970  [id number]  [address] [facility] [address] [address] [phone]    Chief Complaint   Patient presents with    Follow-up       Assessment/Plan:    No problem-specific Assessment & Plan notes found for this encounter  Diagnoses and all orders for this visit:    Encounter for follow-up surveillance of thyroid cancer  -     US guided thyroid biopsy; Future  -     Thyroglobulin,FNA/Body fluid; Future    Hx of thyroid cancer        Advance Care Planning/Advance Directives:  Discussed disease status, cancer treatment plans and/or cancer treatment goals with the patient  Oncology History   Hx of thyroid cancer   9/21/2021 Surgery    Total thyroidectomy:  - Right lobe, 4 5 cm lesion:  Grossly encapsulated, minimally invasive, follicular carcinoma; confined to thyroid  - Left lobe: Two foci of papillary microcarcinoma (4 mm and 3 mm, respectively) both confined        to thyroid  History of Present Illness:  Patient is a 60-year-old woman status post total thyroidectomy for turned to 4 5 cm right-sided thyroid cancer, follicular, along with 2 foci of microcarcinoma on the left  -Interval History:  She comes in for surveillance with blood work and scan done in anticipation of today's visit  She otherwise feels well and has no major complaints to report  She recent dose adjustment to her thyroid medication but otherwise is doing well  Review of Systems:  Review of Systems   Constitutional: Negative  HENT: Negative  Eyes: Negative  Respiratory: Negative  Cardiovascular: Negative  Gastrointestinal: Negative  Endocrine: Negative  Genitourinary: Negative  Musculoskeletal: Negative      Skin: Negative  Allergic/Immunologic: Negative  Neurological: Negative  Hematological: Negative  Psychiatric/Behavioral: Negative  All other systems reviewed and are negative        Patient Active Problem List   Diagnosis    Generalized osteoarthritis of multiple sites    Acquired hypothyroidism    Stress incontinence, female    Surgical menopause    Lumbar disc disease    Chronic right-sided low back pain without sciatica    Lumbar radiculopathy    Hypertriglyceridemia    Marijuana use, episodic    Status post bariatric surgery    Thyroid nodule    Hx of thyroid cancer    Thiamine deficiency    History of partial gastrectomy    Vaginal atrophy    Encounter for follow-up surveillance of thyroid cancer     Past Medical History:   Diagnosis Date    Abnormal Pap smear of cervix     Cervical dysplasia     LAST ASSESSED: [id number]    Disease of thyroid gland     Generalized anxiety disorder 2/21/2012    HPV (human papilloma virus) infection     Hypothyroid     Minimally invasive follicular carcinoma of thyroid ([facility] [address] ) 9/30/2021     Past Surgical History:   Procedure Laterality Date    BILATERAL SALPINGOOPHORECTOMY Bilateral 2005    CERVICAL BIOPSY  W/ LOOP ELECTRODE EXCISION      COLPOSCOPY      GASTRECTOMY SLEEVE LAPAROSCOPIC  2020    THYROIDECTOMY Bilateral 9/21/2021    Procedure: THYROIDECTOMY, total;  Surgeon: Francisco Dowling MD;  [facility];  Service: Surgical Oncology    TOTAL ABDOMINAL HYSTERECTOMY      OCCURRED PRIOR TO 2002, REASON GIVEN WAS RECURRENT CERVICAL DYSPLASIA    TUBAL LIGATION  1993    US GUIDED THYROID BIOPSY  3/19/2021    US GUIDED THYROID BIOPSY  6/15/2021     Family History   Problem Relation Age of Onset    Diabetes Father     Hypertension Father     Diabetes Family     Hypertension Family     Skin cancer Family     COPD Mother     Breast cancer Maternal Grandmother 61    No Known Problems Sister     No Known Problems Daughter     No Known Problems Maternal Grandfather     No Known Problems Paternal Grandmother     No Known Problems Paternal Grandfather     No Known Problems Maternal Aunt     No Known Problems Maternal Aunt     No Known Problems Paternal Aunt      Social History     Socioeconomic History    Marital status: Single     Spouse name: Not on file    Number of children: Not on file    Years of education: Not on file    Highest education level: Not on file   Occupational History    Not on file   Tobacco Use    Smoking status: Former Smoker     Types: Cigarettes     Quit date:      Years since quittin 3    Smokeless tobacco: Never Used   Vaping Use    Vaping Use: Never used   Substance and Sexual Activity    Alcohol use: Yes     Alcohol/week: 3 0 standard drinks     Types: 3 Standard drinks or equivalent per week     Comment: SOCIAL    Drug use: No     Comment: SUBSTANCE ABUSE IN THE PAST    Sexual activity: Yes     Partners: Male     Birth control/protection: Female Sterilization   Other Topics Concern    Not on file   Social History Narrative    USES SAFETY EQUIPMENT - [employer]     Social Determinants of Health     Financial Resource Strain: Low Risk     Difficulty of Paying Living Expenses: Not hard at all   Food Insecurity: No Food Insecurity    Worried About Running Out of Food in the Last Year: Never true    Shiv of Food in the Last Year: Never true   Transportation Needs: No Transportation Needs    Lack of Transportation (Medical): No    Lack of Transportation (Non-Medical):  No   Physical Activity: Not on file   Stress: Not on file   Social Connections: Not on file   Intimate Partner Violence: Not on file   Housing Stability: Low Risk     Unable to Pay for Housing in the Last Year: No    Number of Places Lived in the Last Year: 1    Unstable Housing in the Last Year: No       Current Outpatient Medications:     conjugated estrogens (Premarin) vaginal cream, Insert 1 g into the vagina daily, Disp: 30 g, Rfl: 2    levothyroxine 125 mcg tablet, Take 1 tablet (125 mcg total) by mouth daily, Disp: 30 tablet, Rfl: 2    methocarbamol (ROBAXIN) 500 mg tablet,  , Disp: , Rfl:     predniSONE 10 mg tablet, 5 x 1 day, 4 x1 day, 3 x 1 day,2 x1 day,  1 x 1 day (Patient not taking: Reported on 12/13/2021 ), Disp: 15 tablet, Rfl: 0  Allergies   Allergen Reactions    Ace Inhibitors Cough    Compazine [Prochlorperazine] Other (See Comments)     "makes me crazy"    Phenothiazines Confusion    Quinolones Other (See Comments)     Patient does not remember reaction    Sulfamethoxazole-Trimethoprim Blisters     In mouth     Vitals:    04/18/22 1144   BP: 120/78   Pulse: 72   Resp: 16   Temp: 98 5 °F (36 9 °C)   SpO2: 98%       Physical Exam  Vitals reviewed  Constitutional:       Appearance: Normal appearance  HENT:      Head: Normocephalic and atraumatic  Right Ear: External ear normal       Left Ear: External ear normal       Nose: Nose normal       Mouth/Throat:      Mouth: Mucous membranes are dry  Eyes:      Extraocular Movements: Extraocular movements intact  Pupils: Pupils are equal, round, and reactive to light  Cardiovascular:      Rate and Rhythm: Normal rate and regular rhythm  Pulses: Normal pulses  Heart sounds: Normal heart sounds  Pulmonary:      Effort: Pulmonary effort is normal       Breath sounds: Normal breath sounds  Abdominal:      General: Abdomen is flat  Palpations: Abdomen is soft  Musculoskeletal:         General: Normal range of motion  Cervical back: Normal range of motion and neck supple  No rigidity or tenderness  Lymphadenopathy:      Cervical: No cervical adenopathy  Skin:     General: Skin is warm and dry  Neurological:      General: No focal deficit present  Mental Status: She is alert and oriented to person, place, and time     Psychiatric:         Mood and Affect: Mood normal          Behavior: Behavior normal          Thought Content: Thought content normal          Judgment: Judgment normal            Results:  Labs:  Lab Results   Component Value Date    [id number] 0 166 (L) 03/29/2022    TSH 1 38 06/14/2021    N2FLXVK 12 5 03/29/2022     Thyroglobulin levels less than 0 1; thyroglobulin antibody levels less than 1 8    Imaging  US head neck lymph node mapping    Result Date: 4/15/2022  Narrative: NECK ULTRASOUND INDICATION:     C73: Malignant neoplasm of thyroid gland  COMPARISON:   None FINDINGS: Ultrasound of the thyroidectomy bed and cervical lymph node chains was performed with a high frequency linear transducer  There is no suspicion of recurrent mass in the thyroidectomy bed  Lymph nodes maintain normal morphologic contour, echogenicity and short axis dimensions of less than 0 7 cm on the right side of the neck  In zone 3 on the left side, there is a 2 x 0 4 x 1 0 cm lymph node with loss of the fatty hilum and demonstrates punctate echogenic foci which may represent calcifications  In zone 4 on the left side, multiple lymph nodes of which the largest one measuring 1 5 x 0 7 x 0 5 cm demonstrates punctate echogenic foci which may represent calcifications  Impression: Suspicious appearing lymph nodes in zone 3 and zone 4 on the left side of the neck  The study was marked in EPIC for significant notification  Workstation performed: [id number]     I reviewed the above laboratory and imaging data  Discussion/Summary:  History of thyroid cancer, with normal thyroglobulin levels consistent with total thyroidectomy  Level 3 and level 4 lymph nodes on left side merit further workup  Will set up for biopsy  Follow-up here once results available for review to discuss further treatment including potential surgery versus continued observation

## 2022-04-18 NOTE — LETTER
April 18, 2022     Teresa Mejia DO  [address]     Patient: Antionette Klein   YOB: 1970   Date of Visit: 4/18/2022       Dear Dr Melvina Clay: Thank you for referring Viviana Flores to me for evaluation  Below are my notes for this consultation  If you have questions, please do not hesitate to call me  I look forward to following your patient along with you  Sincerely,        Lily Merrill MD        CC: MD Chelly Yuan MD Pamella Press, MD  4/18/2022  1:05 PM  Sign when Signing Visit     Surgical Oncology Follow Up       [address] [facility] [address] [address]  [address] Loreen Brittle  1970  [id number]  [address] [place] [address] [facility] [address]  [facility] [id number] [phone]    Chief Complaint   Patient presents with    Follow-up       Assessment/Plan:    No problem-specific Assessment & Plan notes found for this encounter  Diagnoses and all orders for this visit:    Encounter for follow-up surveillance of thyroid cancer  -     US guided thyroid biopsy; Future  -     Thyroglobulin,FNA/Body fluid; Future    Hx of thyroid cancer        Advance Care Planning/Advance Directives:  Discussed disease status, cancer treatment plans and/or cancer treatment goals with the patient  Oncology History   Hx of thyroid cancer   9/21/2021 Surgery    Total thyroidectomy:  - Right lobe, 4 5 cm lesion:  Grossly encapsulated, minimally invasive, follicular carcinoma; confined to thyroid  - Left lobe: Two foci of papillary microcarcinoma (4 mm and 3 mm, respectively) both confined        to thyroid            History of Present Illness:  Patient is a 60-year-old woman status post total thyroidectomy for turned to 4 5 cm right-sided thyroid cancer, follicular, along with 2 foci of microcarcinoma on the left  -Interval History:  She comes in for surveillance with blood work and scan done in anticipation of today's visit  She otherwise feels well and has no major complaints to report  She recent dose adjustment to her thyroid medication but otherwise is doing well  Review of Systems:  Review of Systems   Constitutional: Negative  HENT: Negative  Eyes: Negative  Respiratory: Negative  Cardiovascular: Negative  Gastrointestinal: Negative  Endocrine: Negative  Genitourinary: Negative  Musculoskeletal: Negative  Skin: Negative  Allergic/Immunologic: Negative  Neurological: Negative  Hematological: Negative  Psychiatric/Behavioral: Negative  All other systems reviewed and are negative        Patient Active Problem List   Diagnosis    Generalized osteoarthritis of multiple sites    Acquired hypothyroidism    Stress incontinence, female    Surgical menopause    Lumbar disc disease    Chronic right-sided low back pain without sciatica    Lumbar radiculopathy    Hypertriglyceridemia    Marijuana use, episodic    Status post bariatric surgery    Thyroid nodule    Hx of thyroid cancer    Thiamine deficiency    History of partial gastrectomy    Vaginal atrophy    Encounter for follow-up surveillance of thyroid cancer     Past Medical History:   Diagnosis Date    Abnormal Pap smear of cervix     Cervical dysplasia     LAST ASSESSED: [id number]    Disease of thyroid gland     Generalized anxiety disorder 2/21/2012    HPV (human papilloma virus) infection     Hypothyroid     Minimally invasive follicular carcinoma of thyroid ([facility] [address] ) 9/30/2021     Past Surgical History:   Procedure Laterality Date    BILATERAL SALPINGOOPHORECTOMY Bilateral 2005    CERVICAL BIOPSY  W/ LOOP ELECTRODE EXCISION      COLPOSCOPY      GASTRECTOMY SLEEVE LAPAROSCOPIC  2020    THYROIDECTOMY Bilateral 9/21/2021    Procedure: THYROIDECTOMY, total;  Surgeon: Kaylee Lynn MD;  [facility];  Service: Surgical Oncology    TOTAL ABDOMINAL HYSTERECTOMY      OCCURRED PRIOR TO , REASON GIVEN WAS RECURRENT CERVICAL DYSPLASIA    TUBAL LIGATION      US GUIDED THYROID BIOPSY  3/19/2021    US GUIDED THYROID BIOPSY  6/15/2021     Family History   Problem Relation Age of Onset    Diabetes Father     Hypertension Father     Diabetes Family     Hypertension Family     Skin cancer Family     COPD Mother     Breast cancer Maternal Grandmother 61    No Known Problems Sister     No Known Problems Daughter     No Known Problems Maternal Grandfather     No Known Problems Paternal Grandmother     No Known Problems Paternal Grandfather     No Known Problems Maternal Aunt     No Known Problems Maternal Aunt     No Known Problems Paternal Aunt      Social History     Socioeconomic History    Marital status: Single     Spouse name: Not on file    Number of children: Not on file    Years of education: Not on file    Highest education level: Not on file   Occupational History    Not on file   Tobacco Use    Smoking status: Former Smoker     Types: Cigarettes     Quit date:      Years since quittin 3    Smokeless tobacco: Never Used   Vaping Use    Vaping Use: Never used   Substance and Sexual Activity    Alcohol use:  Yes     Alcohol/week: 3 0 standard drinks     Types: 3 Standard drinks or equivalent per week     Comment: SOCIAL    Drug use: No     Comment: SUBSTANCE ABUSE IN THE PAST    Sexual activity: Yes     Partners: Male     Birth control/protection: Female Sterilization   Other Topics Concern    Not on file   Social History Narrative    USES SAFETY EQUIPMENT - [employer]     Social Determinants of Health     Financial Resource Strain: Low Risk     Difficulty of Paying Living Expenses: Not hard at all   Food Insecurity: No Food Insecurity    Worried About Running Out of Food in the Last Year: Never true    [address] in the Last Year: Never true   Transportation Needs: No Transportation Needs    Lack of Transportation (Medical): No    Lack of Transportation (Non-Medical): No   Physical Activity: Not on file   Stress: Not on file   Social Connections: Not on file   Intimate Partner Violence: Not on file   Housing Stability: Low Risk     Unable to Pay for Housing in the Last Year: No    Number of Places Lived in the Last Year: 1    Unstable Housing in the Last Year: No       Current Outpatient Medications:     conjugated estrogens (Premarin) vaginal cream, Insert 1 g into the vagina daily, Disp: 30 g, Rfl: 2    levothyroxine 125 mcg tablet, Take 1 tablet (125 mcg total) by mouth daily, Disp: 30 tablet, Rfl: 2    methocarbamol (ROBAXIN) 500 mg tablet,  , Disp: , Rfl:     predniSONE 10 mg tablet, 5 x 1 day, 4 x1 day, 3 x 1 day,2 x1 day,  1 x 1 day (Patient not taking: Reported on 12/13/2021 ), Disp: 15 tablet, Rfl: 0  Allergies   Allergen Reactions    Ace Inhibitors Cough    Compazine [Prochlorperazine] Other (See Comments)     "makes me crazy"    Phenothiazines Confusion    Quinolones Other (See Comments)     Patient does not remember reaction    Sulfamethoxazole-Trimethoprim Blisters     In mouth     Vitals:    04/18/22 1144   BP: 120/78   Pulse: 72   Resp: 16   Temp: 98 5 °F (36 9 °C)   SpO2: 98%       Physical Exam  Vitals reviewed  Constitutional:       Appearance: Normal appearance  HENT:      Head: Normocephalic and atraumatic  Right Ear: External ear normal       Left Ear: External ear normal       Nose: Nose normal       Mouth/Throat:      Mouth: Mucous membranes are dry  Eyes:      Extraocular Movements: Extraocular movements intact  Pupils: Pupils are equal, round, and reactive to light  Cardiovascular:      Rate and Rhythm: Normal rate and regular rhythm  Pulses: Normal pulses  Heart sounds: Normal heart sounds     Pulmonary:      Effort: Pulmonary effort is normal       Breath sounds: Normal breath sounds  Abdominal:      General: Abdomen is flat  Palpations: Abdomen is soft  Musculoskeletal:         General: Normal range of motion  Cervical back: Normal range of motion and neck supple  No rigidity or tenderness  Lymphadenopathy:      Cervical: No cervical adenopathy  Skin:     General: Skin is warm and dry  Neurological:      General: No focal deficit present  Mental Status: She is alert and oriented to person, place, and time  Psychiatric:         Mood and Affect: Mood normal          Behavior: Behavior normal          Thought Content: Thought content normal          Judgment: Judgment normal            Results:  Labs:  Lab Results   Component Value Date    [id number] 0 166 (L) 03/29/2022    TSH 1 38 06/14/2021    Z9UHDBP 12 5 03/29/2022     Thyroglobulin levels less than 0 1; thyroglobulin antibody levels less than 1 8    Imaging  US head neck lymph node mapping    Result Date: 4/15/2022  Narrative: NECK ULTRASOUND INDICATION:     C73: Malignant neoplasm of thyroid gland  COMPARISON:   None FINDINGS: Ultrasound of the thyroidectomy bed and cervical lymph node chains was performed with a high frequency linear transducer  There is no suspicion of recurrent mass in the thyroidectomy bed  Lymph nodes maintain normal morphologic contour, echogenicity and short axis dimensions of less than 0 7 cm on the right side of the neck  In zone 3 on the left side, there is a 2 x 0 4 x 1 0 cm lymph node with loss of the fatty hilum and demonstrates punctate echogenic foci which may represent calcifications  In zone 4 on the left side, multiple lymph nodes of which the largest one measuring 1 5 x 0 7 x 0 5 cm demonstrates punctate echogenic foci which may represent calcifications  Impression: Suspicious appearing lymph nodes in zone 3 and zone 4 on the left side of the neck  The study was marked in EPIC for significant notification   Workstation performed: [id number]     I reviewed the above laboratory and imaging data  Discussion/Summary:  History of thyroid cancer, with normal thyroglobulin levels consistent with total thyroidectomy  Level 3 and level 4 lymph nodes on left side merit further workup  Will set up for biopsy  Follow-up here once results available for review to discuss further treatment including potential surgery versus continued observation

## 2022-04-25 ENCOUNTER — HOSPITAL ENCOUNTER (OUTPATIENT)
Dept: MAMMOGRAPHY | Facility: MEDICAL CENTER | Age: 52
Discharge: HOME | End: 2022-04-25
Payer: COMMERCIAL

## 2022-04-25 VITALS — BODY MASS INDEX: 26.68 KG/M2 | WEIGHT: 166.01 LBS | HEIGHT: 66 IN

## 2022-04-25 DIAGNOSIS — Z12.31 ENCOUNTER FOR SCREENING MAMMOGRAM FOR MALIGNANT NEOPLASM OF BREAST: ICD-10-CM

## 2022-04-25 PROCEDURE — 77067 SCR MAMMO BI INCL CAD: CPT

## 2022-04-25 PROCEDURE — 77063 BREAST TOMOSYNTHESIS BI: CPT

## 2022-04-25 NOTE — NURSING NOTE
Call placed to patient to discuss upcoming ultrasound guided left lymph node biopsy with thyroglobulin blood test at [facility]  Allergies reviewed and verified patient does not currently take any anticoagulant medications  Pre procedure instructions including diet and taking own medications discussed with patient  Patient instructed that she may eat normally and take medications as usual before the procedure  Procedure and post procedure expectations and instructions reviewed with the patient  Patient verbalizes understanding and denies any questions at this time  Reminded of the location, date and time of the expected procedure

## 2022-04-26 ENCOUNTER — HOSPITAL ENCOUNTER (OUTPATIENT)
Dept: ULTRASOUND IMAGING | Facility: HOSPITAL | Age: 52
Discharge: HOME | End: 2022-04-26
Attending: SURGERY

## 2022-04-28 ENCOUNTER — HOSPITAL ENCOUNTER (OUTPATIENT)
Dept: ULTRASOUND IMAGING | Facility: HOSPITAL | Age: 52
Discharge: HOME | End: 2022-04-28
Attending: SURGERY

## 2022-04-28 ENCOUNTER — HOSPITAL ENCOUNTER (OUTPATIENT)
Dept: ULTRASOUND IMAGING | Facility: HOSPITAL | Age: 52
Discharge: HOME | End: 2022-04-28
Attending: SURGERY
Payer: COMMERCIAL

## 2022-04-28 DIAGNOSIS — Z85.850 ENCOUNTER FOR FOLLOW-UP SURVEILLANCE OF THYROID CANCER: ICD-10-CM

## 2022-04-28 DIAGNOSIS — Z08 ENCOUNTER FOR FOLLOW-UP SURVEILLANCE OF THYROID CANCER: ICD-10-CM

## 2022-04-28 PROCEDURE — 10006 FNA BX W/US GDN EA ADDL: CPT

## 2022-04-28 PROCEDURE — 86800 THYROGLOBULIN ANTIBODY: CPT | Performed by: STUDENT IN AN ORGANIZED HEALTH CARE EDUCATION/TRAINING PROGRAM

## 2022-04-28 PROCEDURE — 84432 ASSAY OF THYROGLOBULIN: CPT | Performed by: SURGERY

## 2022-04-28 PROCEDURE — 84432 ASSAY OF THYROGLOBULIN: CPT | Performed by: STUDENT IN AN ORGANIZED HEALTH CARE EDUCATION/TRAINING PROGRAM

## 2022-04-28 PROCEDURE — 88172 CYTP DX EVAL FNA 1ST EA SITE: CPT | Performed by: PATHOLOGY

## 2022-04-28 PROCEDURE — 88173 CYTOPATH EVAL FNA REPORT: CPT | Performed by: PATHOLOGY

## 2022-04-28 PROCEDURE — 84432 ASSAY OF THYROGLOBULIN: CPT

## 2022-04-28 PROCEDURE — 10005 FNA BX W/US GDN 1ST LES: CPT

## 2022-04-28 RX ORDER — LIDOCAINE WITH 8.4% SOD BICARB 0.9%(10ML)
10 SYRINGE (ML) INJECTION ONCE
Status: COMPLETED | OUTPATIENT
Start: 2022-04-28 | End: 2022-04-28

## 2022-04-28 RX ADMIN — Medication 5 ML: at 16:10

## 2022-04-28 RX ADMIN — Medication 5 ML: at 17:02

## 2022-05-04 LAB
SCAN RESULT: NORMAL
SCAN RESULT: NORMAL

## 2022-05-05 ENCOUNTER — TELEPHONE (OUTPATIENT)
Dept: SURGICAL ONCOLOGY | Facility: CLINIC | Age: 52
End: 2022-05-05

## 2022-05-05 NOTE — TELEPHONE ENCOUNTER
Spoke to patient and let her know that her recent lymph node biopsy was negative for cancer  Patient verbalized understanding and thanks

## 2022-05-09 ENCOUNTER — OFFICE VISIT (OUTPATIENT)
Dept: SURGICAL ONCOLOGY | Facility: CLINIC | Age: 52
End: 2022-05-09
Payer: COMMERCIAL

## 2022-05-09 VITALS
BODY MASS INDEX: 26.68 KG/M2 | WEIGHT: 166 LBS | SYSTOLIC BLOOD PRESSURE: 118 MMHG | TEMPERATURE: 98 F | OXYGEN SATURATION: 97 % | HEART RATE: 63 BPM | DIASTOLIC BLOOD PRESSURE: 76 MMHG | RESPIRATION RATE: 16 BRPM | HEIGHT: 66 IN

## 2022-05-09 DIAGNOSIS — Z85.850 ENCOUNTER FOR FOLLOW-UP SURVEILLANCE OF THYROID CANCER: Primary | ICD-10-CM

## 2022-05-09 DIAGNOSIS — Z85.850 HX OF THYROID CANCER: ICD-10-CM

## 2022-05-09 DIAGNOSIS — Z08 ENCOUNTER FOR FOLLOW-UP SURVEILLANCE OF THYROID CANCER: Primary | ICD-10-CM

## 2022-05-09 PROCEDURE — 99213 OFFICE O/P EST LOW 20 MIN: CPT | Performed by: SURGERY

## 2022-05-09 NOTE — PROGRESS NOTES
Surgical Oncology Follow Up       [facility] [place]  [id number]   Bassam Lundy  [facility] [address]  [address]  1970  [id number]  [facility] SURG[facility] ONCOL[facility]Y Brian Herring  [address] [place] [id number] [phone]    Chief Complaint   Patient presents with    Follow-up       Assessment/Plan:    No problem-specific Assessment & Plan notes found for this encounter  Diagnoses and all orders for this visit:    Encounter for follow-up surveillance of thyroid cancer  -     US head neck lymph node mapping; Future  -     Thyroglobulin Panel; Future  -     Thyroid Panel With TSH; Future    Hx of thyroid cancer        Advance Care Planning/Advance Directives:  Discussed disease status, cancer treatment plans and/or cancer treatment goals with the patient  Oncology History   Hx of thyroid cancer   9/21/2021 Surgery    Total thyroidectomy:  - Right lobe, 4 5 cm lesion:  Grossly encapsulated, minimally invasive, follicular carcinoma; confined to thyroid  - Left lobe: Two foci of papillary microcarcinoma (4 mm and 3 mm, respectively) both confined        to thyroid  History of Present Illness:  Patient is a 45-year-old woman status post excision of a right thyroid cancer, status post total thyroidectomy   -Interval History:  She is here to discuss lymph node biopsies recently done  Review of Systems:  Review of Systems   Constitutional: Negative  HENT: Negative  Eyes: Negative  Respiratory: Negative  Cardiovascular: Negative  Gastrointestinal: Negative  Endocrine: Negative  Genitourinary: Negative  Musculoskeletal: Negative  Skin: Negative  Allergic/Immunologic: Negative  Neurological: Negative  Hematological: Negative  Psychiatric/Behavioral: Negative  All other systems reviewed and are negative        Patient Active Problem List   Diagnosis    Generalized osteoarthritis of multiple sites    Acquired hypothyroidism    Stress incontinence, female    Surgical menopause    Lumbar disc disease    Chronic right-sided low back pain without sciatica    Lumbar radiculopathy    Hypertriglyceridemia    Marijuana use, episodic    Status post bariatric surgery    Thyroid nodule    Hx of thyroid cancer    Thiamine deficiency    History of partial gastrectomy    Vaginal atrophy    Encounter for follow-up surveillance of thyroid cancer     Past Medical History:   Diagnosis Date    Abnormal Pap smear of cervix     Cervical dysplasia     LAST ASSESSED: [id number]    Disease of thyroid gland     Generalized anxiety disorder 2/21/2012    HPV (human papilloma virus) infection    Blayne Ambrocio Hypothyroid     Minimally invasive follicular carcinoma of thyroid ([facility] [address] ) 9/30/2021     Past Surgical History:   Procedure Laterality Date    BILATERAL SALPINGOOPHORECTOMY Bilateral 2005    CERVICAL BIOPSY  W/ LOOP ELECTRODE EXCISION      COLPOSCOPY      GASTRECTOMY SLEEVE LAPAROSCOPIC  2020    HYSTERECTOMY       age 29    OOPHORECTOMY Bilateral     age 29    THYROIDECTOMY Bilateral 9/21/2021    Procedure: THYROIDECTOMY, total;  Surgeon: Vitaliy Monte MD;  [facility];  Service: Surgical Oncology    TUBAL LIGATION  1993    US GUIDED LYMPH NODE BIOPSY LEFT  4/28/2022    US GUIDED THYROID BIOPSY  3/19/2021    US GUIDED THYROID BIOPSY  6/15/2021     Family History   Problem Relation Age of Onset    Diabetes Father     Hypertension Father     Diabetes Family     Hypertension Family     Skin cancer Family     COPD Mother     Breast cancer Maternal Grandmother 61    No Known Problems Sister     No Known Problems Daughter     No Known Problems Maternal Grandfather     No Known Problems Paternal Grandmother     No Known Problems Paternal Grandfather     No Known Problems Maternal Aunt     No Known Problems Maternal Aunt  No Known Problems Paternal Aunt      Social History     Socioeconomic History    Marital status: Single     Spouse name: Not on file    Number of children: Not on file    Years of education: Not on file    Highest education level: Not on file   Occupational History    Not on file   Tobacco Use    Smoking status: Former Smoker     Types: Cigarettes     Quit date:      Years since quittin 3    Smokeless tobacco: Never Used   Vaping Use    Vaping Use: Never used   Substance and Sexual Activity    Alcohol use: Yes     Alcohol/week: 3 0 standard drinks     Types: 3 Standard drinks or equivalent per week     Comment: SOCIAL    Drug use: No     Comment: SUBSTANCE ABUSE IN THE PAST    Sexual activity: Yes     Partners: Male     Birth control/protection: Female Sterilization   Other Topics Concern    Not on file   Social History Narrative    USES SAFETY EQUIPMENT - [employer]     Social Determinants of Health     Financial Resource Strain: Low Risk     Difficulty of Paying Living Expenses: Not hard at all   Food Insecurity: No Food Insecurity    Worried About Running Out of Food in the Last Year: Never true    Shiv of Food in the Last Year: Never true   Transportation Needs: No Transportation Needs    Lack of Transportation (Medical): No    Lack of Transportation (Non-Medical):  No   Physical Activity: Not on file   Stress: Not on file   Social Connections: Not on file   Intimate Partner Violence: Not on file   Housing Stability: Low Risk     Unable to Pay for Housing in the Last Year: No    Number of Places Lived in the Last Year: 1    Unstable Housing in the Last Year: No       Current Outpatient Medications:     conjugated estrogens (Premarin) vaginal cream, Insert 1 g into the vagina daily, Disp: 30 g, Rfl: 2    levothyroxine 125 mcg tablet, Take 1 tablet (125 mcg total) by mouth daily, Disp: 30 tablet, Rfl: 2    methocarbamol (ROBAXIN) 500 mg tablet, Take by mouth as needed  , Disp: , Rfl:     predniSONE 10 mg tablet, 5 x 1 day, 4 x1 day, 3 x 1 day,2 x1 day,  1 x 1 day (Patient not taking: Reported on 12/13/2021 ), Disp: 15 tablet, Rfl: 0  Allergies   Allergen Reactions    Ace Inhibitors Cough    Compazine [Prochlorperazine] Other (See Comments)     "makes me crazy"    Phenothiazines Confusion    Quinolones Other (See Comments)     Patient does not remember reaction    Sulfamethoxazole-Trimethoprim Blisters     In mouth     Vitals:    05/09/22 1138   BP: 118/76   Pulse: 63   Resp: 16   Temp: 98 °F (36 7 °C)   SpO2: 97%       Physical Exam  Constitutional:       Appearance: Normal appearance  HENT:      Head: Normocephalic and atraumatic  Right Ear: External ear normal       Left Ear: External ear normal       Mouth/Throat:      Mouth: Mucous membranes are moist    Eyes:      Extraocular Movements: Extraocular movements intact  Pupils: Pupils are equal, round, and reactive to light  Cardiovascular:      Rate and Rhythm: Normal rate and regular rhythm  Heart sounds: Normal heart sounds  Pulmonary:      Effort: Pulmonary effort is normal       Breath sounds: Normal breath sounds  Abdominal:      General: Abdomen is flat  Palpations: Abdomen is soft  Musculoskeletal:         General: Normal range of motion  Cervical back: Normal range of motion and neck supple  No rigidity  Lymphadenopathy:      Cervical: No cervical adenopathy  Skin:     General: Skin is warm and dry  Neurological:      General: No focal deficit present  Mental Status: She is alert and oriented to person, place, and time     Psychiatric:         Mood and Affect: Mood normal          Behavior: Behavior normal            Results:  Labs:  Lab Results   Component Value Date    [id number] 0 166 (L) 03/29/2022    TSH 1 38 06/14/2021    S8AAVJF 12 5 03/29/2022     Case Report   Non-gynecologic Cytology                          Case: [id number]                                 Authorizing Provider: Ajith Lee MD           Collected:           04/28/2022 1515               Ordering Location:     [facility]        Received:            04/28/2022 1719                                      [facility]                                                           Pathologist:           Shemar Tejeda MD                                                           Specimens:   A) - Lymph Node, left zone 3                                                                         B) - Lymph Node, left zone 3                                                                         C) - Lymph Node, left zone 4                                                                         D) - Lymph Node, left zone 4                                                               Final Diagnosis   A -B  Lymph Node, left zone 3 (Thin-prep and smear preparations): Atypical cellular changes  Numerous lymphocytes and histiocytes  Very focal clusters of irregular epithelioid cells of undetermined significance, insufficient for further evaluation   -  Correlation with pending thyroglobulin analysis is recommended      Satisfactory for evaluation  Thyroglobulin levels less than 0 1 in level 3 node     C -D  Lymph Node, left zone 4 (Thin-prep and smear preparations):   Negative for malignancy  Scattered lymphocytes  No evidence of metastatic disease   -  Correlation with pending thyroglobulin analysis is recommended      Satisfactory for evaluation         Thyroglobulin levels less than 0 1 in level 4 node   Electronically signed by Shemar Tejeda MD on 5/3/2022 at 12:52 PM           Imaging  US head neck lymph node mapping    Result Date: 4/15/2022  Narrative: NECK ULTRASOUND INDICATION:     C73: Malignant neoplasm of thyroid gland  COMPARISON:   None FINDINGS: Ultrasound of the thyroidectomy bed and cervical lymph node chains was performed with a high frequency linear transducer    There is no suspicion of recurrent mass in the thyroidectomy bed  Lymph nodes maintain normal morphologic contour, echogenicity and short axis dimensions of less than 0 7 cm on the right side of the neck  In zone 3 on the left side, there is a 2 x 0 4 x 1 0 cm lymph node with loss of the fatty hilum and demonstrates punctate echogenic foci which may represent calcifications  In zone 4 on the left side, multiple lymph nodes of which the largest one measuring 1 5 x 0 7 x 0 5 cm demonstrates punctate echogenic foci which may represent calcifications  Impression: Suspicious appearing lymph nodes in zone 3 and zone 4 on the left side of the neck  The study was marked in EPIC for significant notification  Workstation performed: [id number]     Mammo screening bilateral w 3d & cad    Result Date: 4/26/2022  Narrative: DIAGNOSIS: Encounter for screening mammogram for malignant neoplasm of breast TECHNIQUE: Digital screening mammography was performed  Computer Aided Detection (CAD) analyzed all applicable images  COMPARISONS: Prior breast imaging dated: 04/19/2021, 07/29/2019, 07/09/2018, 06/19/2017, and 05/23/2016 RELEVANT HISTORY: Family Breast Cancer History: History of breast cancer in Maternal Grandmother  Family Medical History: Family medical history includes breast cancer in maternal grandmother  Personal History: Hormone history includes estrogen replacement therapy and other  Surgical history includes hysterectomy and oophorectomy  No known relevant medical history  The patient is scheduled in a reminder system for screening mammography  8-10% of cancers will be missed on mammography  Management of a palpable abnormality must be based on clinical grounds  Patients will be notified of their results via letter from our facility  Accredited by [employer] of [employer] and FDA   RISK ASSESSMENT: 5 Year Tyrer-Cuzick: 0 58 % 10 Year Tyrer-Cuzick: 1 23 % Lifetime Tyrer-Cuzick: 5 09 % TISSUE DENSITY: There are scattered areas of fibroglandular density  INDICATION: Kemi Lomas is a 47 y o  female presenting for screening mammography  FINDINGS: There are no suspicious masses, grouped microcalcifications or areas of architectural distortion  The skin and nipple areolar complex are unremarkable  Impression: No mammographic evidence of malignancy  ASSESSMENT/BI-RADS CATEGORY: Left: 1 - Negative Right: 1 - Negative Overall: 1 - Negative RECOMMENDATION:      - Routine screening mammogram in 1 year for both breasts  Workstation ID: [id number]     US guided lymph node biopsy left with thyroglobulin blood test    Result Date: 4/29/2022  Narrative: ULTRASOUND-GUIDED NECK LYMPH NODE BIOPSY HISTORY: 47year-old female with a history of papillary and follicular thyroid carcinoma status post total thyroidectomy on 9/21/2021  Newly identified abnormal appearing left neck level 3 and level 4 lymph nodes  COMPARISON: Head and neck lymph node mapping on 4/11/2022  FINDINGS: On prior head and neck lymph node mapping from 4/11/2022, there was a left level 3 lymph node measuring 2 0 x 0 4 x 1 0 cm and a left level 4 lymph node measuring 1 5 x 0 7 x 0 5 cm that were requested to undergo FNA due to abnormal morphology and the presence of potential calcifications  The patient presented today with a prescription to undergo FNA of the left level 3 and left level 4 lymph nodes with thyroglobulin washing and thyroglobulin serum analysis  On today's limited ultrasound, the left level 3 lymph node measured 1 9 x 0 4 x 0 6 cm  The left level 4 lymph node measured 1 4 x 0 5 x 0 3 cm  The procedure was explained to the patient, including risks of hemorrhage, infection and local injury  Informed consent was freely obtained  The patient verbalized expressed understanding of the above risks and wished to proceed with the procedure  Final standard "time-out" procedure performed  PROCEDURE: The neck was prepped and draped in normal sterile fashion  Under real-time ultrasound guidance and local anesthesia 9 passes with a 25-gauge needle were made through the left level 3 lymph node  Cytopathology was present and deemed the specimens adequate for evaluation  6 specimens were sent to cytology and 3 specimens were sent for thyroglobulin washing  Under real-time ultrasound guidance and local anesthesia 7 passes with a 25-gauge needle were made through the left level 4 lymph node  Cytopathology was present and deemed these initial passes inadequate, therefore 2 additional passes with a 25-gauge needle were performed  5 specimens were sent to cytology and 4 specimens were sent for thyroglobulin washing  Additionally serum thyroglobulin was obtained and sent for requested analysis  The patient tolerated the procedure well  Postprocedure instructions were provided for the patient  I asked the patient to call us with any questions, concerns, or acute problems  The patient expressed understanding of the above  Impression: Status post ultrasound-guided biopsy of the left level 3 and left level 4 lymph nodes  Specimens were sent for cytology analysis and thyroglobulin washing analysis  Additionally a serum thyroglobulin was obtained and sent for requested analysis  I reviewed the above findings and procedure with Dr Jerry Cruz  PERFORMED, DICTATED AND SIGNED BY: Bee Mcgrath PA-C Workstation performed: [id number]     I reviewed the above laboratory and imaging data  Discussion/Summary:  60-year-old woman, status post biopsy of level 3 and level 4 lymph node on left for unusual appearance on ultrasound  Both of these came back negative in terms of thyroglobulin  Level 3 was atypical cells but no evidence of malignancy, and level 4 node was benign on FNA  We will therefore plan on follow-up in 6 months with blood work and ultrasound for continued surveillance at that time

## 2022-06-07 ENCOUNTER — APPOINTMENT (OUTPATIENT)
Dept: LAB | Facility: CLINIC | Age: 52
End: 2022-06-07
Payer: COMMERCIAL

## 2022-06-07 DIAGNOSIS — E03.9 ACQUIRED HYPOTHYROIDISM: ICD-10-CM

## 2022-06-07 LAB
T4 FREE SERPL-MCNC: 1.32 NG/DL (ref 0.76–1.46)
TSH SERPL DL<=0.05 MIU/L-ACNC: 0.79 UIU/ML (ref 0.45–4.5)

## 2022-06-07 PROCEDURE — 84439 ASSAY OF FREE THYROXINE: CPT

## 2022-06-07 PROCEDURE — 84443 ASSAY THYROID STIM HORMONE: CPT

## 2022-06-07 PROCEDURE — 36415 COLL VENOUS BLD VENIPUNCTURE: CPT

## 2022-06-13 ENCOUNTER — OFFICE VISIT (OUTPATIENT)
Dept: FAMILY MEDICINE CLINIC | Facility: CLINIC | Age: 52
End: 2022-06-13
Payer: COMMERCIAL

## 2022-06-13 VITALS
BODY MASS INDEX: 26.74 KG/M2 | WEIGHT: 166.4 LBS | DIASTOLIC BLOOD PRESSURE: 72 MMHG | TEMPERATURE: 97.6 F | SYSTOLIC BLOOD PRESSURE: 118 MMHG | HEIGHT: 66 IN

## 2022-06-13 DIAGNOSIS — E03.9 ACQUIRED HYPOTHYROIDISM: Primary | ICD-10-CM

## 2022-06-13 DIAGNOSIS — Z23 ENCOUNTER FOR IMMUNIZATION: ICD-10-CM

## 2022-06-13 DIAGNOSIS — Z85.850 HX OF THYROID CANCER: ICD-10-CM

## 2022-06-13 DIAGNOSIS — Z11.59 NEED FOR HEPATITIS C SCREENING TEST: ICD-10-CM

## 2022-06-13 DIAGNOSIS — L08.89 PILONIDAL DISEASE OF NATAL CLEFT: ICD-10-CM

## 2022-06-13 DIAGNOSIS — Z11.4 SCREENING FOR HIV (HUMAN IMMUNODEFICIENCY VIRUS): ICD-10-CM

## 2022-06-13 DIAGNOSIS — Z98.84 STATUS POST BARIATRIC SURGERY: ICD-10-CM

## 2022-06-13 DIAGNOSIS — E78.1 HYPERTRIGLYCERIDEMIA: ICD-10-CM

## 2022-06-13 PROCEDURE — 90471 IMMUNIZATION ADMIN: CPT | Performed by: FAMILY MEDICINE

## 2022-06-13 PROCEDURE — 99214 OFFICE O/P EST MOD 30 MIN: CPT | Performed by: FAMILY MEDICINE

## 2022-06-13 PROCEDURE — 90715 TDAP VACCINE 7 YRS/> IM: CPT | Performed by: FAMILY MEDICINE

## 2022-06-13 RX ORDER — AMOXICILLIN 875 MG/1
875 TABLET, COATED ORAL 2 TIMES DAILY
Qty: 20 TABLET | Refills: 0 | Status: SHIPPED | OUTPATIENT
Start: 2022-06-13 | End: 2022-06-23

## 2022-06-13 RX ORDER — AMOXICILLIN 875 MG/1
875 TABLET, COATED ORAL 2 TIMES DAILY
Qty: 20 TABLET | Refills: 0 | Status: SHIPPED | OUTPATIENT
Start: 2022-06-13 | End: 2022-06-13 | Stop reason: SDUPTHER

## 2022-06-13 RX ORDER — FLUCONAZOLE 150 MG/1
150 TABLET ORAL ONCE
Qty: 1 TABLET | Refills: 0 | Status: SHIPPED | OUTPATIENT
Start: 2022-06-13 | End: 2022-06-13

## 2022-06-13 RX ORDER — FLUCONAZOLE 150 MG/1
150 TABLET ORAL ONCE
Qty: 1 TABLET | Refills: 0 | Status: SHIPPED | OUTPATIENT
Start: 2022-06-13 | End: 2022-06-13 | Stop reason: SDUPTHER

## 2022-06-13 RX ORDER — METRONIDAZOLE 500 MG/1
500 TABLET ORAL EVERY 8 HOURS SCHEDULED
Qty: 30 TABLET | Refills: 0 | Status: SHIPPED | OUTPATIENT
Start: 2022-06-13 | End: 2022-06-23

## 2022-06-13 RX ORDER — METRONIDAZOLE 500 MG/1
500 TABLET ORAL EVERY 8 HOURS SCHEDULED
Qty: 30 TABLET | Refills: 0 | Status: SHIPPED | OUTPATIENT
Start: 2022-06-13 | End: 2022-06-13 | Stop reason: SDUPTHER

## 2022-06-13 NOTE — PROGRESS NOTES
Assessment/Plan:    Acquired hypothyroidism  Follow-up with endocrinology  Status post bariatric surgery  She will be due for lab in November weight seems to have stabilized  Continue to monitor diet and exercise  Hx of thyroid cancer  Follow-up with endocrinology/Oncology  Pilonidal disease of philip cleft  No rudy abscess  Hopefully with oral antibiotics this will settle down  She is to call if symptoms do not improve  Diagnoses and all orders for this visit:    Acquired hypothyroidism  -     TSH, 3rd generation; Future    Status post bariatric surgery  -     Comprehensive metabolic panel; Future  -     Vitamin D 25 hydroxy; Future  -     Lipid panel; Future  -     Hemoglobin A1C; Future  -     Ferritin; Future  -     Folate; Future  -     Iron; Future  -     Vitamin B12; Future    Need for hepatitis C screening test  -     Hepatitis C Antibody ([facility]); Future    Screening for HIV (human immunodeficiency virus)  -     HIV 1/2 Antigen/Antibody (4th Generation) w Reflex [facility]; Future    Encounter for immunization  -     TDAP VACCINE GREATER THAN OR EQUAL TO 8YO IM    Hypertriglyceridemia  -     Lipid panel; Future    Hx of thyroid cancer  -     Comprehensive metabolic panel; Future    Pilonidal disease of philip cleft  -     Discontinue: amoxicillin (AMOXIL) 875 mg tablet; Take 1 tablet (875 mg total) by mouth 2 (two) times a day for 10 days  -     Discontinue: metroNIDAZOLE (FLAGYL) 500 mg tablet; Take 1 tablet (500 mg total) by mouth every 8 (eight) hours for 10 days  -     Discontinue: fluconazole (DIFLUCAN) 150 mg tablet; Take 1 tablet (150 mg total) by mouth once for 1 dose  -     amoxicillin (AMOXIL) 875 mg tablet; Take 1 tablet (875 mg total) by mouth 2 (two) times a day for 10 days  -     fluconazole (DIFLUCAN) 150 mg tablet; Take 1 tablet (150 mg total) by mouth once for 1 dose  -     metroNIDAZOLE (FLAGYL) 500 mg tablet;  Take 1 tablet (500 mg total) by mouth every 8 (eight) hours for 10 days          Subjective:   Chief Complaint   Patient presents with    Hypothyroidism    Mass     Buttocks  Noticed today               Patient ID: Misty Mcfarland is a 47 y o  female  She is here for follow-up on her hypothyroidism, history of thyroid cancer, weight, lipid disease, history of bariatric surgery, and review recent specialist visits  She had a biopsy of a lymph node in April which showed some atypical cells and they are continuing to follow it  She is complaining of swelling at the top of her gluteal cleft  The following portions of the patient's history were reviewed and updated as appropriate: allergies, current medications, past family history, past medical history, past social history, past surgical history and problem list     Review of Systems   Constitutional: Negative for activity change, chills, diaphoresis, fatigue, fever and unexpected weight change (110 lb weight loss since her bariatric surgery!)  HENT: Negative for congestion, ear pain, hearing loss, nosebleeds, postnasal drip, rhinorrhea, sinus pressure, sore throat and tinnitus  Eyes: Negative for photophobia, pain, discharge, redness and visual disturbance  Respiratory: Negative for cough, chest tightness, shortness of breath and wheezing  Cardiovascular: Negative for chest pain, palpitations and leg swelling  Denies ARRIOLA   Gastrointestinal: Negative for abdominal pain, blood in stool, constipation, diarrhea, nausea and vomiting  Endocrine: Negative  Genitourinary: Negative for difficulty urinating, dysuria, frequency, hematuria and urgency  Musculoskeletal: Negative for arthralgias, joint swelling and myalgias  Skin: Negative for rash and wound  Tenderness swelling  Allergic/Immunologic: Negative for environmental allergies, food allergies and immunocompromised state  Neurological: Negative for dizziness, tremors, seizures, syncope, weakness, numbness and headaches  Hematological: Negative  Psychiatric/Behavioral: Negative for behavioral problems, confusion, decreased concentration and sleep disturbance  The patient is not nervous/anxious  Objective:      /72   Temp 97 6 °F (36 4 °C)   Ht 5' 6" (1 676 m)   Wt 75 5 kg (166 lb 6 4 oz)   BMI 26 86 kg/m²          Physical Exam  Vitals and nursing note reviewed  Constitutional:       General: She is not in acute distress  Appearance: She is well-developed  HENT:      Head: Normocephalic and atraumatic  Right Ear: Tympanic membrane, ear canal and external ear normal       Left Ear: Tympanic membrane, ear canal and external ear normal       Nose: Nose normal    Eyes:      Conjunctiva/sclera: Conjunctivae normal       Pupils: Pupils are equal, round, and reactive to light  Neck:      Thyroid: No thyromegaly  Vascular: No JVD  Trachea: No tracheal deviation  Cardiovascular:      Rate and Rhythm: Normal rate and regular rhythm  Heart sounds: Normal heart sounds  No murmur heard  Pulmonary:      Effort: Pulmonary effort is normal       Breath sounds: Normal breath sounds  No wheezing or rales  Abdominal:      General: Bowel sounds are normal       Palpations: Abdomen is soft  There is no mass  Tenderness: There is no abdominal tenderness  There is no guarding or rebound  Musculoskeletal:         General: No tenderness  Normal range of motion  Cervical back: Normal range of motion and neck supple  Lymphadenopathy:      Cervical: No cervical adenopathy  Skin:     General: Skin is warm and dry  Capillary Refill: Capillary refill takes less than 2 seconds  Findings: No lesion or rash  Neurological:      General: No focal deficit present  Mental Status: She is alert and oriented to person, place, and time  Cranial Nerves: No cranial nerve deficit  Deep Tendon Reflexes: Reflexes are normal and symmetric     Psychiatric:         Mood and Affect: Mood normal          Thought Content: Thought content normal          Judgment: Judgment normal        BMI Counseling: Body mass index is 26 86 kg/m²  The BMI is above normal  Nutrition recommendations include moderation in carbohydrate intake, increasing intake of lean protein and reducing intake of saturated and trans fat  Exercise recommendations include exercising 3-5 times per week  Rationale for BMI follow-up plan is due to patient being overweight or obese  Depression Screening and Follow-up Plan: Patient was screened for depression during today's encounter  They screened negative with a PHQ-2 score of 0

## 2022-06-13 NOTE — ASSESSMENT & PLAN NOTE
She will be due for lab in November weight seems to have stabilized  Continue to monitor diet and exercise

## 2022-06-13 NOTE — ASSESSMENT & PLAN NOTE
No rudy abscess  Hopefully with oral antibiotics this will settle down  She is to call if symptoms do not improve

## 2022-07-19 ENCOUNTER — TELEPHONE (OUTPATIENT)
Dept: SURGICAL ONCOLOGY | Facility: CLINIC | Age: 52
End: 2022-07-19

## 2022-07-19 NOTE — TELEPHONE ENCOUNTER
Called and spoke with patient to reschedule appt with Dr Strange on 11/14 due to being out of the office  Rescheduled to 11/21 at 10:30am  She was agreeable

## 2022-07-26 LAB — SCAN RESULT: NORMAL

## 2022-08-11 ENCOUNTER — TELEMEDICINE (OUTPATIENT)
Dept: FAMILY MEDICINE CLINIC | Facility: CLINIC | Age: 52
End: 2022-08-11
Payer: COMMERCIAL

## 2022-08-11 DIAGNOSIS — U07.1 COVID-19: Primary | ICD-10-CM

## 2022-08-11 PROCEDURE — 99213 OFFICE O/P EST LOW 20 MIN: CPT | Performed by: FAMILY MEDICINE

## 2022-08-11 RX ORDER — BUDESONIDE 90 UG/1
1 AEROSOL, POWDER RESPIRATORY (INHALATION) 2 TIMES DAILY
Qty: 1 EACH | Refills: 0 | Status: SHIPPED | OUTPATIENT
Start: 2022-08-11

## 2022-08-11 NOTE — PROGRESS NOTES
COVID-19 Outpatient Progress Note    Assessment/Plan:    Problem List Items Addressed This Visit    None     Visit Diagnoses     COVID-19    -  Primary    Relevant Medications    nirmatrelvir & ritonavir (Paxlovid) tablet therapy pack    budesonide (Pulmicort Flexhaler) 90 MCG/ACT inhaler         Disposition:     Discussed symptom directed medication options with patient  Discussed vitamin D, vitamin C, and/or zinc supplementation with patient  [address]    Your healthcare provider and/or public health staff have evaluated you and have determined that you do not need to remain in the hospital at this time   At this time you can be isolated at home where you will be monitored by staff from your local or state health department  You should carefully follow the prevention and isolation steps below until a healthcare provider or local or state health department says that you can return to your normal activities  Stay home except to get medical care    People who are mildly ill with COVID-19 are able to isolate at home during their illness  You should restrict activities outside your home, except for getting medical care  Do not go to work, school, or public areas  Avoid using public transportation, ride-sharing, or taxis  Separate yourself from other people and animals in your home    People: As much as possible, you should stay in a specific room and away from other people in your home  Also, you should use a separate bathroom, if available  Animals: You should restrict contact with pets and other animals while you are sick with COVID-19, just like you would around other people  Although there have not been reports of pets or other animals becoming sick with COVID-19, it is still recommended that people sick with COVID-19 limit contact with animals until more information is known about the virus   When possible, have another member of your household care for your animals while you are sick  If you are sick with COVID-19, avoid contact with your pet, including petting, snuggling, being kissed or licked, and sharing food  If you must care for your pet or be around animals while you are sick, wash your hands before and after you interact with pets and wear a facemask  See COVID-19 and Animals for more information  Call ahead before visiting your doctor    If you have a medical appointment, call the healthcare provider and tell them that you have or may have COVID-19  This will help the healthcare provider's office take steps to keep other people from getting infected or exposed  Wear a facemask    You should wear a facemask when you are around other people (e g , sharing a room or vehicle) or pets and before you enter a healthcare provider's office  If you are not able to wear a facemask (for example, because it causes trouble breathing), then people who live with you should not stay in the same room with you, or they should wear a facemask if they enter your room  Cover your coughs and sneezes    Cover your mouth and nose with a tissue when you cough or sneeze  Throw used tissues in a lined trash can  Immediately wash your hands with soap and water for at least 20 seconds or, if soap and water are not available, clean your hands with an alcohol-based hand  that contains at least 60% alcohol  Clean your hands often    Wash your hands often with soap and water for at least 20 seconds, especially after blowing your nose, coughing, or sneezing; going to the bathroom; and before eating or preparing food  If soap and water are not readily available, use an alcohol-based hand  with at least 60% alcohol, covering all surfaces of your hands and rubbing them together until they feel dry  Soap and water are the best option if hands are visibly dirty  Avoid touching your eyes, nose, and mouth with unwashed hands      Avoid sharing personal household items    You should not share dishes, drinking glasses, cups, eating utensils, towels, or bedding with other people or pets in your home  After using these items, they should be washed thoroughly with soap and water  Clean all "high-touch" surfaces everyday    High touch surfaces include counters, tabletops, doorknobs, bathroom fixtures, toilets, phones, keyboards, tablets, and bedside tables  Also, clean any surfaces that may have blood, stool, or body fluids on them  Use a household cleaning spray or wipe, according to the label instructions  Labels contain instructions for safe and effective use of the cleaning product including precautions you should take when applying the product, such as wearing gloves and making sure you have good ventilation during use of the product  Monitor your symptoms    Seek prompt medical attention if your illness is worsening (e g , difficulty breathing)  Before seeking care, call your healthcare provider and tell them that you have, or are being evaluated for, COVID-19  Put on a facemask before you enter the facility  These steps will help the healthcare provider's office to keep other people in the office or waiting room from getting infected or exposed  Ask your healthcare provider to call the local or [facility] health department  Persons who are placed under active monitoring or facilitated self-monitoring should follow instructions provided by their local health department or occupational health professionals, as appropriate  If you have a medical emergency and need to call 911, notify the dispatch personnel that you have, or are being evaluated for COVID-19  If possible, put on a facemask before emergency medical services arrive  Discontinuing home isolation    Patients with confirmed COVID-19 should remain under home isolation precautions until the following conditions are met:    They have had no fever for at least 24 hours (that is one full day of no fever without the use medicine that reduces fevers)  AND  other symptoms have improved (for example, when their cough or shortness of breath have improved)  AND  If had mild or moderate illness, at least 10 days have passed since their symptoms first appeared or if severe illness (needed oxygen) or immunosuppressed, at least 20 days have passed since symptoms first appeared  Patients with confirmed COVID-19 should also notify close contacts (including their workplace) and ask that they self-quarantine  Currently, close contact is defined as being within 6 feet for 15 minutes or more from the period 24 hours starting 48 hours before symptom onset to the time at which the patient went into isolation   Close contacts of patients diagnosed with COVID-19 should be instructed by the patient to self-quarantine for 14 days from the last time of their last contact with the patient  Source: [employer]      Patient meets criteria for PAXLOVID and they have been counseled appropriately according to EUA documentation released by the FDA  After discussion, patient agrees to treatment  [address] is an investigational medicine used to treat mild-to-moderate COVID-19 in adults and children (15years of age and older weighing at least 80 pounds (40 kg)) with positive results of direct SARS-CoV-2 viral testing, and who are at high risk for progression to severe COVID-19, including hospitalization or death  PAXLOVID is investigational because it is still being studied  There is limited information about the safety and effectiveness of using PAXLOVID to treat people with mild-to-moderate COVID-19      The FDA has authorized the emergency use of PAXLOVID for the treatment of mild-tomoderate COVID-19 in adults and children (15years of age and older weighing at least 80 pounds (40 kg)) with a positive test for the virus that causes COVID-19, and who are at high risk for progression to severe COVID-19, including hospitalization or death, under an EUA  What should I tell my healthcare provider before I take PAXLOVID? Tell your healthcare provider if you:  - Have any allergies  - Have liver or kidney disease  - Are pregnant or plan to become pregnant  - Are breastfeeding a child  - Have any serious illnesses    Tell your healthcare provider about all the medicines you take, including prescription and over-the-counter medicines, vitamins, and herbal supplements  Some medicines may interact with PAXLOVID and may cause serious side effects  Keep a list of your medicines to show your healthcare provider and pharmacist when you get a new medicine  You can ask your healthcare provider or pharmacist for a list of medicines that interact with PAXLOVID  Do not start taking a new medicine without telling your healthcare provider  Your healthcare provider can tell you if it is safe to take PAXLOVID with other medicines  Tell your healthcare provider if you are taking combined hormonal contraceptive  PAXLOVID may affect how your birth control pills work  Females who are able to become pregnant should use another effective alternative form of contraception or an additional barrier method of contraception  Talk to your healthcare provider if you have any questions about contraceptive methods that might be right for you  How do I take PAXLOVID? PAXLOVID consists of 2 medicines: nirmatrelvir and ritonavir  - Take 2 pink tablets of nirmatrelvir with 1 white tablet of ritonavir by mouth 2 times each day (in the morning and in the evening) for 5 days  For each dose, take all 3 tablets at the same time  - If you have kidney disease, talk to your healthcare provider  You may need a different dose  - Swallow the tablets whole  Do not chew, break, or crush the tablets  - Take PAXLOVID with or without food  - Do not stop taking PAXLOVID without talking to your healthcare provider, even if you feel better    - If you miss a dose of PAXLOVID within 8 hours of the time it is usually taken, take it as soon as you remember  If you miss a dose by more than 8 hours, skip the missed dose and take the next dose at your regular time  Do not take 2 doses of PAXLOVID at the same time  - If you take too much PAXLOVID, call your healthcare provider or go to the nearest hospital emergency room right away  - If you are taking a ritonavir- or cobicistat-containing medicine to treat hepatitis C or Human Immunodeficiency Virus (HIV), you should continue to take your medicine as prescribed by your healthcare provider   - Talk to your healthcare provider if you do not feel better or if you feel worse after 5 days  Who should generally not take PAXLOVID? Do not take PAXLOVID if:  You are allergic to nirmatrelvir, ritonavir, or any of the ingredients in PAXLOVID  You are taking any of the following medicines:  - Alfuzosin  - Pethidine, piroxicam, propoxyphene  - Ranolazine  - Amiodarone, dronedarone, flecainide, propafenone, quinidine  - Colchicine  - Lurasidone, pimozide, clozapine  - Dihydroergotamine, ergotamine, methylergonovine  - Lovastatin, simvastatin  - Sildenafil (Revatio®) for pulmonary arterial hypertension (PAH)  - Triazolam, oral midazolam  - Apalutamide  - Carbamazepine, phenobarbital, phenytoin  - Rifampin  - St  Tomass Wort (hypericum perforatum)    What are the important possible side effects of PAXLOVID? Possible side effects of PAXLOVID are:  - Liver Problems  Tell your healthcare provider right away if you have any of these signs and symptoms of liver problems: loss of appetite, yellowing of your skin and the whites of eyes (jaundice), dark-colored urine, pale colored stools and itchy skin, stomach area (abdominal) pain  - Resistance to HIV Medicines  If you have untreated HIV infection, PAXLOVID may lead to some HIV medicines not working as well in the future    - Other possible side effects include: altered sense of taste, diarrhea, high blood pressure, or muscle aches    These are not all the possible side effects of PAXLOVID  Not many people have taken PAXLOVID  Serious and unexpected side effects may happen  Oj Pennington is still being studied, so it is possible that all of the risks are not known at this time  What other treatment choices are there? Like Mackenzie Negus may allow for the emergency use of other medicines to treat people with COVID-19  Go to https://[employer]/ for information on the emergency use of other medicines that are authorized by FDA to treat people with COVID-19  Your healthcare provider may talk with you about clinical trials for which you may be eligible  It is your choice to be treated or not to be treated with PAXLOVID  Should you decide not to receive it or for your child not to receive it, it will not change your standard medical care  What if I am pregnant or breastfeeding? There is no experience treating pregnant women or breastfeeding mothers with PAXLOVID  For a mother and unborn baby, the benefit of taking PAXLOVID may be greater than the risk from the treatment  If you are pregnant, discuss your options and specific situation with your healthcare provider  It is recommended that you use effective barrier contraception or do not have sexual activity while taking PAXLOVID  If you are breastfeeding, discuss your options and specific situation with your healthcare provider  How do I report side effects with PAXLOVID? Contact your healthcare provider if you have any side effects that bother you or do not go away  Report side effects to FDA MedWatch at www fda gov/medwatch or call 9-763-OKU4003 or you can report side effects to [facility]  at the contact information provided below  Website Fax number Telephone number   [employer] [phone]     How should I store Audra Krishna? Store PAXLOVID tablets at room temperature between 68°F to 77°F (20°C to 25°C)  Full fact sheet for patients, parents, and caregivers can be found at: [employer]    I have spent 12 minutes directly with the patient  Greater than 50% of this time was spent in counseling/coordination of care regarding: diagnostic results, prognosis, risks and benefits of treatment options, instructions for management, patient and family education, importance of treatment compliance, risk factor reductions and impressions  Needs to quarantine through Sunday, may return to work on Monday but must wear a mask for an additional 5 days  Encounter provider Saul Hamilton DO    Provider located at [facility] [address]  [facility] [id number] [phone]    Recent Visits  No visits were found meeting these conditions  Showing recent visits within past 7 days and meeting all other requirements  Today's Visits  Date Type Provider Dept   08/11/22 [address], [address] [place] [address] Primary Care   Showing today's visits and meeting all other requirements  Future Appointments  No visits were found meeting these conditions  Showing future appointments within next 150 days and meeting all other requirements       The patient was identified by name and date of birth  Truong Le was informed that this is a telemedicine visit and that the visit is being conducted through   [employer]  and patient was informed that this is not a secure, HIPAA-compliant platform  She agrees to proceed     My office door was closed  No one else was in the room  She acknowledged consent and understanding of privacy and security of the video platform  The patient has agreed to participate and understands they can discontinue the visit at any time      Patient is aware this is a billable service  Patient agrees to participate in a virtual check in via telephone or video visit instead of presenting to the office to address urgent/immediate medical needs  Patient is aware this is a billable service  After connecting through [facility], the patient was identified by name and date of birth  Dione Brooks was informed that this was a telemedicine visit and that the exam was being conducted confidentially over secure lines  My office door was closed  No one else was in the room  Dione Brooks acknowledged consent and understanding of privacy and security of the telemedicine visit  I informed the patient that I have reviewed her record in Epic and presented the opportunity for her to ask any questions regarding the visit today  The patient agreed to participate  Verification of patient location:  Patient is located in the following state in which I hold an active license: [place]    Subjective:   Dione Brooks is a 47 y o  female who is concerned about COVID-19  Patient's symptoms include fever, chills, fatigue, malaise, nasal congestion, rhinorrhea, sore throat, cough and headache  Patient denies anosmia, loss of taste, shortness of breath, chest tightness, abdominal pain, nausea, vomiting, diarrhea and myalgias       - Date of symptom onset: 8/9/2022  - Date of exposure: 8/6/2022      COVID-19 vaccination status: Fully vaccinated with booster    Exposure:   Contact with a person who is under investigation (PUI) for or who is positive for COVID-19 within the last 14 days?: Yes    Hospitalized recently for fever and/or lower respiratory symptoms?: No      Currently a healthcare worker that is involved in direct patient care?: No      Works in a special setting where the risk of COVID-19 transmission may be high? (this may include long-term care, correctional and long term facilities; homeless shelters; assisted-living facilities and group homes ): No      Resident in a special setting where the risk of COVID-19 transmission may be high? (this may include long-term care, correctional and jail facilities; homeless shelters; assisted-living facilities and group homes ): No        She did a home test on Tuesday, it was positive  Repeat test today was also positive  Lab Results   Component Value Date    SARSCOV2 Negative 12/28/2021    SARSCOV2 Detected (A) 12/08/2020    [facility] Not Detected 05/31/2022     Past Medical History:   Diagnosis Date    Abnormal Pap smear of cervix     Cervical dysplasia     LAST ASSESSED: [id number]    Disease of thyroid gland     Generalized anxiety disorder 2/21/2012    HPV (human papilloma virus) infection     Hypothyroid     Minimally invasive follicular carcinoma of thyroid ([address] ) 9/30/2021     Past Surgical History:   Procedure Laterality Date    BILATERAL SALPINGOOPHORECTOMY Bilateral 2005    CERVICAL BIOPSY  W/ LOOP ELECTRODE EXCISION      COLPOSCOPY      GASTRECTOMY SLEEVE LAPAROSCOPIC  2020    HYSTERECTOMY       age 29    OOPHORECTOMY Bilateral     age 29    THYROIDECTOMY Bilateral 9/21/2021    Procedure: THYROIDECTOMY, total;  Surgeon: Dinah Toth MD;  [facility]: [facility];  Service: Surgical Oncology    TUBAL LIGATION  1993    US GUIDED LYMPH NODE BIOPSY LEFT  4/28/2022    US GUIDED THYROID BIOPSY  3/19/2021    US GUIDED THYROID BIOPSY  6/15/2021     Current Outpatient Medications   Medication Sig Dispense Refill    budesonide (Pulmicort Flexhaler) 90 MCG/ACT inhaler Inhale 1 puff 2 (two) times a day Rinse mouth after use   1 each 0    nirmatrelvir & ritonavir (Paxlovid) tablet therapy pack Take 3 tablets by mouth 2 (two) times a day for 5 days Take 2 nirmatrelvir tablets + 1 ritonavir tablet together per dose 30 tablet 0    conjugated estrogens (Premarin) vaginal cream Insert 1 g into the vagina daily 30 g 2    levothyroxine 125 mcg tablet Take 1 tablet (125 mcg total) by mouth daily 30 tablet 2    methocarbamol (ROBAXIN) 500 mg tablet Take by mouth as needed         No current facility-administered medications for this visit  Allergies   Allergen Reactions    Ace Inhibitors Cough    Compazine [Prochlorperazine] Other (See Comments)     "makes me crazy"    Phenothiazines Confusion    Quinolones Other (See Comments)     Patient does not remember reaction    Sulfamethoxazole-Trimethoprim Blisters     In mouth       Review of Systems   Constitutional: Positive for activity change, appetite change, chills, fatigue and fever  HENT: Positive for congestion, rhinorrhea and sore throat  Respiratory: Positive for cough  Negative for chest tightness and shortness of breath  Gastrointestinal: Negative for abdominal pain, diarrhea, nausea and vomiting  Musculoskeletal: Negative for myalgias  Neurological: Positive for headaches  Objective: There were no vitals filed for this visit  Physical Exam  Constitutional:       Appearance: She is well-developed  She is obese  She is ill-appearing  HENT:      Head: Normocephalic and atraumatic  Eyes:      Conjunctiva/sclera: Conjunctivae normal    Pulmonary:      Effort: Pulmonary effort is normal    Neurological:      Mental Status: She is alert and oriented to person, place, and time     Psychiatric:         Judgment: Judgment normal

## 2022-08-18 ENCOUNTER — TELEPHONE (OUTPATIENT)
Dept: ENDOCRINOLOGY | Facility: CLINIC | Age: 52
End: 2022-08-18

## 2022-08-18 NOTE — TELEPHONE ENCOUNTER
Received PA request for Euthyrox from [facility]  Reviewed chart and see pt's on generic Levothyroxine  Called [facility] to confirm  Pharmacist said to disregard request  It was sent in error

## 2022-09-22 DIAGNOSIS — E03.9 ACQUIRED HYPOTHYROIDISM: ICD-10-CM

## 2022-09-22 RX ORDER — LEVOTHYROXINE SODIUM 0.12 MG/1
TABLET ORAL
Qty: 30 TABLET | Refills: 0 | Status: SHIPPED | OUTPATIENT
Start: 2022-09-22 | End: 2022-10-17 | Stop reason: SDUPTHER

## 2022-10-10 ENCOUNTER — APPOINTMENT (OUTPATIENT)
Dept: LAB | Facility: CLINIC | Age: 52
End: 2022-10-10
Payer: COMMERCIAL

## 2022-10-10 DIAGNOSIS — Z11.4 SCREENING FOR HIV (HUMAN IMMUNODEFICIENCY VIRUS): ICD-10-CM

## 2022-10-10 DIAGNOSIS — Z85.850 HX OF THYROID CANCER: ICD-10-CM

## 2022-10-10 DIAGNOSIS — E78.1 HYPERTRIGLYCERIDEMIA: ICD-10-CM

## 2022-10-10 DIAGNOSIS — Z85.850 ENCOUNTER FOR FOLLOW-UP SURVEILLANCE OF THYROID CANCER: ICD-10-CM

## 2022-10-10 DIAGNOSIS — E03.9 ACQUIRED HYPOTHYROIDISM: ICD-10-CM

## 2022-10-10 DIAGNOSIS — Z08 ENCOUNTER FOR FOLLOW-UP SURVEILLANCE OF THYROID CANCER: ICD-10-CM

## 2022-10-10 DIAGNOSIS — Z98.84 STATUS POST BARIATRIC SURGERY: ICD-10-CM

## 2022-10-10 DIAGNOSIS — Z11.59 NEED FOR HEPATITIS C SCREENING TEST: ICD-10-CM

## 2022-10-10 LAB
25(OH)D3 SERPL-MCNC: 24 NG/ML (ref 30–100)
ALBUMIN SERPL BCP-MCNC: 3.8 G/DL (ref 3.5–5)
ALP SERPL-CCNC: 64 U/L (ref 46–116)
ALT SERPL W P-5'-P-CCNC: 25 U/L (ref 12–78)
ANION GAP SERPL CALCULATED.3IONS-SCNC: 4 MMOL/L (ref 4–13)
AST SERPL W P-5'-P-CCNC: 22 U/L (ref 5–45)
BILIRUB SERPL-MCNC: 0.43 MG/DL (ref 0.2–1)
BUN SERPL-MCNC: 15 MG/DL (ref 5–25)
CALCIUM SERPL-MCNC: 9 MG/DL (ref 8.3–10.1)
CHLORIDE SERPL-SCNC: 106 MMOL/L (ref 96–108)
CHOLEST SERPL-MCNC: 192 MG/DL
CO2 SERPL-SCNC: 28 MMOL/L (ref 21–32)
CREAT SERPL-MCNC: 0.86 MG/DL (ref 0.6–1.3)
EST. AVERAGE GLUCOSE BLD GHB EST-MCNC: 105 MG/DL
FERRITIN SERPL-MCNC: 69 NG/ML (ref 8–388)
FOLATE SERPL-MCNC: 11.1 NG/ML (ref 3.1–17.5)
GFR SERPL CREATININE-BSD FRML MDRD: 78 ML/MIN/1.73SQ M
GLUCOSE P FAST SERPL-MCNC: 75 MG/DL (ref 65–99)
HBA1C MFR BLD: 5.3 %
HCV AB SER QL: NORMAL
HDLC SERPL-MCNC: 82 MG/DL
IRON SERPL-MCNC: 66 UG/DL (ref 50–170)
LDLC SERPL CALC-MCNC: 95 MG/DL (ref 0–100)
NONHDLC SERPL-MCNC: 110 MG/DL
POTASSIUM SERPL-SCNC: 3.6 MMOL/L (ref 3.5–5.3)
PROT SERPL-MCNC: 7.1 G/DL (ref 6.4–8.4)
SODIUM SERPL-SCNC: 138 MMOL/L (ref 135–147)
T4 FREE SERPL-MCNC: 1.35 NG/DL (ref 0.76–1.46)
T4 SERPL-MCNC: 11.9 UG/DL (ref 4.7–13.3)
TRIGL SERPL-MCNC: 76 MG/DL
TSH SERPL DL<=0.05 MIU/L-ACNC: 1.64 UIU/ML (ref 0.45–4.5)
VIT B12 SERPL-MCNC: 761 PG/ML (ref 100–900)

## 2022-10-10 PROCEDURE — 82728 ASSAY OF FERRITIN: CPT

## 2022-10-10 PROCEDURE — 84479 ASSAY OF THYROID (T3 OR T4): CPT

## 2022-10-10 PROCEDURE — 80053 COMPREHEN METABOLIC PANEL: CPT

## 2022-10-10 PROCEDURE — 82746 ASSAY OF FOLIC ACID SERUM: CPT

## 2022-10-10 PROCEDURE — 83036 HEMOGLOBIN GLYCOSYLATED A1C: CPT

## 2022-10-10 PROCEDURE — 82306 VITAMIN D 25 HYDROXY: CPT

## 2022-10-10 PROCEDURE — 84443 ASSAY THYROID STIM HORMONE: CPT

## 2022-10-10 PROCEDURE — 36415 COLL VENOUS BLD VENIPUNCTURE: CPT

## 2022-10-10 PROCEDURE — 86803 HEPATITIS C AB TEST: CPT

## 2022-10-10 PROCEDURE — 86800 THYROGLOBULIN ANTIBODY: CPT

## 2022-10-10 PROCEDURE — 87389 HIV-1 AG W/HIV-1&-2 AB AG IA: CPT

## 2022-10-10 PROCEDURE — 84436 ASSAY OF TOTAL THYROXINE: CPT

## 2022-10-10 PROCEDURE — 84439 ASSAY OF FREE THYROXINE: CPT

## 2022-10-10 PROCEDURE — 80061 LIPID PANEL: CPT

## 2022-10-10 PROCEDURE — 83540 ASSAY OF IRON: CPT

## 2022-10-10 PROCEDURE — 84432 ASSAY OF THYROGLOBULIN: CPT

## 2022-10-10 PROCEDURE — 82607 VITAMIN B-12: CPT

## 2022-10-11 LAB
HIV 1+2 AB+HIV1 P24 AG SERPL QL IA: NORMAL
T3RU NFR SERPL: 29 % (ref 24–39)
THYROGLOB AB SERPL-ACNC: <1 IU/ML (ref 0–0.9)
THYROGLOB SERPL-MCNC: <0.1 NG/ML (ref 1.5–38.5)

## 2022-10-17 ENCOUNTER — OFFICE VISIT (OUTPATIENT)
Dept: ENDOCRINOLOGY | Facility: CLINIC | Age: 52
End: 2022-10-17
Payer: COMMERCIAL

## 2022-10-17 VITALS
BODY MASS INDEX: 28.48 KG/M2 | HEART RATE: 99 BPM | OXYGEN SATURATION: 96 % | WEIGHT: 177.2 LBS | SYSTOLIC BLOOD PRESSURE: 118 MMHG | HEIGHT: 66 IN | DIASTOLIC BLOOD PRESSURE: 80 MMHG

## 2022-10-17 DIAGNOSIS — C73 MINIMALLY INVASIVE FOLLICULAR CARCINOMA OF THYROID (HCC): Primary | ICD-10-CM

## 2022-10-17 DIAGNOSIS — Z85.850 HX OF THYROID CANCER: ICD-10-CM

## 2022-10-17 DIAGNOSIS — E55.9 VITAMIN D DEFICIENCY: ICD-10-CM

## 2022-10-17 DIAGNOSIS — E03.9 ACQUIRED HYPOTHYROIDISM: ICD-10-CM

## 2022-10-17 PROCEDURE — 99214 OFFICE O/P EST MOD 30 MIN: CPT | Performed by: PHYSICIAN ASSISTANT

## 2022-10-17 RX ORDER — LEVOTHYROXINE SODIUM 0.12 MG/1
125 TABLET ORAL DAILY
Qty: 90 TABLET | Refills: 2 | Status: SHIPPED | OUTPATIENT
Start: 2022-10-17

## 2022-10-17 RX ORDER — ACETAMINOPHEN 160 MG
2000 TABLET,DISINTEGRATING ORAL DAILY
Qty: 30 CAPSULE | Refills: 5
Start: 2022-10-17

## 2022-10-17 NOTE — PROGRESS NOTES
Established Patient Progress Note       Chief Complaint   Patient presents with   • Thyroid Cancer   • Hypothyroidism        History of Present Illness:     Mikael Martinez is a 47 y o  female with a history of thyroid cancer, post-surgical hypothyroidism, and Vitamin D Deficiency  For the Thyroid Cancer, she had thyroidectomy 9/21/2021  See Pathology report below  Per Dr Yuki Peng note on 4/14/2022 "On final path, the left nodule was a minimally invasive FTC without extension  Path is 4 5x3  9x2 8cm, pT3a  No lymphovascular invasion was noted  There are two areas of micropapillary CA (3mm and 4mm in the right lobe)  No concerned nodes  Background of CLT is noted, but her TG abs are negative"   Due to low risk of recurrence, I-131 therapy was not reccommended  She is also following with Dr eDvin Espino  She had lymph node mapping ultrasound 4/11/2022 which showed 2 suspicious looking lymph nodes on the left side of the neck  FNA of these nodes were performed  The left sided lymph node in zone 3 did show atypically cellular changes but thyroglobulin testing was undetectable  The left zone 4 lymph node was negative for malignancy and thyroglobulin testing was undetectable  She reports that she does not trust the biopsy as the biopsies she had prior to her thyroidectomy did not show malignancy  and would like I-131 therapy  She has repeat ultrasound scheduled 1/77/2022 and follow up with Dr Devin Espino 11/21/2022  For hypothyroidism, she is taking levothyroxine 125mcg daily  Her TSH goal is 0 5 to 2 0  She is feeling OK, but does report fatigue  For the Vitamin D Deficiency, she is not taking supplements but was advised to take 2,000 units daily         Patient Active Problem List   Diagnosis   • Generalized osteoarthritis of multiple sites   • Acquired hypothyroidism   • Stress incontinence, female   • Surgical menopause   • Lumbar disc disease   • Chronic right-sided low back pain without sciatica   • Lumbar radiculopathy   • Hypertriglyceridemia   • Marijuana use, episodic   • Status post bariatric surgery   • Thyroid nodule   • Hx of thyroid cancer   • Thiamine deficiency   • History of partial gastrectomy   • Vaginal atrophy   • Encounter for follow-up surveillance of thyroid cancer   • Pilonidal disease of philip cleft   • Vitamin D deficiency      Past Medical History:   Diagnosis Date   • Abnormal Pap smear of cervix    • Cervical dysplasia     LAST ASSESSED:    • Disease of thyroid gland    • Generalized anxiety disorder 2012   • HPV (human papilloma virus) infection    • Hypothyroid    • Minimally invasive follicular carcinoma of thyroid ([facility] [address] ) 2021      Past Surgical History:   Procedure Laterality Date   • BILATERAL SALPINGOOPHORECTOMY Bilateral    • CERVICAL BIOPSY  W/ LOOP ELECTRODE EXCISION     • COLPOSCOPY     • GASTRECTOMY SLEEVE LAPAROSCOPIC     • HYSTERECTOMY       age 29   • OOPHORECTOMY Bilateral     age 29   • THYROIDECTOMY Bilateral 2021    Procedure: THYROIDECTOMY, total;  Surgeon: Donaldo Torres MD;  [facility];  Service: Surgical Oncology   • TUBAL LIGATION     • US GUIDED LYMPH NODE BIOPSY LEFT  2022   • US GUIDED THYROID BIOPSY  3/19/2021   • US GUIDED THYROID BIOPSY  6/15/2021      Family History   Problem Relation Age of Onset   • Diabetes Father    • Hypertension Father    • Diabetes Family    • Hypertension Family    • Skin cancer Family    • COPD Mother    • Breast cancer Maternal Grandmother 61   • No Known Problems Sister    • No Known Problems Daughter    • No Known Problems Maternal Grandfather    • No Known Problems Paternal Grandmother    • Diabetes type I Paternal Grandfather    • No Known Problems Maternal Aunt    • No Known Problems Maternal Aunt    • No Known Problems Paternal Aunt      Social History     Tobacco Use   • Smoking status: Former Smoker     Types: Cigarettes     Quit date:      Years since quittin 8 • Smokeless tobacco: Never Used   Substance Use Topics   • Alcohol use: Yes     Comment: SOCIAL     Allergies   Allergen Reactions   • Ace Inhibitors Cough   • Compazine [Prochlorperazine] Other (See Comments)     "makes me crazy"   • Phenothiazines Confusion   • Quinolones Other (See Comments)     Patient does not remember reaction   • Sulfamethoxazole-Trimethoprim Blisters     In mouth       Current Outpatient Medications:   •  Cholecalciferol (Vitamin D3) 50 MCG (2000 UT) capsule, Take 1 capsule (2,000 Units total) by mouth daily, Disp: 30 capsule, Rfl: 5  •  levothyroxine 125 mcg tablet, Take 1 tablet (125 mcg total) by mouth daily, Disp: 90 tablet, Rfl: 2  •  budesonide (Pulmicort Flexhaler) 90 MCG/ACT inhaler, Inhale 1 puff 2 (two) times a day Rinse mouth after use  (Patient not taking: Reported on 10/17/2022), Disp: 1 each, Rfl: 0  •  conjugated estrogens (Premarin) vaginal cream, Insert 1 g into the vagina daily (Patient not taking: Reported on 10/17/2022), Disp: 30 g, Rfl: 2  •  methocarbamol (ROBAXIN) 500 mg tablet, Take by mouth as needed   (Patient not taking: Reported on 10/17/2022), Disp: , Rfl:     Review of Systems   Constitutional: Positive for fatigue  Negative for activity change, appetite change, chills, diaphoresis, fever and unexpected weight change  HENT: Negative for trouble swallowing and voice change  Eyes: Negative for visual disturbance  Respiratory: Negative for shortness of breath  Cardiovascular: Negative for chest pain and palpitations  Gastrointestinal: Negative for abdominal pain, constipation and diarrhea  Endocrine: Negative for cold intolerance, heat intolerance, polydipsia, polyphagia and polyuria  Genitourinary: Negative for frequency and menstrual problem  Musculoskeletal: Negative for arthralgias and myalgias  Skin: Negative for rash  Allergic/Immunologic: Negative for food allergies  Neurological: Negative for dizziness and tremors     Hematological: Negative for adenopathy  Psychiatric/Behavioral: Negative for sleep disturbance  All other systems reviewed and are negative  Physical Exam:  Body mass index is 28 6 kg/m²  /80 (BP Location: Left arm, Patient Position: Sitting, Cuff Size: Standard)   Pulse 99   Ht 5' 6" (1 676 m)   Wt 80 4 kg (177 lb 3 2 oz)   SpO2 96%   BMI 28 60 kg/m²    Wt Readings from Last 3 Encounters:   10/17/22 80 4 kg (177 lb 3 2 oz)   06/13/22 75 5 kg (166 lb 6 4 oz)   05/09/22 75 3 kg (166 lb)       Physical Exam  Vitals reviewed  Constitutional:       General: She is not in acute distress  Appearance: She is well-developed  HENT:      Head: Normocephalic and atraumatic  Eyes:      Conjunctiva/sclera: Conjunctivae normal       Pupils: Pupils are equal, round, and reactive to light  Neck:      Thyroid: No thyromegaly  Cardiovascular:      Rate and Rhythm: Normal rate and regular rhythm  Heart sounds: Normal heart sounds  Pulmonary:      Effort: Pulmonary effort is normal  No respiratory distress  Breath sounds: Normal breath sounds  No wheezing or rales  Abdominal:      General: Bowel sounds are normal  There is no distension  Palpations: Abdomen is soft  Tenderness: There is no abdominal tenderness  Musculoskeletal:         General: Normal range of motion  Cervical back: Normal range of motion and neck supple  Skin:     General: Skin is warm and dry  Neurological:      Mental Status: She is alert and oriented to person, place, and time           Labs:     Component      Latest Ref Rng & Units 6/7/2022 6/7/2022 10/10/2022 10/10/2022          10:49 AM 10:49 AM 10:30 AM 10:30 AM   TSH 3RD GENERATON      0 450 - 4 500 uIU/mL 0 794   1 640   Free T4      0 76 - 1 46 ng/dL  1 32     THYROGLOBULIN AB      0 0 - 0 9 IU/mL       Vit D, 25-Hydroxy      30 0 - 100 0 ng/mL   24 0 (L)    Thyroglobulin-NEY      1 5 - 38 5 ng/mL         Component      Latest Ref Rng & Units 10/10/2022 10/10/2022 10/10/2022          10:30 AM 10:30 AM 10:30 AM   TSH 3RD GENERATON      0 450 - 4 500 uIU/mL      Free T4      0 76 - 1 46 ng/dL 1 35     THYROGLOBULIN AB      0 0 - 0 9 IU/mL  <1 0    Vit D, 25-Hydroxy      30 0 - 100 0 ng/mL      Thyroglobulin-NEY      1 5 - 38 5 ng/mL   <0 1 (L)       4/11/2022  NECK ULTRASOUND   INDICATION:     C73: Malignant neoplasm of thyroid gland    COMPARISON:   None    FINDINGS:     Ultrasound of the thyroidectomy bed and cervical lymph node chains was performed with a high frequency linear transducer  There is no suspicion of recurrent mass in the thyroidectomy bed      Lymph nodes maintain normal morphologic contour, echogenicity and short axis dimensions of less than 0 7 cm on the right side of the neck        In zone 3 on the left side, there is a 2 x 0 4 x 1 0 cm lymph node with loss of the fatty hilum and demonstrates punctate echogenic foci which may represent calcifications      In zone 4 on the left side, multiple lymph nodes of which the largest one measuring 1 5 x 0 7 x 0 5 cm demonstrates punctate echogenic foci which may represent calcifications      IMPRESSION:     Suspicious appearing lymph nodes in zone 3 and zone 4 on the left side of the neck  Ultrasound 9/7/2021  NECK ULTRASOUND     INDICATION:     E04 1: Nontoxic single thyroid nodule  Preoperative planning      COMPARISON:  Thyroid ultrasound 3/1/2021     FINDINGS:     Ultrasound of the cervical lymph node chains was performed with a high frequency linear transducer  Borderline prominent right level 2 lymph node measures 1 3 x 0 7 x 1 1 cm  Normal fatty hilum noted      Elongated lymph node on the left at level 4 measures 1 9 x 0 5 x 0 7 cm  Minimal fatty hilum      Otherwise lymph nodes maintain normal morphologic contour, echogenicity and short axis dimensions of less than 0 7 cm    No evidence for microcalcification or focal nodularity      IMPRESSION:     1  Borderline prominent right level 2 lymph node but with otherwise normal morphology  Elongated lymph node on the left at level 4 with minimal fatty hilum  Findings are nonspecific  Otherwise no suspicious lymphadenopathy        Impression & Plan:    Problem List Items Addressed This Visit        Endocrine    Acquired hypothyroidism     TSH at goal in low normal range  Continue current dose of levothyroxine  Relevant Medications    levothyroxine 125 mcg tablet    Other Relevant Orders    TSH, 3rd generation    T4, free       Other    Hx of thyroid cancer     Thyroglobulin remains undetectable  Lymph node mapping ultrasound on 4/14/2022 showed 2 suspicious appearing lymph nodes in zone 3 and zone 4 on the left side of the neck  FNA performed and 1 of the nodes showed atypical results but Thyroglobulin washout testing was undetectable  She is scheduled for repeat lymph node mapping ultrasound 11/7/2022 to monitor these nodes  Will follow up on results and review with Dr Chet Ramirez when results are available  Vitamin D deficiency     Advised her to resume supplement  Relevant Medications    Cholecalciferol (Vitamin D3) 50 MCG (2000 UT) capsule      Other Visit Diagnoses     Minimally invasive follicular carcinoma of thyroid ([facility] [place] [address] )    -  Primary    Relevant Medications    levothyroxine 125 mcg tablet    Other Relevant Orders    TSH, 3rd generation    Thyroglobulin    T4, free          Orders Placed This Encounter   Procedures   • TSH, 3rd generation     This is a patient instruction: This test is non-fasting  Please drink two glasses of water morning of bloodwork  Standing Status:   Future     Standing Expiration Date:   10/17/2023   • Thyroglobulin     Standing Status:   Future     Standing Expiration Date:   10/17/2023   • T4, free     Standing Status:   Future     Standing Expiration Date:   10/17/2023       There are no Patient Instructions on file for this visit        Discussed with the patient and all questioned fully answered  She will call me if any problems arise  Follow-up appointment in 6 months       Counseled patient on diagnostic results, prognosis, risk and benefit of treatment options, instruction for management, importance of treatment compliance, Risk  factor reduction and impressions      Todd Delgado PA-C

## 2022-10-17 NOTE — ASSESSMENT & PLAN NOTE
Thyroglobulin remains undetectable  Lymph node mapping ultrasound on 4/14/2022 showed 2 suspicious appearing lymph nodes in zone 3 and zone 4 on the left side of the neck  FNA performed and 1 of the nodes showed atypical results but Thyroglobulin washout testing was undetectable  She is scheduled for repeat lymph node mapping ultrasound 11/7/2022 to monitor these nodes  Will follow up on results and review with Dr Chet Ramirez when results are available

## 2022-10-26 ENCOUNTER — TELEPHONE (OUTPATIENT)
Dept: HEMATOLOGY ONCOLOGY | Facility: CLINIC | Age: 52
End: 2022-10-26

## 2022-10-26 NOTE — TELEPHONE ENCOUNTER
Confirmed with patient appt  On 11/28/22 w/ Dr Reece Angeles which has been R/S from 11/21/22  Patient prefers to see Dr Reece Angeles

## 2022-11-07 ENCOUNTER — HOSPITAL ENCOUNTER (OUTPATIENT)
Dept: ULTRASOUND IMAGING | Facility: HOSPITAL | Age: 52
Discharge: HOME | End: 2022-11-07
Attending: SURGERY

## 2022-11-07 DIAGNOSIS — Z85.850 ENCOUNTER FOR FOLLOW-UP SURVEILLANCE OF THYROID CANCER: ICD-10-CM

## 2022-11-07 DIAGNOSIS — Z08 ENCOUNTER FOR FOLLOW-UP SURVEILLANCE OF THYROID CANCER: ICD-10-CM

## 2022-11-28 ENCOUNTER — OFFICE VISIT (OUTPATIENT)
Dept: SURGICAL ONCOLOGY | Facility: CLINIC | Age: 52
End: 2022-11-28

## 2022-11-28 VITALS
TEMPERATURE: 98.2 F | HEIGHT: 66 IN | BODY MASS INDEX: 28.61 KG/M2 | SYSTOLIC BLOOD PRESSURE: 119 MMHG | DIASTOLIC BLOOD PRESSURE: 79 MMHG | OXYGEN SATURATION: 97 % | HEART RATE: 77 BPM | WEIGHT: 178 LBS | RESPIRATION RATE: 17 BRPM

## 2022-11-28 DIAGNOSIS — Z85.850 ENCOUNTER FOR FOLLOW-UP SURVEILLANCE OF THYROID CANCER: Primary | ICD-10-CM

## 2022-11-28 DIAGNOSIS — Z08 ENCOUNTER FOR FOLLOW-UP SURVEILLANCE OF THYROID CANCER: Primary | ICD-10-CM

## 2022-11-28 DIAGNOSIS — Z85.850 HX OF THYROID CANCER: ICD-10-CM

## 2022-11-28 NOTE — LETTER
November 28, 2022     Nikolas Cee DO  [address]  [address] [facility]    Patient: Linda Jordan   YOB: 1970   Date of Visit: 11/28/2022       Dear Dr Pato Fritz: Thank you for referring Umu Kim to me for evaluation  Below are my notes for this consultation  If you have questions, please do not hesitate to call me  I look forward to following your patient along with you  Sincerely,        Dwayne Joiner MD        CC: MD Dwayne Trujillo MD  11/28/2022 12:22 PM  Sign when Signing Visit     Surgical Oncology Follow Up       [address] [facility] [address] [address]  [address] [facility] Nikkie Bowles  1970  [id number]  [address] [facility]  [address] [facility] [address] [phone]    Chief Complaint   Patient presents with   • Follow-up       Assessment/Plan:    No problem-specific Assessment & Plan notes found for this encounter  Diagnoses and all orders for this visit:    Encounter for follow-up surveillance of thyroid cancer  -     Thyroglobulin Panel; Future  -     Thyroid Panel With TSH; Future    Hx of thyroid cancer       Advance Care Planning/Advance Directives:  Discussed disease status, cancer treatment plans and/or cancer treatment goals with the patient  Oncology History   Hx of thyroid cancer   9/21/2021 Surgery    Total thyroidectomy:  - Right lobe, 4 5 cm lesion:  Grossly encapsulated, minimally invasive, follicular carcinoma; confined to thyroid  - Left lobe: Two foci of papillary microcarcinoma (4 mm and 3 mm, respectively) both confined        to thyroid  History of Present Illness:  Patient is a 60-year-old woman here for surveillance visit  She is a history of thyroid cancer status post thyroidectomy    She was leaning towards possible iodine treatment but she has held off till now   -Interval History:  She is had blood work and ultrasound done in anticipation of today's visit  All-in-all she feels well  Review of Systems:  Review of Systems   Constitutional: Negative  HENT: Negative  Eyes: Negative  Respiratory: Negative  Cardiovascular: Negative  Gastrointestinal: Negative  Endocrine: Negative  Genitourinary: Negative  Musculoskeletal: Negative  Skin: Negative  Allergic/Immunologic: Negative  Neurological: Negative  Hematological: Negative  Psychiatric/Behavioral: Negative          Patient Active Problem List   Diagnosis   • Generalized osteoarthritis of multiple sites   • Acquired hypothyroidism   • Stress incontinence, female   • Surgical menopause   • Lumbar disc disease   • Chronic right-sided low back pain without sciatica   • Lumbar radiculopathy   • Hypertriglyceridemia   • Marijuana use, episodic   • Status post bariatric surgery   • Thyroid nodule   • Hx of thyroid cancer   • Thiamine deficiency   • History of partial gastrectomy   • Vaginal atrophy   • Encounter for follow-up surveillance of thyroid cancer   • Pilonidal disease of  cleft   • Vitamin D deficiency     Past Medical History:   Diagnosis Date   • Abnormal Pap smear of cervix    • Cervical dysplasia     LAST ASSESSED: [id number]   • Disease of thyroid gland    • Generalized anxiety disorder 2012   • HPV (human papilloma virus) infection    • Hypothyroid    • Minimally invasive follicular carcinoma of thyroid ([facility] [address] ) 2021     Past Surgical History:   Procedure Laterality Date   • BILATERAL SALPINGOOPHORECTOMY Bilateral    • CERVICAL BIOPSY  W/ LOOP ELECTRODE EXCISION     • COLPOSCOPY     • GASTRECTOMY SLEEVE LAPAROSCOPIC     • HYSTERECTOMY       age 29   • OOPHORECTOMY Bilateral     age 29   • THYROIDECTOMY Bilateral 2021    Procedure: THYROIDECTOMY, total;  Surgeon: Edyta Horowitz MD;  [facility]; Service: Surgical Oncology   • TUBAL LIGATION     • US GUIDED LYMPH NODE BIOPSY LEFT  2022   • US GUIDED THYROID BIOPSY  3/19/2021   • US GUIDED THYROID BIOPSY  6/15/2021     Family History   Problem Relation Age of Onset   • Diabetes Father    • Hypertension Father    • Diabetes Family    • Hypertension Family    • Skin cancer Family    • COPD Mother    • Breast cancer Maternal Grandmother 61   • No Known Problems Sister    • No Known Problems Daughter    • No Known Problems Maternal Grandfather    • No Known Problems Paternal Grandmother    • Diabetes type I Paternal Grandfather    • No Known Problems Maternal Aunt    • No Known Problems Maternal Aunt    • No Known Problems Paternal Aunt      Social History     Socioeconomic History   • Marital status: Single     Spouse name: Not on file   • Number of children: Not on file   • Years of education: Not on file   • Highest education level: Not on file   Occupational History   • Not on file   Tobacco Use   • Smoking status: Former     Types: Cigarettes     Quit date:      Years since quittin 9   • Smokeless tobacco: Never   Vaping Use   • Vaping Use: Never used   Substance and Sexual Activity   • Alcohol use: Yes     Comment: SOCIAL   • Drug use: No     Comment: SUBSTANCE ABUSE IN THE PAST   • Sexual activity: Not Currently     Partners: Male     Birth control/protection: Condom Male, Female Sterilization   Other Topics Concern   • Not on file   Social History Narrative    USES SAFETY EQUIPMENT - [employer]     Social Determinants of Health     Financial Resource Strain: Low Risk    • Difficulty of Paying Living Expenses: Not hard at all   Food Insecurity: No Food Insecurity   • Worried About Running Out of Food in the Last Year: Never true   • Ran Out of Food in the Last Year: Never true   Transportation Needs: No Transportation Needs   • Lack of Transportation (Medical): No   • Lack of Transportation (Non-Medical):  No   Physical Activity: Not on file   Stress: Not on file   Social Connections: Not on file   Intimate Partner Violence: Not on file   Housing Stability: Not on file       Current Outpatient Medications:   •  Cholecalciferol (Vitamin D3) 50 MCG (2000 UT) capsule, Take 1 capsule (2,000 Units total) by mouth daily, Disp: 30 capsule, Rfl: 5  •  levothyroxine 125 mcg tablet, Take 1 tablet (125 mcg total) by mouth daily, Disp: 90 tablet, Rfl: 2  •  budesonide (Pulmicort Flexhaler) 90 MCG/ACT inhaler, Inhale 1 puff 2 (two) times a day Rinse mouth after use  (Patient not taking: Reported on 10/17/2022), Disp: 1 each, Rfl: 0  •  conjugated estrogens (Premarin) vaginal cream, Insert 1 g into the vagina daily (Patient not taking: Reported on 10/17/2022), Disp: 30 g, Rfl: 2  •  methocarbamol (ROBAXIN) 500 mg tablet, Take by mouth as needed   (Patient not taking: Reported on 10/17/2022), Disp: , Rfl:   Allergies   Allergen Reactions   • Ace Inhibitors Cough   • Compazine [Prochlorperazine] Other (See Comments)     "makes me crazy"   • Phenothiazines Confusion   • Quinolones Other (See Comments)     Patient does not remember reaction   • Sulfamethoxazole-Trimethoprim Blisters     In mouth     Vitals:    11/28/22 1131   BP: 119/79   Pulse: 77   Resp: 17   Temp: 98 2 °F (36 8 °C)   SpO2: 97%       Physical Exam  Constitutional:       Appearance: Normal appearance  HENT:      Head: Normocephalic and atraumatic  Right Ear: External ear normal       Left Ear: External ear normal       Nose: Nose normal       Mouth/Throat:      Pharynx: Oropharynx is clear  Eyes:      Extraocular Movements: Extraocular movements intact  Pupils: Pupils are equal, round, and reactive to light  Cardiovascular:      Rate and Rhythm: Normal rate and regular rhythm  Heart sounds: Normal heart sounds  Pulmonary:      Effort: Pulmonary effort is normal       Breath sounds: Normal breath sounds  Abdominal:      General: Abdomen is flat        Palpations: Abdomen is soft    Musculoskeletal:         General: Normal range of motion  Cervical back: Normal range of motion and neck supple  No rigidity or tenderness  Lymphadenopathy:      Cervical: No cervical adenopathy  Skin:     General: Skin is warm and dry  Neurological:      General: No focal deficit present  Mental Status: She is alert and oriented to person, place, and time  Psychiatric:         Mood and Affect: Mood normal          Behavior: Behavior normal            Results:  Labs:  Lab Results   Component Value Date    [id number] 1 640 10/10/2022    TSH 1 38 06/14/2021    [id number] 11 9 10/10/2022     Component Ref Range & Units 10/10/22 10:30 AM    Thyroglobulin-NEY 1 5 - 38 5 ng/mL <0 1 Low           Imaging  US head neck lymph node mapping    Result Date: 11/10/2022  Narrative: NECK ULTRASOUND INDICATION:     Z08: Encounter for follow-up examination after completed treatment for malignant neoplasm Z85 850: Personal history of malignant neoplasm of thyroid  COMPARISON:  Ultrasound head and neck lymph node mapping 4/11/2022 and additional priors FINDINGS: Ultrasound of the thyroidectomy bed and cervical lymph node chains was performed with a high frequency linear transducer  There is no suspicion of recurrent mass in the thyroidectomy bed  Previously noted suspicious left zone 3 lymph node is not as well seen due to increased artifact in this region  Based on cine images, short axis measurement of 0 3 cm, grossly unchanged, previously short axis measurement 0 4 cm  See image 151 series 1 ([id number])  Question increased artifact related to interval biopsy  A left zone 4 lymph node measures 1 3 x 0 7 x 0 7 cm  This appears to be the same suspicious lymph node previously measuring 1 5 x 0 7 x 0 5 cm  Overall size is stable   Previously seen punctate echogenic foci microcalcifications not as well appreciated on the current exam  Lymph nodes otherwise maintain normal morphologic contour, echogenicity and short axis dimensions of less than 0 7 cm  No evidence for microcalcification or focal nodularity  Impression: 1  Some increased artifact at the left zone 3 region at the level of the previously seen suspicious lymph node somewhat limits evaluation here but otherwise no significant interval change  No new suspicious lymphadenopathy in the neck  Workstation performed: [id number]     I reviewed the above laboratory and imaging data  Discussion/Summary:  27-year-old woman, history of thyroid cancer, NAD  Follow up in 6 months with blood work and ultrasound  I recommended that she revisit the issue of iodine treatment given her size of tumor    She is considering this and will make arrangements with the endocrinology team

## 2022-11-28 NOTE — PROGRESS NOTES
Surgical Oncology Follow Up       [address] [facility] [address]  [facility] [address]  [address] [facility] KELVIN Luodian  1970  [id number]  [address] [facility] Ema Musa  [facility] [id number] [phone]    Chief Complaint   Patient presents with   • Follow-up       Assessment/Plan:    No problem-specific Assessment & Plan notes found for this encounter  Diagnoses and all orders for this visit:    Encounter for follow-up surveillance of thyroid cancer  -     Thyroglobulin Panel; Future  -     Thyroid Panel With TSH; Future    Hx of thyroid cancer        Advance Care Planning/Advance Directives:  Discussed disease status, cancer treatment plans and/or cancer treatment goals with the patient  Oncology History   Hx of thyroid cancer   9/21/2021 Surgery    Total thyroidectomy:  - Right lobe, 4 5 cm lesion:  Grossly encapsulated, minimally invasive, follicular carcinoma; confined to thyroid  - Left lobe: Two foci of papillary microcarcinoma (4 mm and 3 mm, respectively) both confined        to thyroid  History of Present Illness:  Patient is a 27-year-old woman here for surveillance visit  She is a history of thyroid cancer status post thyroidectomy  She was leaning towards possible iodine treatment but she has held off till now   -Interval History:  She is had blood work and ultrasound done in anticipation of today's visit  All-in-all she feels well  Review of Systems:  Review of Systems   Constitutional: Negative  HENT: Negative  Eyes: Negative  Respiratory: Negative  Cardiovascular: Negative  Gastrointestinal: Negative  Endocrine: Negative  Genitourinary: Negative  Musculoskeletal: Negative  Skin: Negative  Allergic/Immunologic: Negative  Neurological: Negative  Hematological: Negative  Psychiatric/Behavioral: Negative          Patient Active Problem List   Diagnosis   • Generalized osteoarthritis of multiple sites   • Acquired hypothyroidism   • Stress incontinence, female   • Surgical menopause   • Lumbar disc disease   • Chronic right-sided low back pain without sciatica   • Lumbar radiculopathy   • Hypertriglyceridemia   • Marijuana use, episodic   • Status post bariatric surgery   • Thyroid nodule   • Hx of thyroid cancer   • Thiamine deficiency   • History of partial gastrectomy   • Vaginal atrophy   • Encounter for follow-up surveillance of thyroid cancer   • Pilonidal disease of philip cleft   • Vitamin D deficiency     Past Medical History:   Diagnosis Date   • Abnormal Pap smear of cervix    • Cervical dysplasia     LAST ASSESSED: [id number]   • Disease of thyroid gland    • Generalized anxiety disorder 2012   • HPV (human papilloma virus) infection    • Hypothyroid    • Minimally invasive follicular carcinoma of thyroid ([facility] [address] ) 2021     Past Surgical History:   Procedure Laterality Date   • BILATERAL SALPINGOOPHORECTOMY Bilateral    • CERVICAL BIOPSY  W/ LOOP ELECTRODE EXCISION     • COLPOSCOPY     • GASTRECTOMY SLEEVE LAPAROSCOPIC     • HYSTERECTOMY       age 29   • OOPHORECTOMY Bilateral     age 29   • THYROIDECTOMY Bilateral 2021    Procedure: THYROIDECTOMY, total;  Surgeon: Ian Corado MD;  Location: [facility];  Service: Surgical Oncology   • TUBAL LIGATION     • US GUIDED LYMPH NODE BIOPSY LEFT  2022   • US GUIDED THYROID BIOPSY  3/19/2021   • US GUIDED THYROID BIOPSY  6/15/2021     Family History   Problem Relation Age of Onset   • Diabetes Father    • Hypertension Father    • Diabetes Family    • Hypertension Family    • Skin cancer Family    • COPD Mother    • Breast cancer Maternal Grandmother 61   • No Known Problems Sister    • No Known Problems Daughter    • No Known Problems Maternal Grandfather    • No Known Problems Paternal Grandmother • Diabetes type I Paternal Grandfather    • No Known Problems Maternal Aunt    • No Known Problems Maternal Aunt    • No Known Problems Paternal Aunt      Social History     Socioeconomic History   • Marital status: Single     Spouse name: Not on file   • Number of children: Not on file   • Years of education: Not on file   • Highest education level: Not on file   Occupational History   • Not on file   Tobacco Use   • Smoking status: Former     Types: Cigarettes     Quit date: 200     Years since quittin 9   • Smokeless tobacco: Never   Vaping Use   • Vaping Use: Never used   Substance and Sexual Activity   • Alcohol use: Yes     Comment: SOCIAL   • Drug use: No     Comment: SUBSTANCE ABUSE IN THE PAST   • Sexual activity: Not Currently     Partners: Male     Birth control/protection: Condom Male, Female Sterilization   Other Topics Concern   • Not on file   Social History Narrative    USES SAFETY EQUIPMENT - [employer]     Social Determinants of Health     Financial Resource Strain: Low Risk    • Difficulty of Paying Living Expenses: Not hard at all   Food Insecurity: No Food Insecurity   • Worried About Running Out of Food in the Last Year: Never true   • Ran Out of Food in the Last Year: Never true   Transportation Needs: No Transportation Needs   • Lack of Transportation (Medical): No   • Lack of Transportation (Non-Medical): No   Physical Activity: Not on file   Stress: Not on file   Social Connections: Not on file   Intimate Partner Violence: Not on file   Housing Stability: Not on file       Current Outpatient Medications:   •  Cholecalciferol (Vitamin D3) 50 MCG (2000 UT) capsule, Take 1 capsule (2,000 Units total) by mouth daily, Disp: 30 capsule, Rfl: 5  •  levothyroxine 125 mcg tablet, Take 1 tablet (125 mcg total) by mouth daily, Disp: 90 tablet, Rfl: 2  •  budesonide (Pulmicort Flexhaler) 90 MCG/ACT inhaler, Inhale 1 puff 2 (two) times a day Rinse mouth after use   (Patient not taking: Reported on 10/17/2022), Disp: 1 each, Rfl: 0  •  conjugated estrogens (Premarin) vaginal cream, Insert 1 g into the vagina daily (Patient not taking: Reported on 10/17/2022), Disp: 30 g, Rfl: 2  •  methocarbamol (ROBAXIN) 500 mg tablet, Take by mouth as needed   (Patient not taking: Reported on 10/17/2022), Disp: , Rfl:   Allergies   Allergen Reactions   • Ace Inhibitors Cough   • Compazine [Prochlorperazine] Other (See Comments)     "makes me crazy"   • Phenothiazines Confusion   • Quinolones Other (See Comments)     Patient does not remember reaction   • Sulfamethoxazole-Trimethoprim Blisters     In mouth     Vitals:    11/28/22 1131   BP: 119/79   Pulse: 77   Resp: 17   Temp: 98 2 °F (36 8 °C)   SpO2: 97%       Physical Exam  Constitutional:       Appearance: Normal appearance  HENT:      Head: Normocephalic and atraumatic  Right Ear: External ear normal       Left Ear: External ear normal       Nose: Nose normal       Mouth/Throat:      Pharynx: Oropharynx is clear  Eyes:      Extraocular Movements: Extraocular movements intact  Pupils: Pupils are equal, round, and reactive to light  Cardiovascular:      Rate and Rhythm: Normal rate and regular rhythm  Heart sounds: Normal heart sounds  Pulmonary:      Effort: Pulmonary effort is normal       Breath sounds: Normal breath sounds  Abdominal:      General: Abdomen is flat  Palpations: Abdomen is soft  Musculoskeletal:         General: Normal range of motion  Cervical back: Normal range of motion and neck supple  No rigidity or tenderness  Lymphadenopathy:      Cervical: No cervical adenopathy  Skin:     General: Skin is warm and dry  Neurological:      General: No focal deficit present  Mental Status: She is alert and oriented to person, place, and time     Psychiatric:         Mood and Affect: Mood normal          Behavior: Behavior normal            Results:  Labs:  Lab Results   Component Value Date    DPM2QTMGRHDQ 1 640 10/10/2022    TSH 1 38 06/14/2021    F0AHPMQ 11 9 10/10/2022     Component Ref Range & Units 10/10/22 10:30 AM    Thyroglobulin-NEY 1 5 - 38 5 ng/mL <0 1 Low           Imaging  US head neck lymph node mapping    Result Date: 11/10/2022  Narrative: NECK ULTRASOUND INDICATION:     Z08: Encounter for follow-up examination after completed treatment for malignant neoplasm Z85 850: Personal history of malignant neoplasm of thyroid  COMPARISON:  Ultrasound head and neck lymph node mapping 4/11/2022 and additional priors FINDINGS: Ultrasound of the thyroidectomy bed and cervical lymph node chains was performed with a high frequency linear transducer  There is no suspicion of recurrent mass in the thyroidectomy bed  Previously noted suspicious left zone 3 lymph node is not as well seen due to increased artifact in this region  Based on cine images, short axis measurement of 0 3 cm, grossly unchanged, previously short axis measurement 0 4 cm  See image 151 series 1 (1732)  Question increased artifact related to interval biopsy  A left zone 4 lymph node measures 1 3 x 0 7 x 0 7 cm  This appears to be the same suspicious lymph node previously measuring 1 5 x 0 7 x 0 5 cm  Overall size is stable  Previously seen punctate echogenic foci microcalcifications not as well appreciated on the current exam  Lymph nodes otherwise maintain normal morphologic contour, echogenicity and short axis dimensions of less than 0 7 cm  No evidence for microcalcification or focal nodularity  Impression: 1  Some increased artifact at the left zone 3 region at the level of the previously seen suspicious lymph node somewhat limits evaluation here but otherwise no significant interval change  No new suspicious lymphadenopathy in the neck  Workstation performed: [id number]     I reviewed the above laboratory and imaging data  Discussion/Summary:  60-year-old woman, history of thyroid cancer, NAD    Follow up in 6 months with blood work and ultrasound  I recommended that she revisit the issue of iodine treatment given her size of tumor    She is considering this and will make arrangements with the endocrinology team

## 2022-11-29 DIAGNOSIS — C73 MINIMALLY INVASIVE FOLLICULAR CARCINOMA OF THYROID (HCC): Primary | ICD-10-CM

## 2022-12-06 ENCOUNTER — HOSPITAL ENCOUNTER (OUTPATIENT)
Dept: RADIOLOGY | Age: 52
Discharge: HOME | End: 2022-12-06

## 2022-12-06 DIAGNOSIS — C73 MINIMALLY INVASIVE FOLLICULAR CARCINOMA OF THYROID (HCC): ICD-10-CM

## 2022-12-12 ENCOUNTER — OFFICE VISIT (OUTPATIENT)
Dept: FAMILY MEDICINE CLINIC | Facility: CLINIC | Age: 52
End: 2022-12-12

## 2022-12-12 VITALS
TEMPERATURE: 97.8 F | HEIGHT: 66 IN | BODY MASS INDEX: 28.67 KG/M2 | SYSTOLIC BLOOD PRESSURE: 110 MMHG | WEIGHT: 178.4 LBS | DIASTOLIC BLOOD PRESSURE: 72 MMHG

## 2022-12-12 DIAGNOSIS — E03.9 ACQUIRED HYPOTHYROIDISM: Primary | ICD-10-CM

## 2022-12-12 DIAGNOSIS — K03.0: ICD-10-CM

## 2022-12-12 DIAGNOSIS — E78.1 HYPERTRIGLYCERIDEMIA: ICD-10-CM

## 2022-12-12 DIAGNOSIS — Z85.850 HX OF THYROID CANCER: ICD-10-CM

## 2022-12-12 DIAGNOSIS — L73.9 NASAL FOLLICULITIS: ICD-10-CM

## 2022-12-12 DIAGNOSIS — E55.9 VITAMIN D DEFICIENCY: ICD-10-CM

## 2022-12-12 DIAGNOSIS — Z23 ENCOUNTER FOR IMMUNIZATION: ICD-10-CM

## 2022-12-12 DIAGNOSIS — Z98.84 STATUS POST BARIATRIC SURGERY: ICD-10-CM

## 2022-12-12 DIAGNOSIS — M15.9 GENERALIZED OSTEOARTHRITIS OF MULTIPLE SITES: ICD-10-CM

## 2022-12-12 PROBLEM — E04.1 THYROID NODULE: Status: RESOLVED | Noted: 2021-08-09 | Resolved: 2022-12-12

## 2022-12-12 RX ORDER — DIAZEPAM 10 MG/1
TABLET ORAL
Qty: 1 TABLET | Refills: 0 | Status: SHIPPED | OUTPATIENT
Start: 2022-12-12 | End: 2022-12-12 | Stop reason: SDUPTHER

## 2022-12-12 RX ORDER — DIAZEPAM 10 MG/1
TABLET ORAL
Qty: 1 TABLET | Refills: 0 | Status: SHIPPED | OUTPATIENT
Start: 2022-12-12

## 2022-12-12 NOTE — PROGRESS NOTES
Chief Complaint   Patient presents with   • Hypothyroidism     No refills needed      Name: Brenna Vigil      : 1970      MRN: [id number]  Encounter Provider: Funmilayo Mcallister DO  Encounter Date: 2022   Encounter department: [facility] PRIMARY CARE    Assessment & Plan     1  Acquired hypothyroidism    2  Generalized osteoarthritis of multiple sites    3  Hx of thyroid cancer    4  Hypertriglyceridemia    5  Status post bariatric surgery    6  Vitamin D deficiency    7  Excessive dental attrition  -     diazepam (VALIUM) 10 mg tablet; Take 1 hour prior to dental procedure  8  Nasal folliculitis  -     mupirocin (BACTROBAN) 2 % ointment; Apply topically 3 (three) times a day           Subjective      She presents to the office for checkup on her hypothyroidism, status post gastric bypass, history of thyroid cancer, vitamin D deficiency, hypertriglyceridemia, and to discuss her upcoming dental procedure  She needs to have a tooth extracted  She has a history of TMJ and years ago when a dentist remove the tooth they dislocated her jaw  She had to go see an oral surgeon to have it replaced put back in place  Since then she has been very frightened of dental procedures and she is requesting some medication to help sedate her prior to the procedure  Review of Systems   Constitutional: Negative for activity change, chills, diaphoresis, fatigue, fever and unexpected weight change  HENT: Positive for dental problem  Negative for congestion, ear pain, hearing loss, nosebleeds, postnasal drip, rhinorrhea, sinus pressure, sore throat and tinnitus  Eyes: Negative for photophobia, pain, discharge, redness and visual disturbance  Respiratory: Negative for cough, chest tightness, shortness of breath and wheezing  Cardiovascular: Negative for chest pain, palpitations and leg swelling          Denies ARRIOLA   Gastrointestinal: Negative for abdominal pain, blood in stool, constipation, diarrhea, nausea and vomiting  Endocrine: Negative  Genitourinary: Negative for difficulty urinating, dysuria, frequency, hematuria and urgency  Musculoskeletal: Negative for arthralgias, joint swelling and myalgias  Skin: Negative for rash and wound  Denies skin lesions, change in birthmarks   Allergic/Immunologic: Negative for environmental allergies, food allergies and immunocompromised state  Neurological: Negative for dizziness, tremors, seizures, syncope, weakness, numbness and headaches  Hematological: Negative  Psychiatric/Behavioral: Negative for behavioral problems, confusion, decreased concentration and sleep disturbance  The patient is not nervous/anxious  Current Outpatient Medications on File Prior to Visit   Medication Sig   • Cholecalciferol (Vitamin D3) 50 MCG (2000 UT) capsule Take 1 capsule (2,000 Units total) by mouth daily   • levothyroxine 125 mcg tablet Take 1 tablet (125 mcg total) by mouth daily   • methocarbamol (ROBAXIN) 500 mg tablet Take by mouth as needed   • [DISCONTINUED] budesonide (Pulmicort Flexhaler) 90 MCG/ACT inhaler Inhale 1 puff 2 (two) times a day Rinse mouth after use  (Patient not taking: Reported on 10/17/2022)   • [DISCONTINUED] conjugated estrogens (Premarin) vaginal cream Insert 1 g into the vagina daily (Patient not taking: Reported on 10/17/2022)       Objective     /72   Temp 97 8 °F (36 6 °C)   Ht 5' 6" (1 676 m)   Wt 80 9 kg (178 lb 6 4 oz)   BMI 28 79 kg/m²     Physical Exam  Vitals and nursing note reviewed  Constitutional:       General: She is not in acute distress  Appearance: She is well-developed  HENT:      Head: Normocephalic and atraumatic        Right Ear: Tympanic membrane, ear canal and external ear normal       Left Ear: Tympanic membrane, ear canal and external ear normal       Nose: Nose normal       Mouth/Throat:      Mouth: Mucous membranes are moist    Eyes:      Conjunctiva/sclera: Conjunctivae normal       Pupils: Pupils are equal, round, and reactive to light  Neck:      Thyroid: No thyromegaly  Vascular: No JVD  Trachea: No tracheal deviation  Cardiovascular:      Rate and Rhythm: Normal rate and regular rhythm  Heart sounds: Normal heart sounds  No murmur heard  Pulmonary:      Effort: Pulmonary effort is normal       Breath sounds: Normal breath sounds  No wheezing or rales  Abdominal:      General: Bowel sounds are normal       Palpations: Abdomen is soft  There is no mass  Tenderness: There is no abdominal tenderness  There is no guarding or rebound  Musculoskeletal:         General: No tenderness  Normal range of motion  Cervical back: Normal range of motion and neck supple  Lymphadenopathy:      Cervical: No cervical adenopathy  Skin:     General: Skin is warm and dry  Findings: No lesion or rash  Neurological:      Mental Status: She is alert and oriented to person, place, and time  Cranial Nerves: No cranial nerve deficit  Deep Tendon Reflexes: Reflexes are normal and symmetric     Psychiatric:         Judgment: Judgment normal        Jose Pineda DO

## 2023-01-26 ENCOUNTER — TELEPHONE (OUTPATIENT)
Dept: ENDOCRINOLOGY | Facility: CLINIC | Age: 53
End: 2023-01-26

## 2023-01-26 NOTE — TELEPHONE ENCOUNTER
Called [facility] @ [phone] , P A  for thyrogen medication was done over the phone  P A under clinical review it will take up to 24 hrs for a respond    PA CASE # [id number]

## 2023-01-26 NOTE — TELEPHONE ENCOUNTER
---- Message -----   From: Jaquan Skaggs   Sent: 1/25/2023   6:18 PM EST   To: , *   Subject: AUTHORIZATION REQUEST                             Hello your patient will require authorization for  X2 for both injections through [address] [place] [address]   [phone] to obtain authorization     Thank you   Jaquan Skaggs   [facility]   [phone]

## 2023-02-01 ENCOUNTER — HOSPITAL ENCOUNTER (OUTPATIENT)
Dept: RADIOLOGY | Age: 53
Discharge: HOME | End: 2023-02-01

## 2023-02-01 DIAGNOSIS — C73 MALIGNANT NEOPLASM OF THYROID GLAND (HCC): ICD-10-CM

## 2023-02-01 RX ADMIN — THYROTROPIN ALFA 0.9 MG: 0.9 INJECTION, POWDER, FOR SOLUTION INTRAMUSCULAR at 08:40

## 2023-02-02 ENCOUNTER — HOSPITAL ENCOUNTER (OUTPATIENT)
Dept: RADIOLOGY | Age: 53
Discharge: HOME | End: 2023-02-02

## 2023-02-02 RX ADMIN — THYROTROPIN ALFA 0.9 MG: 0.9 INJECTION, POWDER, FOR SOLUTION INTRAMUSCULAR at 08:45

## 2023-02-03 ENCOUNTER — HOSPITAL ENCOUNTER (OUTPATIENT)
Dept: RADIOLOGY | Age: 53
Discharge: HOME | End: 2023-02-03

## 2023-02-03 ENCOUNTER — APPOINTMENT (OUTPATIENT)
Dept: LAB | Age: 53
End: 2023-02-03

## 2023-02-03 DIAGNOSIS — C73 MINIMALLY INVASIVE FOLLICULAR CARCINOMA OF THYROID (HCC): ICD-10-CM

## 2023-02-03 DIAGNOSIS — C73 MALIGNANT NEOPLASM OF THYROID GLAND (HCC): ICD-10-CM

## 2023-02-07 LAB
THYROGLOB AB SERPL-ACNC: <1 IU/ML (ref 0–0.9)
THYROGLOB SERPL-MCNC: <0.1 NG/ML (ref 1.5–38.5)

## 2023-02-08 ENCOUNTER — HOSPITAL ENCOUNTER (OUTPATIENT)
Dept: RADIOLOGY | Age: 53
Discharge: HOME | End: 2023-02-08

## 2023-02-08 DIAGNOSIS — C73 MALIGNANT NEOPLASM OF THYROID GLAND (HCC): ICD-10-CM

## 2023-02-09 ENCOUNTER — OFFICE VISIT (OUTPATIENT)
Dept: FAMILY MEDICINE CLINIC | Facility: CLINIC | Age: 53
End: 2023-02-09

## 2023-02-09 VITALS
WEIGHT: 178 LBS | DIASTOLIC BLOOD PRESSURE: 68 MMHG | BODY MASS INDEX: 28.61 KG/M2 | SYSTOLIC BLOOD PRESSURE: 118 MMHG | HEIGHT: 66 IN

## 2023-02-09 DIAGNOSIS — R10.32 LEFT LOWER QUADRANT PAIN: Primary | ICD-10-CM

## 2023-02-09 DIAGNOSIS — Z85.850 HX OF THYROID CANCER: ICD-10-CM

## 2023-02-09 DIAGNOSIS — Z90.3 HISTORY OF PARTIAL GASTRECTOMY: ICD-10-CM

## 2023-02-09 RX ORDER — DICYCLOMINE HYDROCHLORIDE 10 MG/1
CAPSULE ORAL
Qty: 30 CAPSULE | Refills: 2 | Status: SHIPPED | OUTPATIENT
Start: 2023-02-09

## 2023-02-09 NOTE — PROGRESS NOTES
Chief Complaint   Patient presents with   • Follow-up     ER back pain      Name: Rickey Gomez      : 1970      MRN: [id number]  Encounter Provider: Marc Jc DO  Encounter Date: 2023   Encounter department: [facility] PRIMARY CARE    Assessment & Plan     1  Left lower quadrant pain  -     dicyclomine (BENTYL) 10 mg capsule; 3 times daily as needed  2  Hx of thyroid cancer    3  History of partial gastrectomy           Subjective      Patient states that she is here for follow-up after emergency room visit for flank pain/abdominal pain  States that she was in her usual state of health when she was sitting and eating and all of a sudden experienced severe left lateral spasm  Lasted several hours, and then resolved on its own  Then it reoccurred the following day and was more severe  She went to the emergency room  They did a CAT scan lab work EKG all which was nondiagnostic  She did have some colon spasm, and at that point the pain was radiating primarily into the left lower quadrant  She denies any nausea vomiting or diarrhea  Of note, she recently had radioactive iodine for her history of thyroid cancer  She feels somewhat better today  Appetite is normal     Review of Systems   Constitutional: Negative for activity change, chills, diaphoresis, fatigue, fever and unexpected weight change  HENT: Negative for congestion, ear pain, hearing loss, nosebleeds, postnasal drip, rhinorrhea, sinus pressure, sore throat and tinnitus  Eyes: Negative for photophobia, pain, discharge, redness and visual disturbance  Respiratory: Negative for cough, chest tightness, shortness of breath and wheezing  Cardiovascular: Negative for chest pain, palpitations and leg swelling  Denies ARRIOLA   Gastrointestinal: Positive for abdominal distention and abdominal pain  Negative for blood in stool, constipation, diarrhea, nausea and vomiting  Endocrine: Negative  Genitourinary: Positive for flank pain  Negative for difficulty urinating, dysuria, frequency, hematuria and urgency  Musculoskeletal: Negative for arthralgias, joint swelling and myalgias  Skin: Negative for rash and wound  Denies skin lesions, change in birthmarks   Allergic/Immunologic: Negative for environmental allergies, food allergies and immunocompromised state  Neurological: Negative for dizziness, tremors, seizures, syncope, weakness, numbness and headaches  Hematological: Negative  Psychiatric/Behavioral: Negative for behavioral problems, confusion, decreased concentration and sleep disturbance  The patient is not nervous/anxious  Current Outpatient Medications on File Prior to Visit   Medication Sig   • Cholecalciferol (Vitamin D3) 50 MCG (2000 UT) capsule Take 1 capsule (2,000 Units total) by mouth daily   • levothyroxine 125 mcg tablet Take 1 tablet (125 mcg total) by mouth daily   • methocarbamol (ROBAXIN) 500 mg tablet Take by mouth as needed   • mupirocin (BACTROBAN) 2 % ointment Apply topically 3 (three) times a day   • diazepam (VALIUM) 10 mg tablet Take 1 hour prior to dental procedure  (Patient not taking: Reported on 2/9/2023)       Objective     /68   Ht 5' 6" (1 676 m)   Wt 80 7 kg (178 lb)   BMI 28 73 kg/m²     Physical Exam  Vitals and nursing note reviewed  Constitutional:       General: She is not in acute distress  Appearance: She is well-developed  HENT:      Head: Normocephalic and atraumatic  Right Ear: Tympanic membrane, ear canal and external ear normal       Left Ear: Tympanic membrane, ear canal and external ear normal       Nose: Nose normal    Eyes:      Conjunctiva/sclera: Conjunctivae normal       Pupils: Pupils are equal, round, and reactive to light  Neck:      Thyroid: No thyromegaly  Vascular: No JVD  Trachea: No tracheal deviation  Cardiovascular:      Rate and Rhythm: Normal rate and regular rhythm  Heart sounds: Normal heart sounds  No murmur heard  Pulmonary:      Effort: Pulmonary effort is normal       Breath sounds: Normal breath sounds  No wheezing or rales  Abdominal:      General: Bowel sounds are normal       Palpations: Abdomen is soft  There is no mass  Tenderness: There is abdominal tenderness in the left lower quadrant  There is no right CVA tenderness, left CVA tenderness, guarding or rebound  Negative signs include Horne's sign, Rovsing's sign, McBurney's sign, psoas sign and obturator sign  Hernia: No hernia is present  Musculoskeletal:         General: No tenderness  Normal range of motion  Cervical back: Normal range of motion and neck supple  Lymphadenopathy:      Cervical: No cervical adenopathy  Skin:     General: Skin is warm and dry  Findings: No lesion or rash  Neurological:      Mental Status: She is alert and oriented to person, place, and time  Cranial Nerves: No cranial nerve deficit  Deep Tendon Reflexes: Reflexes are normal and symmetric     Psychiatric:         Judgment: Judgment normal        Lanora Prom, DO

## 2023-03-06 ENCOUNTER — ANNUAL EXAM (OUTPATIENT)
Dept: OBGYN CLINIC | Facility: CLINIC | Age: 53
End: 2023-03-06

## 2023-03-06 VITALS
SYSTOLIC BLOOD PRESSURE: 129 MMHG | WEIGHT: 182 LBS | BODY MASS INDEX: 29.38 KG/M2 | DIASTOLIC BLOOD PRESSURE: 66 MMHG | HEART RATE: 63 BPM

## 2023-03-06 DIAGNOSIS — Z12.31 ENCOUNTER FOR SCREENING MAMMOGRAM FOR MALIGNANT NEOPLASM OF BREAST: ICD-10-CM

## 2023-03-06 DIAGNOSIS — Z01.419 ENCOUNTER FOR ANNUAL ROUTINE GYNECOLOGICAL EXAMINATION: Primary | ICD-10-CM

## 2023-03-06 DIAGNOSIS — N89.8 VAGINAL CYST: ICD-10-CM

## 2023-03-06 NOTE — PROGRESS NOTES
Assessment/Plan     Diagnoses and all orders for this visit:    Encounter for annual routine gynecological examination    Encounter for screening mammogram for malignant neoplasm of breast  -     Mammo screening bilateral w 3d & cad; Future    Vaginal cyst  -     US pelvis complete non OB; Future         Plan  Patient Instructions   Schedule mammogram and pelvic U/S  Return for results with doctor  Call with needs or concerns  Annual GYN exam in 1 year    Return in about 1 year (around 3/6/2024) for Annual GYN exam   Pt verbalized understanding of all discussed  Subjective      Kika Bacon is a 47 y o  female who presents for annual exam  The patient has no complaints today  The patient is sexually active  1partner x 2-3 years  GYN screening history: last pap: approximate date 2021 and was normal and last mammogram: approximate date 2022 and was normal  The patient is not taking hormone replacement therapy  Patient denies post-menopausal vaginal bleeding  MIGUEL BSO age 29 for"pre-cervical cancer"  The patient participates in regular exercise: yes  Discussed the risk of Bone Fx after menopause and the importance of calcium and vitamin DThe patient reports that there is not domestic violence in her life  The patient is not having menopausal symptoms none    Depression Screening Follow-up Plan: Patient's depression screening was negative with a PHQ-2 score of 0  Their PHQ-9 score was 0  Clinically patient does not have depression  No treatment is required  L vaginal cuff cyst noted on CT 2023, pelvic U/S ordered    Pt had Colonoscopy  needs 3 year repeat R/T polyps       Menstrual History:  OB History        3    Para   2    Term   2            AB   1    Living   2       SAB        IAB   1    Ectopic        Multiple        Live Births   2           Obstetric Comments   MENOPAUSE            Menarche age: 13  No LMP recorded  Patient has had a hysterectomy   age 29 for "pre-cancer cells       The following portions of the patient's history were reviewed and updated as appropriate: allergies, current medications, past family history, past medical history, past social history, past surgical history and problem list     Review of Systems  Pertinent items are noted in HPI       Objective      /66   Pulse 63   Wt 82 6 kg (182 lb)   BMI 29 38 kg/m²      General:   alert and oriented, in no acute distress, alert, appears stated age and cooperative   Heart: regular rate and rhythm, S1, S2 normal, no murmur, click, rub or gallop   Lungs: clear to auscultation bilaterally, WNL respiratory effort, negative cough or SOB   Thyroid: Surgically absent   Abdomen: soft, non-tender, without masses or organomegalynegative masses palpa   Vulva: normal   Vagina: normal mucosa, L cyst noted at vaginal cuff with palpation, not on 2/7/2023 CT, 2 4 x 1 4 cm   Cervix: surgically absent   Uterus: surgically absent   Adnexa: surgically absent   Breasts: NT, negative masses palpable, negative masses or discharge         Lab Review  mammogram and pelvic U/S ordered

## 2023-03-06 NOTE — PATIENT INSTRUCTIONS
Schedule mammogram and pelvic U/S  Return for results with doctor  Call with needs or concerns  Annual GYN exam in 1 year      Dick Freeman

## 2023-03-14 ENCOUNTER — HOSPITAL ENCOUNTER (OUTPATIENT)
Dept: ULTRASOUND IMAGING | Facility: HOSPITAL | Age: 53
Discharge: HOME | End: 2023-03-14

## 2023-03-14 DIAGNOSIS — N89.8 VAGINAL CYST: ICD-10-CM

## 2023-03-21 ENCOUNTER — OFFICE VISIT (OUTPATIENT)
Dept: OBGYN CLINIC | Facility: CLINIC | Age: 53
End: 2023-03-21

## 2023-03-21 VITALS
BODY MASS INDEX: 29.18 KG/M2 | SYSTOLIC BLOOD PRESSURE: 126 MMHG | HEART RATE: 64 BPM | HEIGHT: 66 IN | DIASTOLIC BLOOD PRESSURE: 83 MMHG | WEIGHT: 181.6 LBS

## 2023-03-21 DIAGNOSIS — Z71.2 ENCOUNTER TO DISCUSS TEST RESULTS: Primary | ICD-10-CM

## 2023-03-21 DIAGNOSIS — K66.8 PERITONEAL CYST: ICD-10-CM

## 2023-03-21 RX ORDER — ACETAMINOPHEN 500 MG/1
TABLET ORAL
COMMUNITY
Start: 2023-02-07

## 2023-03-21 NOTE — PROGRESS NOTES
PROBLEM GYNECOLOGICAL VISIT    Aide Torrez is a 47 y o  female who presents today for followup  Her general medical history has been reviewed and she reports it as follows:    Past Medical History:   Diagnosis Date   • Abnormal Pap smear of cervix    • Cervical dysplasia     LAST ASSESSED: [id number]   • Disease of thyroid gland    • Generalized anxiety disorder 2012   • HPV (human papilloma virus) infection    • Hypothyroid    • Minimally invasive follicular carcinoma of thyroid ([facility] [address] ) 2021     Past Surgical History:   Procedure Laterality Date   • BILATERAL SALPINGOOPHORECTOMY Bilateral    • CERVICAL BIOPSY  W/ LOOP ELECTRODE EXCISION     • COLPOSCOPY     • GASTRECTOMY SLEEVE LAPAROSCOPIC     • HYSTERECTOMY       age 29   • OOPHORECTOMY Bilateral     age 29   • THYROIDECTOMY Bilateral 2021    Procedure: THYROIDECTOMY, total;  Surgeon: Rodriguez Persaud MD;  [facility];  Service: Surgical Oncology   • TUBAL LIGATION     • US GUIDED LYMPH NODE BIOPSY LEFT  2022   • US GUIDED THYROID BIOPSY  3/19/2021   • US GUIDED THYROID BIOPSY  6/15/2021     OB History        3    Para   2    Term   2            AB   1    Living   2       SAB        IAB   1    Ectopic        Multiple        Live Births   2           Obstetric Comments   MENOPAUSE           Social History     Tobacco Use   • Smoking status: Former     Types: Cigarettes     Quit date:      Years since quittin 2   • Smokeless tobacco: Never   Vaping Use   • Vaping Use: Never used   Substance Use Topics   • Alcohol use: Yes     Comment: SOCIAL   • Drug use: No     Comment: SUBSTANCE ABUSE IN THE PAST     Social History     Substance and Sexual Activity   Sexual Activity Yes   • Partners: Male   • Birth control/protection: Condom Male, Female Sterilization       Current Outpatient Medications   Medication Instructions   • diazepam (VALIUM) 10 mg tablet Take 1 hour prior to dental procedure     • dicyclomine (BENTYL) 10 mg capsule 3 times daily as needed  • EQ Pain Reliever 500 MG tablet TAKE 1 TABLET BY MOUTH EVERY 6 HOURS AS NEEDED FOR MILD PAIN (PAIN SCORE 1-3)   • levothyroxine 125 mcg, Oral, Daily   • methocarbamol (ROBAXIN) 500 mg tablet Oral, As needed   • mupirocin (BACTROBAN) 2 % ointment Topical, 3 times daily   • Vitamin D3 2,000 Units, Oral, Daily       History of Present Illness:   Patient presents for followup s/p pelvic sonogram for incidental findings of a complex cyst noted along the left side of the vaginal cuff in the abdomen  Review of Systems:  Review of Systems   All other systems reviewed and are negative  Physical Exam:  /83 (BP Location: Right arm, Patient Position: Sitting, Cuff Size: Adult)   Pulse 64   Ht 5' 6" (1 676 m)   Wt 82 4 kg (181 lb 9 6 oz)   BMI 29 31 kg/m²   Physical Exam  Constitutional:       Appearance: Normal appearance  Neurological:      Mental Status: She is alert  Vitals reviewed  Discussion:  Discuss with patient pelvic sonogram consist of 2 3cm nonspecific complex cyst along the left vagina  Discuss with patient her options to repeat the scan or get an MRI for further evaluation  Discuss with patient that there would be no surgical management of the small cyst   Patient has opted for repeat scan in 1yr for resolution  Assessment:   1  Peritoneal cyst near the left side of vaginal cuff    Plan:   1  No surgical intervention   2  Repeat sonogram in 1yr for resolution   3  Return to office prn  Reviewed with patient that test results are available in [facility] immediately, but that they will not necessarily be reviewed by me immediately  Explained that I will review results at my earliest opportunity and contact patient appropriately

## 2023-03-21 NOTE — PATIENT INSTRUCTIONS
Thank you for your confidence in our team    We appreciate you and welcome your feedback  If you receive a survey from us, please take a few moments to let us know how we are doing     Sincerely,  Yanique Navarrete, DO
18-Nov-2020 18:49

## 2023-04-24 ENCOUNTER — OFFICE VISIT (OUTPATIENT)
Dept: ENDOCRINOLOGY | Facility: CLINIC | Age: 53
End: 2023-04-24

## 2023-04-24 VITALS
HEART RATE: 80 BPM | BODY MASS INDEX: 28.85 KG/M2 | HEIGHT: 66 IN | DIASTOLIC BLOOD PRESSURE: 70 MMHG | WEIGHT: 179.5 LBS | SYSTOLIC BLOOD PRESSURE: 102 MMHG

## 2023-04-24 DIAGNOSIS — E03.9 ACQUIRED HYPOTHYROIDISM: Primary | ICD-10-CM

## 2023-04-24 DIAGNOSIS — Z85.850 HX OF THYROID CANCER: ICD-10-CM

## 2023-04-24 NOTE — PROGRESS NOTES
"  Established Patient Progress Note       Chief Complaint   Patient presents with   • Thyroid Cancer        History of Present Illness:     Kavtia Fajardo is a 47 y o  female with a history of thyroid cancer, post-surgical hypothyroidism, and Vitamin D Deficiency  She was last seen by Liberty Willams in 10/2022    For the Thyroid Cancer, she had thyroidectomy 9/21/2021  On 4/14/2022 \"On final path, the left nodule was a minimally invasive FTC without extension  Path is 4 5x3  9x2 8cm, pT3a  No lymphovascular invasion was noted  There are two areas of micropapillary CA (3mm and 4mm in the right lobe)  No concerned nodes  Background of CLT is noted, but her TG abs are negative\"   Due to low risk of recurrence, I-131 therapy was not recommended initially but she underwent this in [address] in Feb [address]206 after the uncertain nodes were seen  This was under Thyrogen  She had lymph node mapping ultrasound 4/11/2022 which showed 2 suspicious looking lymph nodes on the left side of the neck  FNA of these nodes were performed  The left sided lymph node in zone 3 did show atypically cellular changes but thyroglobulin testing was undetectable  The left zone 4 lymph node was negative for malignancy and thyroglobulin testing was undetectable  The Feb 2023 WBS did not show any pre-treatment uptake or post treatment scan  TG was still undetectable  She has repeat ultrasound scheduled 1/77/2022 and another scheduled for 5/1/23  Labs are ordered for May 2023    For hypothyroidism, she is taking levothyroxine 125mcg daily  For the Vitamin D Deficiency, she is not taking supplements but was advised to take 2,000 units daily         Patient Active Problem List   Diagnosis   • Generalized osteoarthritis of multiple sites   • Acquired hypothyroidism   • Stress incontinence, female   • Surgical menopause   • Lumbar disc disease   • Chronic right-sided low back pain without sciatica   • Lumbar radiculopathy   • " Hypertriglyceridemia   • Marijuana use, episodic   • Status post bariatric surgery   • Hx of thyroid cancer   • Thiamine deficiency   • History of partial gastrectomy   • Vaginal atrophy   • Encounter for follow-up surveillance of thyroid cancer   • Pilonidal disease of philip cleft   • Vitamin D deficiency      Past Medical History:   Diagnosis Date   • Abnormal Pap smear of cervix    • Cervical dysplasia     LAST ASSESSED:    • Disease of thyroid gland    • Generalized anxiety disorder 2012   • HPV (human papilloma virus) infection    • Hypothyroid    • Minimally invasive follicular carcinoma of thyroid ([facility] [address] ) 2021      Past Surgical History:   Procedure Laterality Date   • BILATERAL SALPINGOOPHORECTOMY Bilateral    • CERVICAL BIOPSY  W/ LOOP ELECTRODE EXCISION     • COLPOSCOPY     • GASTRECTOMY SLEEVE LAPAROSCOPIC     • HYSTERECTOMY       age 29   • OOPHORECTOMY Bilateral     age 29   • THYROIDECTOMY Bilateral 2021    Procedure: THYROIDECTOMY, total;  Surgeon: Melody Herr MD;  [facility];  Service: Surgical Oncology   • TUBAL LIGATION     • US GUIDED LYMPH NODE BIOPSY LEFT  2022   • US GUIDED THYROID BIOPSY  3/19/2021   • US GUIDED THYROID BIOPSY  6/15/2021      Family History   Problem Relation Age of Onset   • Diabetes Father    • Hypertension Father    • Diabetes Family    • Hypertension Family    • Skin cancer Family    • COPD Mother    • Breast cancer Maternal Grandmother 61   • No Known Problems Sister    • No Known Problems Daughter    • No Known Problems Maternal Grandfather    • No Known Problems Paternal Grandmother    • Diabetes type I Paternal Grandfather    • No Known Problems Maternal Aunt    • No Known Problems Maternal Aunt    • No Known Problems Paternal Aunt      Social History     Tobacco Use   • Smoking status: Former     Packs/day: 0 25     Years: 2 00     Pack years: 0 50     Types: Cigarettes     Quit date:      Years since quitting: "33 3   • Smokeless tobacco: Never   Substance Use Topics   • Alcohol use: Yes     Comment: SOCIAL     Allergies   Allergen Reactions   • Ace Inhibitors Cough   • Compazine [Prochlorperazine] Other (See Comments)     \"makes me crazy\"   • Phenothiazines Confusion   • Quinolones Other (See Comments)     Patient does not remember reaction   • Sulfamethoxazole-Trimethoprim Blisters     In mouth       Current Outpatient Medications:   •  dicyclomine (BENTYL) 10 mg capsule, 3 times daily as needed  , Disp: 30 capsule, Rfl: 2  •  EQ Pain Reliever 500 MG tablet, TAKE 1 TABLET BY MOUTH EVERY 6 HOURS AS NEEDED FOR MILD PAIN (PAIN SCORE 1-3), Disp: , Rfl:   •  levothyroxine 125 mcg tablet, Take 1 tablet (125 mcg total) by mouth daily, Disp: 90 tablet, Rfl: 2  •  methocarbamol (ROBAXIN) 500 mg tablet, Take by mouth as needed, Disp: , Rfl:   •  mupirocin (BACTROBAN) 2 % ointment, Apply topically 3 (three) times a day, Disp: 22 g, Rfl: 0  •  Cholecalciferol (Vitamin D3) 50 MCG (2000 UT) capsule, Take 1 capsule (2,000 Units total) by mouth daily (Patient not taking: Reported on 3/21/2023), Disp: 30 capsule, Rfl: 5    Review of Systems   Constitutional: Negative for unexpected weight change  HENT: Negative for trouble swallowing and voice change  Eyes: Negative for visual disturbance  Respiratory: Negative for shortness of breath  Cardiovascular: Negative for palpitations  Gastrointestinal: Negative for constipation and diarrhea  Endocrine: Negative for cold intolerance and heat intolerance  Genitourinary: Negative for menstrual problem  Neurological: Negative for tremors  Psychiatric/Behavioral: The patient is not nervous/anxious  Physical Exam:  Body mass index is 28 97 kg/m²    /70 (BP Location: Left arm, Patient Position: Sitting, Cuff Size: Large)   Pulse 80   Ht 5' 6\" (1 676 m)   Wt 81 4 kg (179 lb 8 oz)   BMI 28 97 kg/m²    Wt Readings from Last 3 Encounters:   04/24/23 81 4 kg (179 lb 8 oz) " 03/21/23 82 4 kg (181 lb 9 6 oz)   03/06/23 82 6 kg (182 lb)         Labs:   2/3/2023  Stimulated TG <0 1     Latest Reference Range & Units 12/13/21 11:22 03/29/22 10:10 06/07/22 10:49 10/10/22 10:30   TSH 3RD GENERATON 0 450 - 4 500 uIU/mL 2 930 0 166 (L) 0 794 1 640   Free T4 0 76 - 1 46 ng/dL 1 35 1 47 (H) 1 32 1 35   T4 TOTAL 4 7 - 13 3 ug/dL  12 5  11 9   THYROGLOBULIN AB 0 0 - 0 9 IU/mL    <1 0   Thyroglobulin-NEY 1 5 - 38 5 ng/mL    <0 1 (L)   (L): Data is abnormally low  (H): Data is abnormally high    Radiology  Feb 2023  F/u WBS  COMPARISON:  Pretherapy scan 2/3/2023     FINDINGS:      Whole body images now demonstrate foci radiotracer uptake in the region of the right thyroid bed      No findings for distant metastasis      Otherwise physiologic distribution of the radiotracer        IMPRESSION:     1  Post therapy scan now demonstrates foci of radiotracer uptake in the right thyroid bed  These could represent thyroid remnant versus local tyrone disease  2   No findings for distant metastasis    Pathology   9/2021  Thyroid thyroidectomy:     1  Right lobe, 4 5 cm lesion:  Grossly encapsulated, minimally invasive, follicular carcinoma;        confined to thyroid  ICD10   C73     2  Left lobe: Two foci of papillary microcarcinoma (4 mm and 3 mm, respectively) both confined        to thyroid  ICD10   C73     3  Background thyroid with severe Hashimoto thryoditis and few scattered sub-centimeter        hyperplasia nodules      1  Acquired hypothyroidism        2  Hx of thyroid cancer            1  Follicular thyroid CA: She is stage 1, [id number]  Her suppressed thyroglobulin has been undetectable  There is not currently evidence of structural disease  F/u neck ultrasound for node surveillance       2  Hypothyroidism: Goal TSH 0 5-2 as she is stage 1  Continue levothyroxine 125mcg daily  No orders of the defined types were placed in this encounter        There are no Patient Instructions on file for this visit  Discussed with the patient and all questioned fully answered  She will call me if any problems arise  Follow-up appointment in 6 months       Counseled patient on diagnostic results, prognosis, risk and benefit of treatment options, instruction for management, importance of treatment compliance, Risk  factor reduction and impressions      Erik Nuno MD

## 2023-05-01 ENCOUNTER — HOSPITAL ENCOUNTER (OUTPATIENT)
Dept: ULTRASOUND IMAGING | Facility: HOSPITAL | Age: 53
Discharge: HOME | End: 2023-05-01
Attending: SURGERY

## 2023-05-01 DIAGNOSIS — Z85.850 HX OF THYROID CANCER: ICD-10-CM

## 2023-05-01 DIAGNOSIS — Z08 ENCOUNTER FOR FOLLOW-UP SURVEILLANCE OF THYROID CANCER: ICD-10-CM

## 2023-05-01 DIAGNOSIS — Z85.850 ENCOUNTER FOR FOLLOW-UP SURVEILLANCE OF THYROID CANCER: ICD-10-CM

## 2023-05-12 ENCOUNTER — APPOINTMENT (OUTPATIENT)
Dept: LAB | Facility: CLINIC | Age: 53
End: 2023-05-12

## 2023-05-12 DIAGNOSIS — Z08 ENCOUNTER FOR FOLLOW-UP SURVEILLANCE OF THYROID CANCER: ICD-10-CM

## 2023-05-12 DIAGNOSIS — Z85.850 PERSONAL HISTORY OF MALIGNANT NEOPLASM OF THYROID: ICD-10-CM

## 2023-05-12 DIAGNOSIS — E03.9 ACQUIRED HYPOTHYROIDISM: ICD-10-CM

## 2023-05-12 DIAGNOSIS — C73 MINIMALLY INVASIVE FOLLICULAR CARCINOMA OF THYROID (HCC): ICD-10-CM

## 2023-05-12 DIAGNOSIS — Z08 VAGINAL PAP SMEAR FOLLOWING HYSTERECTOMY FOR MALIGNANCY: ICD-10-CM

## 2023-05-12 DIAGNOSIS — Z90.710 VAGINAL PAP SMEAR FOLLOWING HYSTERECTOMY FOR MALIGNANCY: ICD-10-CM

## 2023-05-12 DIAGNOSIS — Z85.850 ENCOUNTER FOR FOLLOW-UP SURVEILLANCE OF THYROID CANCER: ICD-10-CM

## 2023-05-12 LAB
T4 FREE SERPL-MCNC: 1.15 NG/DL (ref 0.76–1.46)
T4 SERPL-MCNC: 11.3 UG/DL (ref 4.7–13.3)
TSH SERPL DL<=0.05 MIU/L-ACNC: 4.6 UIU/ML (ref 0.45–4.5)

## 2023-05-13 LAB
THYROGLOB AB SERPL-ACNC: <1 IU/ML (ref 0–0.9)
THYROGLOB SERPL-MCNC: <0.1 NG/ML (ref 1.5–38.5)

## 2023-05-14 LAB — T3RU NFR SERPL: 26 % (ref 24–39)

## 2023-05-15 ENCOUNTER — HOSPITAL ENCOUNTER (OUTPATIENT)
Dept: MAMMOGRAPHY | Facility: MEDICAL CENTER | Age: 53
Discharge: HOME | End: 2023-05-15

## 2023-05-15 ENCOUNTER — OFFICE VISIT (OUTPATIENT)
Dept: SURGICAL ONCOLOGY | Facility: CLINIC | Age: 53
End: 2023-05-15

## 2023-05-15 VITALS
TEMPERATURE: 97.7 F | HEIGHT: 66 IN | HEART RATE: 62 BPM | WEIGHT: 172 LBS | RESPIRATION RATE: 16 BRPM | SYSTOLIC BLOOD PRESSURE: 120 MMHG | DIASTOLIC BLOOD PRESSURE: 70 MMHG | BODY MASS INDEX: 27.64 KG/M2

## 2023-05-15 VITALS — HEIGHT: 66 IN | WEIGHT: 179.45 LBS | BODY MASS INDEX: 28.84 KG/M2

## 2023-05-15 DIAGNOSIS — Z12.31 ENCOUNTER FOR SCREENING MAMMOGRAM FOR MALIGNANT NEOPLASM OF BREAST: ICD-10-CM

## 2023-05-15 DIAGNOSIS — Z85.850 ENCOUNTER FOR FOLLOW-UP SURVEILLANCE OF THYROID CANCER: Primary | ICD-10-CM

## 2023-05-15 DIAGNOSIS — E03.9 ACQUIRED HYPOTHYROIDISM: Primary | ICD-10-CM

## 2023-05-15 DIAGNOSIS — Z85.850 HX OF THYROID CANCER: ICD-10-CM

## 2023-05-15 DIAGNOSIS — Z08 ENCOUNTER FOR FOLLOW-UP SURVEILLANCE OF THYROID CANCER: Primary | ICD-10-CM

## 2023-05-15 RX ORDER — LEVOTHYROXINE SODIUM 137 UG/1
137 TABLET ORAL DAILY
Qty: 30 TABLET | Refills: 4 | Status: SHIPPED | OUTPATIENT
Start: 2023-05-15

## 2023-05-15 NOTE — PROGRESS NOTES
Surgical Oncology Follow Up       [facility] [address] [place] [address]  [phone]    Hossein Naveed  1970  [id number]  [address] [place] [address] [facility] [place] [address] [place] [id number] [phone]    Diagnoses and all orders for this visit:    Encounter for follow-up surveillance of thyroid cancer    Hx of thyroid cancer  -     US head neck lymph node mapping; Future        Chief Complaint   Patient presents with   • Follow-up       Return in about 6 months (around 11/15/2023) for Imaging - See orders, Office Visit with Dr Jd Barney  Oncology History   Hx of thyroid cancer   9/21/2021 Surgery    Total thyroidectomy:  - Right lobe, 4 5 cm lesion:  Grossly encapsulated, minimally invasive, follicular carcinoma; confined to thyroid  - Left lobe: Two foci of papillary microcarcinoma (4 mm and 3 mm, respectively) both confined        to thyroid  2/3/2023 - 2/3/2023 Radiation    Radioactive iodine therapy           History of Present Illness: The patient returns to the office today in follow-up for her history of thyroid cancer  She is currently HUGH at 1 1/2 years and doing well  Last year she did have some lymph nodes which appeared suspicious on imaging  Biopsy was performed, and only atypical changes were seen  After discussion with her endocrinologist, the patient decided that she wanted to undergo radioactive iodine ablation  This was performed in February 2023  The patient reports she is feeling well  She denies any difficulty swallowing or breathing, and has no voice changes  She has had blood work done in anticipation of this visit, and ultrasound of the head and neck with lymph node mapping was performed on May 1, 2023  I have reviewed these results myself and discussed them with the patient today        Review of Systems Constitutional: Negative for activity change and appetite change  HENT: Negative  Negative for trouble swallowing and voice change  Respiratory: Negative  Negative for cough and shortness of breath  Cardiovascular: Negative  Gastrointestinal: Negative  Genitourinary: Negative  Musculoskeletal: Negative  Skin: Negative  Neurological: Negative  Hematological: Negative  Negative for adenopathy  Psychiatric/Behavioral: Negative                Patient Active Problem List   Diagnosis   • Generalized osteoarthritis of multiple sites   • Acquired hypothyroidism   • Stress incontinence, female   • Surgical menopause   • Lumbar disc disease   • Chronic right-sided low back pain without sciatica   • Lumbar radiculopathy   • Hypertriglyceridemia   • Marijuana use, episodic   • Status post bariatric surgery   • Hx of thyroid cancer   • Thiamine deficiency   • History of partial gastrectomy   • Vaginal atrophy   • Encounter for follow-up surveillance of thyroid cancer   • Pilonidal disease of philip cleft   • Vitamin D deficiency     Past Medical History:   Diagnosis Date   • Abnormal Pap smear of cervix    • Cervical dysplasia     LAST ASSESSED: [id number]   • Disease of thyroid gland    • Generalized anxiety disorder 2012   • HPV (human papilloma virus) infection    • Hypothyroid    • Minimally invasive follicular carcinoma of thyroid ([facility] [address] ) 2021     Past Surgical History:   Procedure Laterality Date   • BILATERAL SALPINGOOPHORECTOMY Bilateral    • CERVICAL BIOPSY  W/ LOOP ELECTRODE EXCISION     • COLPOSCOPY     • GASTRECTOMY SLEEVE LAPAROSCOPIC     • HYSTERECTOMY       age 29   • OOPHORECTOMY Bilateral     age 29   • THYROIDECTOMY Bilateral 2021    Procedure: THYROIDECTOMY, total;  Surgeon: Leoncio Chu MD;  [facility];  Service: Surgical Oncology   • TUBAL LIGATION     • US GUIDED LYMPH NODE BIOPSY LEFT  2022   • US GUIDED THYROID BIOPSY  3/19/2021   • US GUIDED THYROID BIOPSY  6/15/2021     Family History   Problem Relation Age of Onset   • Diabetes Father    • Hypertension Father    • Diabetes Family    • Hypertension Family    • Skin cancer Family    • COPD Mother    • Breast cancer Maternal Grandmother 61   • No Known Problems Sister    • No Known Problems Daughter    • No Known Problems Maternal Grandfather    • No Known Problems Paternal Grandmother    • Diabetes type I Paternal Grandfather    • No Known Problems Maternal Aunt    • No Known Problems Maternal Aunt    • No Known Problems Paternal Aunt      Social History     Socioeconomic History   • Marital status: Single     Spouse name: Not on file   • Number of children: Not on file   • Years of education: Not on file   • Highest education level: Not on file   Occupational History   • Not on file   Tobacco Use   • Smoking status: Former     Packs/day: 0 25     Years: 2 00     Pack years: 0 50     Types: Cigarettes     Quit date: 200     Years since quittin 3   • Smokeless tobacco: Never   Vaping Use   • Vaping Use: Never used   Substance and Sexual Activity   • Alcohol use: Yes     Comment: SOCIAL   • Drug use: No     Comment: SUBSTANCE ABUSE IN THE PAST   • Sexual activity: Yes     Partners: Male     Birth control/protection: Post-menopausal, Female Sterilization   Other Topics Concern   • Not on file   Social History Narrative    USES SAFETY EQUIPMENT - [employer]     Social Determinants of Health     Financial Resource Strain: Low Risk    • Difficulty of Paying Living Expenses: Not hard at all   Food Insecurity: No Food Insecurity   • Worried About Running Out of Food in the Last Year: Never true   • Ran Out of Food in the Last Year: Never true   Transportation Needs: No Transportation Needs   • Lack of Transportation (Medical): No   • Lack of Transportation (Non-Medical):  No   Physical Activity: Not on file   Stress: Not on file   Social Connections: Not on file   Intimate Partner Violence: Not on "file   Housing Stability: Not on file       Current Outpatient Medications:   •  dicyclomine (BENTYL) 10 mg capsule, 3 times daily as needed  , Disp: 30 capsule, Rfl: 2  •  EQ Pain Reliever 500 MG tablet, TAKE 1 TABLET BY MOUTH EVERY 6 HOURS AS NEEDED FOR MILD PAIN (PAIN SCORE 1-3), Disp: , Rfl:   •  levothyroxine 125 mcg tablet, Take 1 tablet (125 mcg total) by mouth daily, Disp: 90 tablet, Rfl: 2  •  methocarbamol (ROBAXIN) 500 mg tablet, Take by mouth as needed, Disp: , Rfl:   •  mupirocin (BACTROBAN) 2 % ointment, Apply topically 3 (three) times a day, Disp: 22 g, Rfl: 0  •  Cholecalciferol (Vitamin D3) 50 MCG (2000 UT) capsule, Take 1 capsule (2,000 Units total) by mouth daily (Patient not taking: Reported on 3/21/2023), Disp: 30 capsule, Rfl: 5  Allergies   Allergen Reactions   • Ace Inhibitors Cough   • Compazine [Prochlorperazine] Other (See Comments)     \"makes me crazy\"   • Phenothiazines Confusion   • Quinolones Other (See Comments)     Patient does not remember reaction   • Sulfamethoxazole-Trimethoprim Blisters     In mouth     Vitals:    05/15/23 1046   BP: 120/70   Pulse: 62   Resp: 16   Temp: 97 7 °F (36 5 °C)       Physical Exam  Vitals reviewed  Constitutional:       General: She is not in acute distress  Appearance: Normal appearance  She is normal weight  She is not ill-appearing or toxic-appearing  HENT:      Head: Normocephalic and atraumatic  Nose: Nose normal       Mouth/Throat:      Mouth: Mucous membranes are moist    Eyes:      General: No scleral icterus  Extraocular Movements: Extraocular movements intact  Conjunctiva/sclera: Conjunctivae normal       Pupils: Pupils are equal, round, and reactive to light  Cardiovascular:      Rate and Rhythm: Normal rate  Pulmonary:      Effort: Pulmonary effort is normal    Musculoskeletal:         General: Normal range of motion  Cervical back: Normal range of motion and neck supple     Lymphadenopathy:      Cervical: No " cervical adenopathy  Skin:     General: Skin is warm and dry  Neurological:      General: No focal deficit present  Mental Status: She is alert and oriented to person, place, and time  Psychiatric:         Mood and Affect: Mood normal          Behavior: Behavior normal          Thought Content: Thought content normal          Judgment: Judgment normal            Labs:     Collected Updated Procedure    05/12/2023 0922 05/13/2023 1108 Thyroglobulin by NEY [[id number]]    (Abnormal)   Blood    Component Value Units   Thyroglobulin-NEY <0 1 Low   ng/mL          05/12/2023 0922 05/13/2023 1108 Thyroglobulin [[id number]]    Blood    Component Value Units   Thyroglobulin Ab <1 0  IU/mL          05/12/2023 0922 05/12/2023 1405 TSH, 3rd generation [[id number]]    (Abnormal)   Blood    Component Value Units   TSH 3RD GENERATON 4 600 High   uIU/mL            Imaging  US head neck lymph node mapping    Result Date: 5/3/2023  Narrative: NECK ULTRASOUND INDICATION:     Z08: Encounter for follow-up examination after completed treatment for malignant neoplasm Z85 850: Personal history of malignant neoplasm of thyroid  Status post total thyroidectomy 9/21/2021 (minimally invasive follicular carcinoma right lobe confined to thyroid; there are 2 foci of papillary microcarcinoma left lobe confined to the thyroid) COMPARISON: Ultrasound 11/7/2022; I-131 post ablation imaging 2/8/2023 FINDINGS: Ultrasound of the thyroidectomy bed and cervical lymph node chains was performed with a high frequency linear transducer  There is no suspicion of recurrent mass in the thyroidectomy bed  Stable mildly prominent right level 4 lymph node measures 11 x 5 x 3 mm, without a clearly defined fatty hilum  2 previously biopsied (benign) mildly prominent left level 4 lymph nodes are also stable in appearance measuring 9 x 4 x 4 mm and 10 x 5 x 3 mm respectively   Other bilateral cervical lymph nodes maintain normal morphologic contour, echogenicity and short axis dimensions of less than 0 7 cm  No evidence for microcalcification or focal nodularity  Impression: Overall stable appearance status post total thyroidectomy with stable bilateral cervical lymph nodes as noted above  Workstation performed: [id number]     I reviewed the above laboratory and imaging data  Discussion/Summary: This is a 80-year-old female who presents to the office today for continued thyroid cancer follow-up  At this time her thyroglobulin levels remain undetectable  Her TSH has increased significantly, and I expect that her endocrinologist will increase her levothyroxine dose  On ultrasound all lymph nodes remain stable  We will see her again in 6 months with another ultrasound  She is agreeable to the plan, all questions have been answered

## 2023-06-12 ENCOUNTER — OFFICE VISIT (OUTPATIENT)
Dept: FAMILY MEDICINE CLINIC | Facility: CLINIC | Age: 53
End: 2023-06-12
Payer: COMMERCIAL

## 2023-06-12 VITALS
HEIGHT: 66 IN | BODY MASS INDEX: 29.25 KG/M2 | SYSTOLIC BLOOD PRESSURE: 120 MMHG | WEIGHT: 182 LBS | DIASTOLIC BLOOD PRESSURE: 62 MMHG

## 2023-06-12 DIAGNOSIS — E78.1 HYPERTRIGLYCERIDEMIA: ICD-10-CM

## 2023-06-12 DIAGNOSIS — E03.9 ACQUIRED HYPOTHYROIDISM: Primary | ICD-10-CM

## 2023-06-12 DIAGNOSIS — L73.9 NASAL FOLLICULITIS: ICD-10-CM

## 2023-06-12 DIAGNOSIS — E55.9 VITAMIN D DEFICIENCY: ICD-10-CM

## 2023-06-12 DIAGNOSIS — Z98.84 STATUS POST BARIATRIC SURGERY: ICD-10-CM

## 2023-06-12 DIAGNOSIS — Z85.850 HX OF THYROID CANCER: ICD-10-CM

## 2023-06-12 PROBLEM — F12.90 MARIJUANA USE, EPISODIC: Status: RESOLVED | Noted: 2019-08-19 | Resolved: 2023-06-12

## 2023-06-12 PROBLEM — L08.89 PILONIDAL DISEASE OF NATAL CLEFT: Status: RESOLVED | Noted: 2022-06-13 | Resolved: 2023-06-12

## 2023-06-12 PROCEDURE — 99214 OFFICE O/P EST MOD 30 MIN: CPT | Performed by: FAMILY MEDICINE

## 2023-06-12 RX ORDER — ACETAMINOPHEN 160 MG
2000 TABLET,DISINTEGRATING ORAL DAILY
Qty: 30 CAPSULE | Refills: 5 | Status: SHIPPED | OUTPATIENT
Start: 2023-06-12

## 2023-06-12 NOTE — PROGRESS NOTES
Chief Complaint   Patient presents with   • Hypothyroidism     No refills needed      Name: Kaylyn King      : 1970      MRN: [id number]  Encounter Provider: Simona Thurman DO  Encounter Date: 2023   Encounter department: [facility] PRIMARY CARE    Assessment & Plan     1  Acquired hypothyroidism  Assessment & Plan:  Follow-up with endocrinology, continue levothyroxine 137 mcg daily  2  Hx of thyroid cancer  Assessment & Plan:  Follow-up with oncologic surgery and endocrinology  3  Hypertriglyceridemia  Assessment & Plan:  Diet controlled, recheck 6 months      4  Status post bariatric surgery  Assessment & Plan:  Diet and exercise discussed      5  Vitamin D deficiency  Assessment & Plan:  Recommend 2000 units of vitamin D per day, recheck in 3-6 months  Orders:  -     Vitamin D 25 hydroxy; Future  -     Cholecalciferol (Vitamin D3) 50 MCG (2000 UT) capsule; Take 1 capsule (2,000 Units total) by mouth daily    6  Nasal folliculitis    BMI Counseling: Body mass index is 29 38 kg/m²  The BMI is above normal  Nutrition recommendations include decreasing portion sizes, encouraging healthy choices of fruits and vegetables, moderation in carbohydrate intake, increasing intake of lean protein and reducing intake of saturated and trans fat  Exercise recommendations include exercising 3-5 times per week  Rationale for BMI follow-up plan is due to patient being overweight or obese  Subjective      She presents to the office for follow-up on her hypothyroidism, hypertriglyceridemia, vitamin D deficiency, status post bariatric surgery, status post thyroid cancer  Overall she is doing fairly well  She is up-to-date with mammogram and colonoscopy  Review of Systems   Constitutional: Negative for activity change, chills, diaphoresis, fatigue, fever and unexpected weight change     HENT: Negative for congestion, ear pain, hearing loss, nosebleeds, postnasal drip, rhinorrhea, sinus pressure, sore throat and tinnitus  Eyes: Negative for photophobia, pain, discharge, redness and visual disturbance  Respiratory: Negative for cough, chest tightness, shortness of breath and wheezing  Cardiovascular: Negative for chest pain, palpitations and leg swelling  Denies ARRIOLA   Gastrointestinal: Negative for abdominal pain, blood in stool, constipation, diarrhea, nausea and vomiting  Endocrine: Negative  Genitourinary: Negative for difficulty urinating, dysuria, frequency, hematuria and urgency  Musculoskeletal: Negative for arthralgias, joint swelling and myalgias  Skin: Negative for rash and wound  Denies skin lesions, change in birthmarks   Allergic/Immunologic: Negative for environmental allergies, food allergies and immunocompromised state  Neurological: Negative for dizziness, tremors, seizures, syncope, weakness, numbness and headaches  Hematological: Negative  Psychiatric/Behavioral: Negative for behavioral problems, confusion, decreased concentration and sleep disturbance  The patient is not nervous/anxious  Current Outpatient Medications on File Prior to Visit   Medication Sig   • dicyclomine (BENTYL) 10 mg capsule 3 times daily as needed     • levothyroxine 137 mcg tablet Take 1 tablet (137 mcg total) by mouth daily   • [DISCONTINUED] Cholecalciferol (Vitamin D3) 50 MCG (2000 UT) capsule Take 1 capsule (2,000 Units total) by mouth daily (Patient not taking: Reported on 3/21/2023)   • [DISCONTINUED] EQ Pain Reliever 500 MG tablet TAKE 1 TABLET BY MOUTH EVERY 6 HOURS AS NEEDED FOR MILD PAIN (PAIN SCORE 1-3) (Patient not taking: Reported on 6/12/2023)   • [DISCONTINUED] methocarbamol (ROBAXIN) 500 mg tablet Take by mouth as needed (Patient not taking: Reported on 6/12/2023)   • [DISCONTINUED] mupirocin (BACTROBAN) 2 % ointment Apply topically 3 (three) times a day (Patient not taking: Reported on 6/12/2023)       Objective     /62 "Ht 5' 6\" (1 676 m)   Wt 82 6 kg (182 lb)   BMI 29 38 kg/m²     Physical Exam  Constitutional:       Appearance: She is well-developed  HENT:      Head: Normocephalic and atraumatic  Right Ear: Tympanic membrane and ear canal normal       Left Ear: Tympanic membrane and ear canal normal       Nose: Nose normal    Eyes:      Conjunctiva/sclera: Conjunctivae normal       Pupils: Pupils are equal, round, and reactive to light  Neck:      Thyroid: No thyromegaly  Vascular: No JVD  Trachea: No tracheal deviation  Cardiovascular:      Rate and Rhythm: Normal rate and regular rhythm  Heart sounds: No murmur heard  Pulmonary:      Effort: Pulmonary effort is normal       Breath sounds: Normal breath sounds  No wheezing or rales  Abdominal:      General: Bowel sounds are normal       Palpations: Abdomen is soft  There is no mass  Tenderness: There is no abdominal tenderness  There is no guarding or rebound  Musculoskeletal:         General: No tenderness or deformity  Cervical back: Normal range of motion and neck supple  Lymphadenopathy:      Cervical: No cervical adenopathy  Skin:     General: Skin is warm and dry  Capillary Refill: Capillary refill takes less than 2 seconds  Findings: No lesion or rash  Nails: There is no clubbing  Neurological:      General: No focal deficit present  Mental Status: She is alert and oriented to person, place, and time  Cranial Nerves: No cranial nerve deficit  Motor: No abnormal muscle tone  Coordination: Coordination normal       Deep Tendon Reflexes: Reflexes are normal and symmetric   Reflexes normal    Psychiatric:         Mood and Affect: Mood normal          Judgment: Judgment normal        Alem Schwartz DO  "

## 2023-06-29 ENCOUNTER — APPOINTMENT (OUTPATIENT)
Dept: LAB | Facility: CLINIC | Age: 53
End: 2023-06-29
Payer: COMMERCIAL

## 2023-06-29 DIAGNOSIS — Z85.850 HX OF THYROID CANCER: ICD-10-CM

## 2023-06-29 DIAGNOSIS — E03.9 ACQUIRED HYPOTHYROIDISM: ICD-10-CM

## 2023-06-29 DIAGNOSIS — E55.9 VITAMIN D DEFICIENCY: ICD-10-CM

## 2023-06-29 LAB
25(OH)D3 SERPL-MCNC: 24.5 NG/ML (ref 30–100)
T4 FREE SERPL-MCNC: 1.3 NG/DL (ref 0.61–1.12)
TSH SERPL DL<=0.05 MIU/L-ACNC: 1.14 UIU/ML (ref 0.45–4.5)

## 2023-06-29 PROCEDURE — 36415 COLL VENOUS BLD VENIPUNCTURE: CPT

## 2023-06-29 PROCEDURE — 84439 ASSAY OF FREE THYROXINE: CPT

## 2023-06-29 PROCEDURE — 84443 ASSAY THYROID STIM HORMONE: CPT

## 2023-06-29 PROCEDURE — 82306 VITAMIN D 25 HYDROXY: CPT

## 2023-06-30 DIAGNOSIS — E03.9 ACQUIRED HYPOTHYROIDISM: ICD-10-CM

## 2023-06-30 DIAGNOSIS — Z85.850 HX OF THYROID CANCER: Primary | ICD-10-CM

## 2023-08-14 NOTE — TELEPHONE ENCOUNTER
----- Message from Ba sent at 1/5/2022  9:41 AM EST -----  Regarding: FW: Headach  This pt needs to call her PCP, H/A is one of the Covid symptoms  ----- Message -----  From: Oral Dural, MA  Sent: 1/5/2022   9:27 AM EST  To: CHRIS Galvan  Subject: FW: Nakia Eubanks                                        ----- Message -----  From: Sherri Killian  Sent: 1/5/2022   8:38 AM EST  To:  [id number]  Subject: Jonathan Showavril had a headache for like a week or so now, but along with this headache when I'm having sex and getting ready to orgasm the headache gets very intense like excruciating pain on the left side of my head behind my eat area  I feel like it's getting worse cause now the headaches are sticking around longer and hurting more  Of course I Google it that's why I'm writing this email lol, not sure what we can do about this or if this is something that's gonna pass  In the meantime Tylenol dulls the pain but isn't taking it away anymore 
How Severe Is Your Psoriasis?: mild
Do You Have A Family History Of Psoriasis?: yes
Is This A New Presentation, Or A Follow-Up?: Psoriasis

## 2023-09-30 DIAGNOSIS — Z85.850 HX OF THYROID CANCER: ICD-10-CM

## 2023-09-30 DIAGNOSIS — E03.9 ACQUIRED HYPOTHYROIDISM: ICD-10-CM

## 2023-10-02 RX ORDER — LEVOTHYROXINE SODIUM 137 UG/1
137 TABLET ORAL DAILY
Qty: 30 TABLET | Refills: 0 | Status: SHIPPED | OUTPATIENT
Start: 2023-10-02

## 2023-10-03 ENCOUNTER — OFFICE VISIT (OUTPATIENT)
Dept: FAMILY MEDICINE CLINIC | Facility: CLINIC | Age: 53
End: 2023-10-03
Payer: COMMERCIAL

## 2023-10-03 VITALS
HEART RATE: 57 BPM | SYSTOLIC BLOOD PRESSURE: 122 MMHG | DIASTOLIC BLOOD PRESSURE: 80 MMHG | HEIGHT: 66 IN | OXYGEN SATURATION: 99 % | WEIGHT: 182.6 LBS | BODY MASS INDEX: 29.35 KG/M2

## 2023-10-03 DIAGNOSIS — K03.0: ICD-10-CM

## 2023-10-03 DIAGNOSIS — S03.00XA TMJ (DISLOCATION OF TEMPOROMANDIBULAR JOINT), INITIAL ENCOUNTER: Primary | ICD-10-CM

## 2023-10-03 PROCEDURE — 99213 OFFICE O/P EST LOW 20 MIN: CPT | Performed by: FAMILY MEDICINE

## 2023-10-03 NOTE — PROGRESS NOTES
Name: Blessing Bear      : 1970      MRN: [id number]  Encounter Provider: Gavin Mena DO  Encounter Date: 10/3/2023   Encounter department: [facility]IMARY [facility]ARE    Assessment & Plan     1. TMJ (dislocation of temporomandibular joint), initial encounter  -     Ambulatory Referral to Oral Maxillofacial Surgery; Future    2. Excessive dental attrition  -     Ambulatory Referral to Oral Maxillofacial Surgery; Future          Chief Complaint   Patient presents with   • Temporomandibular Joint Pain       Subjective      She presents to the office complaining of bilateral jaw pain with clicking and popping for years. She used to wear a mouthguard, not sure if that helped she does grind her teeth. She saw her dentist the other day who recommended she see me about possibly getting a referral.    Review of Systems   Constitutional: Negative for chills and fever. HENT: Negative for ear pain and sore throat. You are clicking, popping, chronic pain. Dentist dislocated her jaw 1 time pulling the tooth. Eyes: Negative for pain and visual disturbance. Respiratory: Negative for cough and shortness of breath. Cardiovascular: Negative for chest pain and palpitations. Gastrointestinal: Negative for abdominal pain and vomiting. Genitourinary: Negative for dysuria and hematuria. Musculoskeletal: Positive for joint swelling (Bilateral TMJ). Negative for arthralgias and back pain. Skin: Negative for color change and rash. Neurological: Negative for seizures and syncope. All other systems reviewed and are negative. Current Outpatient Medications on File Prior to Visit   Medication Sig   • Cholecalciferol (Vitamin D3) 50 MCG (2000 UT) capsule Take 1 capsule (2,000 Units total) by mouth daily   • dicyclomine (BENTYL) 10 mg capsule 3 times daily as needed.    • levothyroxine 137 mcg tablet Take 1 tablet by mouth once daily       Objective     /80 (BP Location: Left arm, Patient Position: Sitting, Cuff Size: Adult)   Pulse 57   Ht 5' 6" (1.676 m)   Wt 82.8 kg (182 lb 9.6 oz)   SpO2 99%   BMI 29.47 kg/m²     Physical Exam  Vitals and nursing note reviewed. Constitutional:       General: She is not in acute distress. Appearance: Normal appearance. She is well-developed. HENT:      Head: Normocephalic and atraumatic. Mouth/Throat:      Dentition: Abnormal dentition. Dental caries present. Tonsils: No tonsillar exudate or tonsillar abscesses. Comments: Bilateral clicking/popping of the TMJs with opening and closing. Eyes:      Conjunctiva/sclera: Conjunctivae normal.   Pulmonary:      Effort: Pulmonary effort is normal.   Neurological:      Mental Status: She is alert and oriented to person, place, and time.    Psychiatric:         Judgment: Judgment normal.       Page Query, DO

## 2023-10-26 ENCOUNTER — LAB (OUTPATIENT)
Dept: LAB | Facility: CLINIC | Age: 53
End: 2023-10-26
Payer: COMMERCIAL

## 2023-10-26 DIAGNOSIS — Z85.850 HX OF THYROID CANCER: ICD-10-CM

## 2023-10-26 DIAGNOSIS — E03.9 ACQUIRED HYPOTHYROIDISM: ICD-10-CM

## 2023-10-26 LAB — TSH SERPL DL<=0.05 MIU/L-ACNC: 0.75 UIU/ML (ref 0.45–4.5)

## 2023-10-26 PROCEDURE — 86800 THYROGLOBULIN ANTIBODY: CPT

## 2023-10-26 PROCEDURE — 84443 ASSAY THYROID STIM HORMONE: CPT

## 2023-10-26 PROCEDURE — 36415 COLL VENOUS BLD VENIPUNCTURE: CPT

## 2023-10-26 PROCEDURE — 84432 ASSAY OF THYROGLOBULIN: CPT

## 2023-10-27 LAB
THYROGLOB AB SERPL-ACNC: <1 IU/ML (ref 0–0.9)
THYROGLOB SERPL-MCNC: <0.1 NG/ML (ref 1.5–38.5)

## 2023-10-30 ENCOUNTER — OFFICE VISIT (OUTPATIENT)
Dept: ENDOCRINOLOGY | Facility: CLINIC | Age: 53
End: 2023-10-30
Payer: COMMERCIAL

## 2023-10-30 VITALS
HEIGHT: 66 IN | HEART RATE: 72 BPM | WEIGHT: 186.4 LBS | SYSTOLIC BLOOD PRESSURE: 110 MMHG | BODY MASS INDEX: 29.96 KG/M2 | DIASTOLIC BLOOD PRESSURE: 68 MMHG

## 2023-10-30 DIAGNOSIS — E03.9 ACQUIRED HYPOTHYROIDISM: ICD-10-CM

## 2023-10-30 DIAGNOSIS — E55.9 VITAMIN D DEFICIENCY: Primary | ICD-10-CM

## 2023-10-30 DIAGNOSIS — Z85.850 HX OF THYROID CANCER: ICD-10-CM

## 2023-10-30 PROCEDURE — 99214 OFFICE O/P EST MOD 30 MIN: CPT | Performed by: PHYSICIAN ASSISTANT

## 2023-10-30 RX ORDER — LEVOTHYROXINE SODIUM 137 UG/1
137 TABLET ORAL DAILY
Qty: 90 TABLET | Refills: 3 | Status: SHIPPED | OUTPATIENT
Start: 2023-10-30

## 2023-10-30 NOTE — PROGRESS NOTES
Established Patient Progress Note       Chief Complaint   Patient presents with   • Thyroid Cancer   • Hypothyroidism   • Vitamin D Deficiency        Impression & Plan:    Problem List Items Addressed This Visit        Endocrine    Acquired hypothyroidism     TSH at goal of 0.5 to 2.0. Continue levothyroxine 137mcg daily. Relevant Medications    levothyroxine 137 mcg tablet    Other Relevant Orders    TSH, 3rd generation    T4, free       Other    Hx of thyroid cancer     No evidence of recurrence based on undetectable thyroglobulin and stable 5/2023 ultrasound. She has repeat ultrasound and surgical oncology follow up visit soon. Relevant Medications    levothyroxine 137 mcg tablet    Other Relevant Orders    Thyroglobulin    Vitamin D deficiency - Primary     Advised her to take supplements daily. Relevant Orders    Vitamin D 25 hydroxy         Orders Placed This Encounter   Procedures   • TSH, 3rd generation     This is a patient instruction: This test is non-fasting. Please drink two glasses of water morning of bloodwork. Standing Status:   Future     Standing Expiration Date:   10/30/2024   • Thyroglobulin     Standing Status:   Future     Standing Expiration Date:   10/30/2024   • T4, free     Standing Status:   Future     Standing Expiration Date:   10/30/2024   • Vitamin D 25 hydroxy     Standing Status:   Future     Standing Expiration Date:   10/30/2024         History of Present Illness:     Stephane Potter is a 47 y.o. female with a history of  thyroid cancer, post-surgical hypothyroidism, and Vitamin D Deficiency. She was last seen by Dr. Reyna Person 4/24/2023 and denies any problems since that time. For the Thyroid Cancer, she had thyroidectomy 9/21/2021. See Pathology report below. Per Dr. Huntley Lung note on 4/14/2022 "On final path, the left nodule was a minimally invasive FTC without extension. Path is 4.5x3. 9x2.8cm, pT3a. No lymphovascular invasion was noted. There are two areas of micropapillary CA (3mm and 4mm in the right lobe). No concerned nodes. Background of CLT is noted, but her TG abs are negative". She had lymph node mapping ultrasound 4/11/2022 which showed 2 suspicious looking lymph nodes on the left side of the neck. FNA of these nodes were performed. The left sided lymph node in zone 3 did show atypically cellular changes but thyroglobulin testing was undetectable. The left zone 4 lymph node was negative for malignancy and thyroglobulin testing was undetectable. Due to low risk of recurrence, I-131 therapy was not reccommended. However, she was concerned about risk of recurrence due to ultrasound abnormalities and  previous inaccurate biopsies and decided to have I-131 therapy. Thyrogen stimulated I-131 therapy  was performed 2/8/2023 with 49. 7mCi of I-131. Pre-Therapy scan did not show any uptake, post therapy scan did show foci of uptake in the right thyroid bed. Most recent ultrasound completed 5/1/2023 showed stable mildly prominent lymph nodes. Repeat testing is scheduled soon. For hypothyroidism, she is taking levothyroxine 137mcg daily. Her TSH goal is 0.5 to 2.0. She is feeling well today with no complaints. For the Vitamin D Deficiency, she is taking 2,000 units per day but sometimes misses.        Patient Active Problem List   Diagnosis   • Generalized osteoarthritis of multiple sites   • Acquired hypothyroidism   • Stress incontinence, female   • Surgical menopause   • Lumbar disc disease   • Chronic right-sided low back pain without sciatica   • Lumbar radiculopathy   • Hypertriglyceridemia   • Status post bariatric surgery   • Hx of thyroid cancer   • Thiamine deficiency   • History of partial gastrectomy   • Vaginal atrophy   • Encounter for follow-up surveillance of thyroid cancer   • Vitamin D deficiency      Past Medical History:   Diagnosis Date   • Abnormal Pap smear of cervix    • Arthritis    • Cervical dysplasia     LAST ASSESSED: [id number]   • Disease of thyroid gland    • Generalized anxiety disorder 2012   • HPV (human papilloma virus) infection    • Hypothyroid    • Minimally invasive follicular carcinoma of thyroid ([address]) 2021   • Pilonidal disease of  cleft 2022      Past Surgical History:   Procedure Laterality Date   • BILATERAL SALPINGOOPHORECTOMY Bilateral    • CERVICAL BIOPSY  W/ LOOP ELECTRODE EXCISION     • CHOLECYSTECTOMY     • COLPOSCOPY     • GASTRECTOMY SLEEVE LAPAROSCOPIC     • HYSTERECTOMY       age 29   • LYMPH NODE BIOPSY     • OOPHORECTOMY Bilateral     age 29   • THYROIDECTOMY Bilateral 2021    Procedure: THYROIDECTOMY, total;  Surgeon: Rachel Churchill MD;  Location: [facility];  Service: Surgical Oncology   • TUBAL LIGATION     • US GUIDED LYMPH NODE BIOPSY LEFT  2022   • US GUIDED THYROID BIOPSY  2021   • US GUIDED THYROID BIOPSY  06/15/2021      Family History   Problem Relation Age of Onset   • Diabetes Father    • Hypertension Father    • Diabetes Family    • Hypertension Family    • Skin cancer Family    • COPD Mother    • Alcohol abuse Mother    • Substance Abuse Mother    • Mental illness Mother    • Depression Mother    • Coronary artery disease Mother    • Asthma Mother    • Anxiety disorder Mother    • Bipolar disorder Mother    • Drug abuse Mother    • Psychiatric Illness Mother    • Schizophrenia Mother    • Suicide Attempts Mother    • Breast cancer Maternal Grandmother 61   • No Known Problems Sister    • No Known Problems Daughter    • No Known Problems Maternal Grandfather    • No Known Problems Paternal Grandmother    • Diabetes type I Paternal Grandfather    • No Known Problems Maternal Aunt    • No Known Problems Maternal Aunt    • No Known Problems Paternal Aunt      Social History     Tobacco Use   • Smoking status: Former     Packs/day: 0.25     Years: 2.00     Total pack years: 0.50     Types: Cigarettes     Quit date: 1990     Years since quittin.8   • Smokeless tobacco: Never   Substance Use Topics   • Alcohol use: Yes     Comment: SOCIAL     Allergies   Allergen Reactions   • Ace Inhibitors Cough   • Compazine [Prochlorperazine] Other (See Comments)     "makes me crazy"   • Phenothiazines Confusion   • Quinolones Other (See Comments)     Patient does not remember reaction   • Sulfamethoxazole-Trimethoprim Blisters     In mouth       Current Outpatient Medications:   •  Cholecalciferol (Vitamin D3) 50 MCG (2000 UT) capsule, Take 1 capsule (2,000 Units total) by mouth daily, Disp: 30 capsule, Rfl: 5  •  levothyroxine 137 mcg tablet, Take 1 tablet (137 mcg total) by mouth daily, Disp: 90 tablet, Rfl: 3    Review of Systems   Constitutional:  Negative for activity change, appetite change, chills, diaphoresis, fatigue, fever and unexpected weight change. HENT:  Negative for trouble swallowing and voice change. Eyes:  Negative for visual disturbance. Respiratory:  Negative for shortness of breath. Cardiovascular:  Negative for chest pain and palpitations. Gastrointestinal:  Negative for abdominal pain, constipation and diarrhea. Endocrine: Negative for cold intolerance, heat intolerance, polydipsia, polyphagia and polyuria. Genitourinary:  Negative for frequency and menstrual problem. Musculoskeletal:  Negative for arthralgias and myalgias. Skin:  Negative for rash. Allergic/Immunologic: Negative for food allergies. Neurological:  Negative for dizziness and tremors. Hematological:  Negative for adenopathy. Psychiatric/Behavioral:  Negative for sleep disturbance. All other systems reviewed and are negative. Physical Exam:  Body mass index is 30.09 kg/m².   /68   Pulse 72   Ht 5' 6" (1.676 m)   Wt 84.6 kg (186 lb 6.4 oz)   BMI 30.09 kg/m²    Wt Readings from Last 3 Encounters:   10/30/23 84.6 kg (186 lb 6.4 oz)   10/03/23 82.8 kg (182 lb 9.6 oz)   23 82.6 kg (182 lb) Physical Exam  Vitals reviewed. Constitutional:       General: She is not in acute distress. Appearance: Normal appearance. She is well-developed. She is not toxic-appearing. HENT:      Head: Normocephalic and atraumatic. Eyes:      Conjunctiva/sclera: Conjunctivae normal.      Pupils: Pupils are equal, round, and reactive to light. Neck:      Thyroid: No thyromegaly. Cardiovascular:      Rate and Rhythm: Normal rate and regular rhythm. Heart sounds: Normal heart sounds. Pulmonary:      Effort: Pulmonary effort is normal. No respiratory distress. Breath sounds: Normal breath sounds. No wheezing or rales. Abdominal:      General: Bowel sounds are normal. There is no distension. Palpations: Abdomen is soft. Tenderness: There is no abdominal tenderness. Musculoskeletal:         General: Normal range of motion. Cervical back: Normal range of motion and neck supple. Skin:     General: Skin is warm and dry. Neurological:      Mental Status: She is alert and oriented to person, place, and time. Psychiatric:         Mood and Affect: Mood normal.         Behavior: Behavior normal.         Labs:     Lab Results   Component Value Date    CREATININE 0.86 10/10/2022    CREATININE 0.87 11/02/2021    CREATININE 0.82 09/07/2021    BUN 15 10/10/2022     (L) 04/22/2014    K 3.6 10/10/2022     10/10/2022    CO2 28 10/10/2022     eGFR   Date Value Ref Range Status   10/10/2022 78 ml/min/1.73sq m Final       Lab Results   Component Value Date    CHOL 190 04/22/2014    HDL 82 10/10/2022    TRIG 76 10/10/2022       Lab Results   Component Value Date    ALT 25 10/10/2022    AST 22 10/10/2022    ALKPHOS 64 10/10/2022    BILITOT 0.3 04/22/2014       Lab Results   Component Value Date    FREET4 1.30 (H) 06/29/2023       There are no Patient Instructions on file for this visit. Discussed with the patient and all questioned fully answered.  She will call me if any problems arise.    Follow-up appointment in 6 months.      Counseled patient on diagnostic results, prognosis, risk and benefit of treatment options, instruction for management, importance of treatment compliance, Risk  factor reduction and impressions    Ricardo Johns PA-C

## 2023-10-30 NOTE — ASSESSMENT & PLAN NOTE
No evidence of recurrence based on undetectable thyroglobulin and stable 5/2023 ultrasound. She has repeat ultrasound and surgical oncology follow up visit soon.

## 2023-11-06 ENCOUNTER — HOSPITAL ENCOUNTER (OUTPATIENT)
Dept: ULTRASOUND IMAGING | Facility: HOSPITAL | Age: 53
Discharge: HOME | End: 2023-11-06
Payer: COMMERCIAL

## 2023-11-06 DIAGNOSIS — Z85.850 HX OF THYROID CANCER: ICD-10-CM

## 2023-11-06 PROCEDURE — 76536 US EXAM OF HEAD AND NECK: CPT

## 2023-11-14 ENCOUNTER — OFFICE VISIT (OUTPATIENT)
Dept: SURGICAL ONCOLOGY | Facility: CLINIC | Age: 53
End: 2023-11-14
Payer: COMMERCIAL

## 2023-11-14 VITALS
SYSTOLIC BLOOD PRESSURE: 116 MMHG | OXYGEN SATURATION: 98 % | HEART RATE: 74 BPM | HEIGHT: 66 IN | RESPIRATION RATE: 18 BRPM | WEIGHT: 185 LBS | DIASTOLIC BLOOD PRESSURE: 78 MMHG | BODY MASS INDEX: 29.73 KG/M2 | TEMPERATURE: 96.8 F

## 2023-11-14 DIAGNOSIS — Z85.850 HISTORY OF THYROID CANCER: ICD-10-CM

## 2023-11-14 DIAGNOSIS — Z85.850 ENCOUNTER FOR FOLLOW-UP SURVEILLANCE OF THYROID CANCER: Primary | ICD-10-CM

## 2023-11-14 DIAGNOSIS — Z08 ENCOUNTER FOR FOLLOW-UP SURVEILLANCE OF THYROID CANCER: Primary | ICD-10-CM

## 2023-11-14 PROCEDURE — 99214 OFFICE O/P EST MOD 30 MIN: CPT | Performed by: SURGERY

## 2023-11-14 NOTE — LETTER
November 14, 2023     Erma Saba DO    Patient: Stephane Potter   YOB: 1970   Date of Visit: 11/14/2023       Dear Dr. Piedad Deleon: Thank you for referring Yasmany Martinez to me for evaluation. Below are my notes for this consultation. If you have questions, please do not hesitate to call me. I look forward to following your patient along with you. Sincerely,        Jeremías Garcia MD        CC: MD Amber Foster PA-C Gabe Fila, MD  11/14/2023 10:40 AM  Signed     Surgical Oncology Follow Up       [facility] [place] [address] [address] [facility] [address]  [address] A Valentino Nak  1970  [id number]  [address] [facility] [place] [address] [address] [facility] [address] [phone]    Chief Complaint   Patient presents with   • Follow-up       Assessment/Plan:    No problem-specific Assessment & Plan notes found for this encounter. Diagnoses and all orders for this visit:    Encounter for follow-up surveillance of thyroid cancer    Hx of thyroid cancer      Advance Care Planning/Advance Directives:  Discussed disease status, cancer treatment plans and/or cancer treatment goals with the patient. Oncology History   Hx of thyroid cancer   9/21/2021 Surgery    Total thyroidectomy:  - Right lobe, 4.5 cm lesion:  Grossly encapsulated, minimally invasive, follicular carcinoma; confined to thyroid. - Left lobe: Two foci of papillary microcarcinoma (4 mm and 3 mm, respectively) both confined        to thyroid. 2/3/2023 - 2/3/2023 Radiation    Radioactive iodine therapy         History of Present Illness: Patient is a 61-year-old woman with history of right-sided thyroid cancer status post total thyroidectomy 2 years ago, followed by radioactive iodine therapy.   She is here for surveillance.  -Interval History: Blood work and ultrasound done in anticipation of today's visit. Review of Systems:  Review of Systems   Constitutional: Negative. HENT: Negative. Eyes: Negative. Respiratory: Negative. Cardiovascular: Negative. Gastrointestinal: Negative. Endocrine: Negative. Genitourinary: Negative. Musculoskeletal: Negative. Skin: Negative. Allergic/Immunologic: Negative. Neurological: Negative. Hematological: Negative. Psychiatric/Behavioral: Negative. All other systems reviewed and are negative.       Patient Active Problem List   Diagnosis   • Generalized osteoarthritis of multiple sites   • Acquired hypothyroidism   • Stress incontinence, female   • Surgical menopause   • Lumbar disc disease   • Chronic right-sided low back pain without sciatica   • Lumbar radiculopathy   • Hypertriglyceridemia   • Status post bariatric surgery   • Hx of thyroid cancer   • Thiamine deficiency   • History of partial gastrectomy   • Vaginal atrophy   • Encounter for follow-up surveillance of thyroid cancer   • Vitamin D deficiency     Past Medical History:   Diagnosis Date   • Abnormal Pap smear of cervix    • Arthritis    • Cervical dysplasia     LAST ASSESSED: [id number]   • Disease of thyroid gland    • Generalized anxiety disorder 2012   • HPV (human papilloma virus) infection    • Hypothyroid    • Minimally invasive follicular carcinoma of thyroid ([address]) 2021   • Pilonidal disease of philip cleft 2022     Past Surgical History:   Procedure Laterality Date   • BILATERAL SALPINGOOPHORECTOMY Bilateral    • CERVICAL BIOPSY  W/ LOOP ELECTRODE EXCISION     • CHOLECYSTECTOMY     • COLPOSCOPY     • GASTRECTOMY SLEEVE LAPAROSCOPIC     • HYSTERECTOMY       age 29   • LYMPH NODE BIOPSY     • OOPHORECTOMY Bilateral     age 29   • THYROIDECTOMY Bilateral 2021    Procedure: THYROIDECTOMY, total;  Surgeon: Manuel Montejo MD;  Location: [facility];  Service: Surgical Oncology   • TUBAL LIGATION     • US GUIDED LYMPH NODE BIOPSY LEFT  2022   • US GUIDED THYROID BIOPSY  2021   • [address] THYROID BIOPSY  06/15/2021     Family History   Problem Relation Age of Onset   • Diabetes Father    • Hypertension Father    • Diabetes Family    • Hypertension Family    • Skin cancer Family    • COPD Mother    • Alcohol abuse Mother    • Substance Abuse Mother    • Mental illness Mother    • Depression Mother    • Coronary artery disease Mother    • Asthma Mother    • Anxiety disorder Mother    • Bipolar disorder Mother    • Drug abuse Mother    • Psychiatric Illness Mother    • Schizophrenia Mother    • Suicide Attempts Mother    • Breast cancer Maternal Grandmother 61   • No Known Problems Sister    • No Known Problems Daughter    • No Known Problems Maternal Grandfather    • No Known Problems Paternal Grandmother    • Diabetes type I Paternal Grandfather    • No Known Problems Maternal Aunt    • No Known Problems Maternal Aunt    • No Known Problems Paternal Aunt      Social History     Socioeconomic History   • Marital status: Single     Spouse name: Not on file   • Number of children: Not on file   • Years of education: Not on file   • Highest education level: Not on file   Occupational History   • Not on file   Tobacco Use   • Smoking status: Former     Packs/day: 0.25     Years: 2.00     Total pack years: 0.50     Types: Cigarettes     Quit date: 1990     Years since quittin.8   • Smokeless tobacco: Never   Vaping Use   • Vaping Use: Never used   Substance and Sexual Activity   • Alcohol use: Yes     Comment: SOCIAL   • Drug use: No     Comment: SUBSTANCE ABUSE IN THE PAST   • Sexual activity: Yes     Partners: Male     Birth control/protection: Post-menopausal, Female Sterilization   Other Topics Concern   • Not on file   Social History Narrative    USES SAFETY EQUIPMENT - [employer]     Social Determinants of Health     Financial Resource Strain: Low Risk  (3/21/2023) Overall Financial Resource Strain (CARDIA)    • Difficulty of Paying Living Expenses: Not hard at all   Food Insecurity: No Food Insecurity (3/21/2023)    Hunger Vital Sign    • Worried About Running Out of Food in the Last Year: Never true    • Ran Out of Food in the Last Year: Never true   Transportation Needs: No Transportation Needs (3/21/2023)    PRAPARE - Transportation    • Lack of Transportation (Medical): No    • Lack of Transportation (Non-Medical): No   Physical Activity: Not on file   Stress: Not on file   Social Connections: Not on file   Intimate Partner Violence: Not on file   Housing Stability: Low Risk  (8/9/2021)    Housing Stability Vital Sign    • Unable to Pay for Housing in the Last Year: No    • Number of Places Lived in the Last Year: 1    • Unstable Housing in the Last Year: No       Current Outpatient Medications:   •  Cholecalciferol (Vitamin D3) 50 MCG (2000 UT) capsule, Take 1 capsule (2,000 Units total) by mouth daily, Disp: 30 capsule, Rfl: 5  •  levothyroxine 137 mcg tablet, Take 1 tablet (137 mcg total) by mouth daily, Disp: 90 tablet, Rfl: 3  Allergies   Allergen Reactions   • Ace Inhibitors Cough   • Compazine [Prochlorperazine] Other (See Comments)     "makes me crazy"   • Phenothiazines Confusion   • Quinolones Other (See Comments)     Patient does not remember reaction   • Sulfamethoxazole-Trimethoprim Blisters     In mouth     Vitals:    11/14/23 1013   BP: 116/78   Pulse: 74   Resp: 18   Temp: (!) 96.8 °F (36 °C)   SpO2: 98%       Physical Exam  Vitals reviewed. Constitutional:       Appearance: Normal appearance. HENT:      Head: Normocephalic and atraumatic. Right Ear: External ear normal.      Left Ear: External ear normal.      Nose: Nose normal.   Eyes:      Extraocular Movements: Extraocular movements intact. Pupils: Pupils are equal, round, and reactive to light. Cardiovascular:      Rate and Rhythm: Normal rate and regular rhythm.       Pulses: Normal pulses. Heart sounds: Normal heart sounds. Pulmonary:      Effort: Pulmonary effort is normal.      Breath sounds: Normal breath sounds. Abdominal:      General: Abdomen is flat. Palpations: Abdomen is soft. Musculoskeletal:         General: Normal range of motion. Cervical back: Normal range of motion and neck supple. No rigidity or tenderness. Lymphadenopathy:      Cervical: No cervical adenopathy. Skin:     General: Skin is warm and dry. Neurological:      General: No focal deficit present. Mental Status: She is alert and oriented to person, place, and time. Psychiatric:         Mood and Affect: Mood normal.         Behavior: Behavior normal.         Thought Content: Thought content normal.         Judgment: Judgment normal.           Results:  Labs:  Component  Ref Range & Units 10/26/23  9:48 AM 5/12/23  9:22 AM 2/3/23  2:12 PM 10/10/22 10:30 AM   Thyroglobulin-NEY  1.5 - 38.5 ng/mL <0.1        Lab Results   Component Value Date    OZX1JGWVKCYM 0.748 10/26/2023    TSH 1.38 06/14/2021    V7XOYML 11.3 05/12/2023         Imaging  US head neck lymph node mapping    Result Date: 11/12/2023  Narrative: NECK ULTRASOUND INDICATION:     Z85.850: Personal history of malignant neoplasm of thyroid. COMPARISON: 5/1/2023 FINDINGS: Ultrasound of the thyroidectomy bed and cervical lymph node chains was performed with a high frequency linear transducer. There is no suspicion of recurrent mass in the thyroidectomy bed. Lymph nodes maintain normal morphologic contour, echogenicity and short axis dimensions of less than 0.7 cm. No evidence for microcalcification or focal nodularity. Impression: No evidence of recurrent or metastatic disease. Workstation performed: [id number]     I reviewed the above laboratory and imaging data. Discussion/Summary: History stage I thyroid cancer 2 years post thyroidectomy and radioactive iodine therapy. Doing well.   Follow-up in 1 year with blood work and ultrasound that time for continued surveillance per guidelines.

## 2023-11-14 NOTE — PROGRESS NOTES
Surgical Oncology Follow Up       [address] [facility]  [address]  [facility] [address]  [address] Stefano Bent  1970  [id number]  [address] [facility] [place]  [address] [address] [phone]    Chief Complaint   Patient presents with    Follow-up       Assessment/Plan:    No problem-specific Assessment & Plan notes found for this encounter. Diagnoses and all orders for this visit:    Encounter for follow-up surveillance of thyroid cancer    Hx of thyroid cancer      Advance Care Planning/Advance Directives:  Discussed disease status, cancer treatment plans and/or cancer treatment goals with the patient. Oncology History   Hx of thyroid cancer   9/21/2021 Surgery    Total thyroidectomy:  - Right lobe, 4.5 cm lesion:  Grossly encapsulated, minimally invasive, follicular carcinoma; confined to thyroid. - Left lobe: Two foci of papillary microcarcinoma (4 mm and 3 mm, respectively) both confined        to thyroid. 2/3/2023 - 2/3/2023 Radiation    Radioactive iodine therapy         History of Present Illness: Patient is a 43-year-old woman with history of right-sided thyroid cancer status post total thyroidectomy 2 years ago, followed by radioactive iodine therapy. She is here for surveillance.  -Interval History: Blood work and ultrasound done in anticipation of today's visit. Review of Systems:  Review of Systems   Constitutional: Negative. HENT: Negative. Eyes: Negative. Respiratory: Negative. Cardiovascular: Negative. Gastrointestinal: Negative. Endocrine: Negative. Genitourinary: Negative. Musculoskeletal: Negative. Skin: Negative. Allergic/Immunologic: Negative. Neurological: Negative. Hematological: Negative. Psychiatric/Behavioral: Negative.      All other systems reviewed and are negative.       Patient Active Problem List   Diagnosis    Generalized osteoarthritis of multiple sites    Acquired hypothyroidism    Stress incontinence, female    Surgical menopause    Lumbar disc disease    Chronic right-sided low back pain without sciatica    Lumbar radiculopathy    Hypertriglyceridemia    Status post bariatric surgery    Hx of thyroid cancer    Thiamine deficiency    History of partial gastrectomy    Vaginal atrophy    Encounter for follow-up surveillance of thyroid cancer    Vitamin D deficiency     Past Medical History:   Diagnosis Date    Abnormal Pap smear of cervix     Arthritis     Cervical dysplasia     LAST ASSESSED: [id number]    Disease of thyroid gland     Generalized anxiety disorder 2012    HPV (human papilloma virus) infection     Hypothyroid     Minimally invasive follicular carcinoma of thyroid ([address]) 2021    Pilonidal disease of philip cleft 2022     Past Surgical History:   Procedure Laterality Date    BILATERAL SALPINGOOPHORECTOMY Bilateral     CERVICAL BIOPSY  W/ LOOP ELECTRODE EXCISION      CHOLECYSTECTOMY      COLPOSCOPY      GASTRECTOMY SLEEVE LAPAROSCOPIC      HYSTERECTOMY       age 29    LYMPH NODE BIOPSY      OOPHORECTOMY Bilateral     age 29    THYROIDECTOMY Bilateral 2021    Procedure: THYROIDECTOMY, total;  Surgeon: Trina Stinson MD;  [facility];  Service: Surgical Oncology    TUBAL LIGATION      US GUIDED LYMPH NODE BIOPSY LEFT  2022    US GUIDED THYROID BIOPSY  2021    US GUIDED THYROID BIOPSY  06/15/2021     Family History   Problem Relation Age of Onset    Diabetes Father     Hypertension Father     Diabetes Family     Hypertension Family     Skin cancer Family     COPD Mother     Alcohol abuse Mother     Substance Abuse Mother     Mental illness Mother     Depression Mother     Coronary artery disease Mother     Asthma Mother     Anxiety disorder Mother     Bipolar disorder Mother     Drug abuse Mother Psychiatric Illness Mother     Schizophrenia Mother     Suicide Attempts Mother     Breast cancer Maternal Grandmother 61    No Known Problems Sister     No Known Problems Daughter     No Known Problems Maternal Grandfather     No Known Problems Paternal Grandmother     Diabetes type I Paternal Grandfather     No Known Problems Maternal Aunt     No Known Problems Maternal Aunt     No Known Problems Paternal Aunt      Social History     Socioeconomic History    Marital status: Single     Spouse name: Not on file    Number of children: Not on file    Years of education: Not on file    Highest education level: Not on file   Occupational History    Not on file   Tobacco Use    Smoking status: Former     Packs/day: 0.25     Years: 2.00     Total pack years: 0.50     Types: Cigarettes     Quit date: 1990     Years since quittin.8    Smokeless tobacco: Never   Vaping Use    Vaping Use: Never used   Substance and Sexual Activity    Alcohol use: Yes     Comment: SOCIAL    Drug use: No     Comment: SUBSTANCE ABUSE IN THE PAST    Sexual activity: Yes     Partners: Male     Birth control/protection: Post-menopausal, Female Sterilization   Other Topics Concern    Not on file   Social History Narrative    USES SAFETY EQUIPMENT - [employer]     Social Determinants of Health     Financial Resource Strain: Low Risk  (3/21/2023)    Overall Financial Resource Strain (CARDIA)     Difficulty of Paying Living Expenses: Not hard at all   Food Insecurity: No Food Insecurity (3/21/2023)    Hunger Vital Sign     Worried About Running Out of Food in the Last Year: Never true     Ran Out of Food in the Last Year: Never true   Transportation Needs: No Transportation Needs (3/21/2023)    PRAPARE - Transportation     Lack of Transportation (Medical): No     Lack of Transportation (Non-Medical):  No   Physical Activity: Not on file   Stress: Not on file   Social Connections: Not on file   Intimate Partner Violence: Not on file   Housing Stability: Low Risk  (8/9/2021)    Housing Stability Vital Sign     Unable to Pay for Housing in the Last Year: No     Number of Places Lived in the Last Year: 1     Unstable Housing in the Last Year: No       Current Outpatient Medications:     Cholecalciferol (Vitamin D3) 50 MCG (2000 UT) capsule, Take 1 capsule (2,000 Units total) by mouth daily, Disp: 30 capsule, Rfl: 5    levothyroxine 137 mcg tablet, Take 1 tablet (137 mcg total) by mouth daily, Disp: 90 tablet, Rfl: 3  Allergies   Allergen Reactions    Ace Inhibitors Cough    Compazine [Prochlorperazine] Other (See Comments)     "makes me crazy"    Phenothiazines Confusion    Quinolones Other (See Comments)     Patient does not remember reaction    Sulfamethoxazole-Trimethoprim Blisters     In mouth     Vitals:    11/14/23 1013   BP: 116/78   Pulse: 74   Resp: 18   Temp: (!) 96.8 °F (36 °C)   SpO2: 98%       Physical Exam  Vitals reviewed. Constitutional:       Appearance: Normal appearance. HENT:      Head: Normocephalic and atraumatic. Right Ear: External ear normal.      Left Ear: External ear normal.      Nose: Nose normal.   Eyes:      Extraocular Movements: Extraocular movements intact. Pupils: Pupils are equal, round, and reactive to light. Cardiovascular:      Rate and Rhythm: Normal rate and regular rhythm. Pulses: Normal pulses. Heart sounds: Normal heart sounds. Pulmonary:      Effort: Pulmonary effort is normal.      Breath sounds: Normal breath sounds. Abdominal:      General: Abdomen is flat. Palpations: Abdomen is soft. Musculoskeletal:         General: Normal range of motion. Cervical back: Normal range of motion and neck supple. No rigidity or tenderness. Lymphadenopathy:      Cervical: No cervical adenopathy. Skin:     General: Skin is warm and dry. Neurological:      General: No focal deficit present. Mental Status: She is alert and oriented to person, place, and time.    Psychiatric: Mood and Affect: Mood normal.         Behavior: Behavior normal.         Thought Content: Thought content normal.         Judgment: Judgment normal.           Results:  Labs:  Component  Ref Range & Units 10/26/23  9:48 AM 5/12/23  9:22 AM 2/3/23  2:12 PM 10/10/22 10:30 AM   Thyroglobulin-NEY  1.5 - 38.5 ng/mL <0.1        Lab Results   Component Value Date    [id number] 0.748 10/26/2023    TSH 1.38 06/14/2021    H4RSVVS 11.3 05/12/2023         Imaging  US head neck lymph node mapping    Result Date: 11/12/2023  Narrative: NECK ULTRASOUND INDICATION:     Z85.850: Personal history of malignant neoplasm of thyroid. COMPARISON: 5/1/2023 FINDINGS: Ultrasound of the thyroidectomy bed and cervical lymph node chains was performed with a high frequency linear transducer. There is no suspicion of recurrent mass in the thyroidectomy bed. Lymph nodes maintain normal morphologic contour, echogenicity and short axis dimensions of less than 0.7 cm. No evidence for microcalcification or focal nodularity. Impression: No evidence of recurrent or metastatic disease. Workstation performed: [id number]     I reviewed the above laboratory and imaging data. Discussion/Summary: History stage I thyroid cancer 2 years post thyroidectomy and radioactive iodine therapy. Doing well. Follow-up in 1 year with blood work and ultrasound that time for continued surveillance per guidelines.

## 2023-12-07 DIAGNOSIS — E55.9 VITAMIN D DEFICIENCY: ICD-10-CM

## 2023-12-08 RX ORDER — ACETAMINOPHEN 160 MG
2000 TABLET,DISINTEGRATING ORAL DAILY
Qty: 30 CAPSULE | Refills: 0 | Status: SHIPPED | OUTPATIENT
Start: 2023-12-08

## 2023-12-18 ENCOUNTER — OFFICE VISIT (OUTPATIENT)
Dept: FAMILY MEDICINE CLINIC | Facility: CLINIC | Age: 53
End: 2023-12-18
Payer: COMMERCIAL

## 2023-12-18 ENCOUNTER — TELEPHONE (OUTPATIENT)
Dept: ADMINISTRATIVE | Facility: OTHER | Age: 53
End: 2023-12-18

## 2023-12-18 VITALS
HEIGHT: 66 IN | SYSTOLIC BLOOD PRESSURE: 118 MMHG | WEIGHT: 185 LBS | HEART RATE: 85 BPM | BODY MASS INDEX: 29.73 KG/M2 | TEMPERATURE: 97.2 F | DIASTOLIC BLOOD PRESSURE: 68 MMHG | OXYGEN SATURATION: 98 %

## 2023-12-18 DIAGNOSIS — E55.9 VITAMIN D DEFICIENCY: ICD-10-CM

## 2023-12-18 DIAGNOSIS — E03.9 ACQUIRED HYPOTHYROIDISM: Primary | ICD-10-CM

## 2023-12-18 DIAGNOSIS — Z23 ENCOUNTER FOR IMMUNIZATION: ICD-10-CM

## 2023-12-18 DIAGNOSIS — Z85.850 HISTORY OF THYROID CANCER: ICD-10-CM

## 2023-12-18 PROCEDURE — 99214 OFFICE O/P EST MOD 30 MIN: CPT | Performed by: FAMILY MEDICINE

## 2023-12-18 PROCEDURE — 90471 IMMUNIZATION ADMIN: CPT

## 2023-12-18 PROCEDURE — 90686 IIV4 VACC NO PRSV 0.5 ML IM: CPT

## 2023-12-18 NOTE — TELEPHONE ENCOUNTER
----- Message from Hannah Soares sent at 12/18/2023 10:44 AM EST -----  Regarding: DEXA  12/18/23 10:45 AM    Hello, our patient Madeleine Short has had DEXA Scan completed/performed. Please assist in updating the patient chart by pulling the Care Everywhere (CE) document. The date of service is 09/26/2022.     Thank you,  Hannah Soares  [facility]

## 2023-12-18 NOTE — TELEPHONE ENCOUNTER
Upon review of the In Basket request we were able to locate, review, and update the patient chart as requested for DEXA Scan.    Any additional questions or concerns should be emailed to the Practice Liaisons via the appropriate education email address, please do not reply via In Basket.    Thank you  Jay Roth MA

## 2023-12-18 NOTE — PROGRESS NOTES
Chief Complaint   Patient presents with   • Hypothyroidism     No refills needed      Name: Madeleine Short      : 1970      MRN: [id number]  Encounter Provider: Tacho Clemnet MD  Encounter Date: 2023   Encounter department: [facility]    Assessment & Plan     Continue levothyroxine as prescribed and follow-up with endocrinology.  Continue vitamin D for history of vitamin D deficiency.  Follow-up with oncology as scheduled for history of thyroid cancer status post thyroidectomy.  Flu shot administered today.  We will check a lipid panel and CMP.  RTC in 6 months for follow-up and annual    1. Acquired hypothyroidism  -     Lipid panel; Future  -     Comprehensive metabolic panel; Future    2. Vitamin D deficiency    3. History of thyroid cancer  -     Lipid panel; Future  -     Comprehensive metabolic panel; Future    4. Encounter for immunization  -     influenza vaccine, quadrivalent, 0.5 mL, preservative-free, for adult and pediatric patients 6 mos+ (AFLURIA, FLUARIX, FLULAVAL, FLUZONE)    5. BMI 29.0-29.9,adult           Subjective      She presents today for a follow-up on hypothyroidism, vitamin D deficiency, history of thyroid cancer.  Follows up with endocrinology.  Also following up with oncology.  Overall states she is doing well.  Stable on her current dose of levothyroxine.  Denies any new complaints today.      Review of Systems   Constitutional:  Negative for activity change, appetite change, chills, fatigue and fever.   HENT:  Negative for congestion, rhinorrhea, sneezing and sore throat.    Eyes:  Negative for pain, discharge, redness and itching.   Respiratory:  Negative for cough, chest tightness, shortness of breath and wheezing.    Cardiovascular:  Negative for chest pain and palpitations.   Gastrointestinal:  Negative for abdominal distention, abdominal pain, constipation, diarrhea, nausea and vomiting.   Musculoskeletal:  Negative for arthralgias, back  "pain, joint swelling and myalgias.   Skin:  Negative for rash.   Neurological:  Negative for dizziness, weakness, numbness and headaches.   Hematological:  Negative for adenopathy.   Psychiatric/Behavioral:  Negative for dysphoric mood. The patient is not nervous/anxious.    All other systems reviewed and are negative.      Current Outpatient Medications on File Prior to Visit   Medication Sig   • Cholecalciferol (Vitamin D3) 50 MCG (2000 UT) capsule Take 1 capsule by mouth once daily   • levothyroxine 137 mcg tablet Take 1 tablet (137 mcg total) by mouth daily       Objective     /68   Pulse 85   Temp (!) 97.2 °F (36.2 °C)   Ht 5' 6\" (1.676 m)   Wt 83.9 kg (185 lb)   SpO2 98%   BMI 29.86 kg/m²     Physical Exam  Vitals reviewed.   Constitutional:       General: She is not in acute distress.     Appearance: Normal appearance. She is well-developed. She is obese. She is not toxic-appearing or diaphoretic.   HENT:      Head: Normocephalic and atraumatic.      Right Ear: External ear normal.      Left Ear: External ear normal.      Nose: Nose normal.      Mouth/Throat:      Mouth: Mucous membranes are moist.   Eyes:      General: No scleral icterus.        Right eye: No discharge.         Left eye: No discharge.      Conjunctiva/sclera: Conjunctivae normal.   Cardiovascular:      Rate and Rhythm: Normal rate and regular rhythm.      Pulses: Normal pulses.      Heart sounds: Normal heart sounds. No murmur heard.  Pulmonary:      Effort: Pulmonary effort is normal. No respiratory distress.      Breath sounds: Normal breath sounds. No wheezing.   Abdominal:      General: Abdomen is flat. There is no distension.      Palpations: Abdomen is soft. There is no mass.      Tenderness: There is no abdominal tenderness.      Hernia: No hernia is present.   Musculoskeletal:         General: No swelling, tenderness, deformity or signs of injury.      Cervical back: Normal range of motion and neck supple. No tenderness. "   Lymphadenopathy:      Cervical: No cervical adenopathy.   Skin:     General: Skin is warm and dry.      Capillary Refill: Capillary refill takes less than 2 seconds.      Coloration: Skin is not pale.      Findings: No erythema.   Neurological:      General: No focal deficit present.      Mental Status: She is alert.   Psychiatric:         Mood and Affect: Mood normal.         Behavior: Behavior normal.       Tacho Clement MD

## 2024-01-12 ENCOUNTER — TELEPHONE (OUTPATIENT)
Age: 54
End: 2024-01-12

## 2024-01-12 NOTE — TELEPHONE ENCOUNTER
Pt called to check her recall for colon date, he recall is 09.01.24 so she wants to call  to date

## 2024-02-12 ENCOUNTER — CLINICAL SUPPORT (OUTPATIENT)
Dept: FAMILY MEDICINE CLINIC | Facility: CLINIC | Age: 54
End: 2024-02-12
Payer: COMMERCIAL

## 2024-02-12 DIAGNOSIS — Z23 ENCOUNTER FOR IMMUNIZATION: Primary | ICD-10-CM

## 2024-02-12 PROCEDURE — 90750 HZV VACC RECOMBINANT IM: CPT

## 2024-02-12 PROCEDURE — 90471 IMMUNIZATION ADMIN: CPT

## 2024-02-27 ENCOUNTER — OFFICE VISIT (OUTPATIENT)
Dept: URGENT CARE | Age: 54
End: 2024-02-27
Payer: COMMERCIAL

## 2024-02-27 ENCOUNTER — TELEPHONE (OUTPATIENT)
Age: 54
End: 2024-02-27

## 2024-02-27 VITALS
OXYGEN SATURATION: 99 % | RESPIRATION RATE: 18 BRPM | DIASTOLIC BLOOD PRESSURE: 81 MMHG | HEART RATE: 88 BPM | SYSTOLIC BLOOD PRESSURE: 141 MMHG | TEMPERATURE: 97 F

## 2024-02-27 DIAGNOSIS — L72.0 CYST OF SKIN AND SUBCUTANEOUS TISSUE: Primary | ICD-10-CM

## 2024-02-27 PROCEDURE — 99213 OFFICE O/P EST LOW 20 MIN: CPT

## 2024-02-27 RX ORDER — DOXYCYCLINE 100 MG/1
100 TABLET ORAL 2 TIMES DAILY
Qty: 14 TABLET | Refills: 0 | Status: SHIPPED | OUTPATIENT
Start: 2024-02-27 | End: 2024-03-05

## 2024-02-27 NOTE — TELEPHONE ENCOUNTER
Patient contacted the office this afternoon requesting an appt. She has a cyst/ingrown hair on her lower back upper buttock region. She wanted to get it checked out, due to limited time patient had availably I couldn't accommodate at this time. Patient will go to an [facility] to be evaluated. I did relay that the note will update in her chart for the provider as well. She had no further questions at this time.

## 2024-02-27 NOTE — PATIENT INSTRUCTIONS
Take antibiotic as prescribed. Recommend probiotic use while taking antibiotic.  Avoid direct sunlight with use of doxycyline, reapply SPF 50 at least every 1-2 hours, wear long sleeves, and a wide brimmed hat.  Recommend warm compresses.   Acetaminophen or ibuprofen for pain.   Follow-up with PCP in 2-3 days. Go to ER if symptoms worsen.

## 2024-02-27 NOTE — PROGRESS NOTES
[facility]        NAME: Madeleine Short is a 53 y.o. female  : 1970    MRN: [id number]  DATE: 2024  TIME: 3:49 PM      Assessment and Plan     Cyst of skin and subcutaneous tissue [L72.0]  1. Cyst of skin and subcutaneous tissue  doxycycline (ADOXA) 100 MG tablet            Patient Instructions     Take antibiotic as prescribed. Recommend probiotic use while taking antibiotic.  Avoid direct sunlight with use of doxycyline, reapply SPF 50 at least every 1-2 hours, wear long sleeves, and a wide brimmed hat.  Recommend warm compresses.   Acetaminophen or ibuprofen for pain.   Follow-up with PCP in 2-3 days. Go to ER if symptoms worsen.     Chief Complaint     Chief Complaint   Patient presents with    Cyst     Cyst buttock. Noticed about 2 days ago.          History of Present Illness     Patient is a 53-year-old female who presents with wound to sacral area for the past few days. States she tried looking at it with a mirror. Denies fever. Denies drainage. Reports allergy to bactrim.         Review of Systems     Review of Systems   Constitutional:  Negative for fever.   Skin:  Positive for color change (erythema) and wound.   All other systems reviewed and are negative.        Current Medications       Current Outpatient Medications:     Cholecalciferol (Vitamin D3) 50 MCG (2000 UT) capsule, Take 1 capsule by mouth once daily, Disp: 30 capsule, Rfl: 0    doxycycline (ADOXA) 100 MG tablet, Take 1 tablet (100 mg total) by mouth 2 (two) times a day for 7 days, Disp: 14 tablet, Rfl: 0    levothyroxine 137 mcg tablet, Take 1 tablet (137 mcg total) by mouth daily, Disp: 90 tablet, Rfl: 3    Current Allergies     Allergies as of 2024 - Reviewed 2024   Allergen Reaction Noted    Ace inhibitors Cough 07/10/2013    Compazine [prochlorperazine] Other (See Comments) 03/10/2021    Phenothiazines Confusion 2012    Quinolones Other (See Comments) 2012     Sulfamethoxazole-trimethoprim Blisters 2012              The following portions of the patient's history were reviewed and updated as appropriate: allergies, current medications, past family history, past medical history, past social history, past surgical history and problem list.     Past Medical History:   Diagnosis Date    Abnormal Pap smear of cervix     Arthritis     Cervical dysplasia     LAST ASSESSED: 2016    Disease of thyroid gland     Generalized anxiety disorder 2012    HPV (human papilloma virus) infection     Hypothyroid     Minimally invasive follicular carcinoma of thyroid (HCC) 2021    Pilonidal disease of philip cleft 2022       Past Surgical History:   Procedure Laterality Date    BILATERAL SALPINGOOPHORECTOMY Bilateral     CERVICAL BIOPSY  W/ LOOP ELECTRODE EXCISION      CHOLECYSTECTOMY      COLPOSCOPY      GASTRECTOMY SLEEVE LAPAROSCOPIC      HYSTERECTOMY       age 36    LYMPH NODE BIOPSY      OOPHORECTOMY Bilateral     age 36    THYROIDECTOMY Bilateral 2021    Procedure: THYROIDECTOMY, total;  Surgeon: Jonatan Strange MD;  [facility]: [facility];  Service: Surgical Oncology    TUBAL LIGATION      US GUIDED LYMPH NODE BIOPSY LEFT  2022    US GUIDED THYROID BIOPSY  2021    US GUIDED THYROID BIOPSY  06/15/2021       Family History   Problem Relation Age of Onset    Diabetes Father     Hypertension Father     Diabetes Family     Hypertension Family     Skin cancer Family     COPD Mother     Alcohol abuse Mother     Substance Abuse Mother     Mental illness Mother     Depression Mother     Coronary artery disease Mother     Asthma Mother     Anxiety disorder Mother     Bipolar disorder Mother     Drug abuse Mother     Psychiatric Illness Mother     Schizophrenia Mother     Suicide Attempts Mother     Breast cancer Maternal Grandmother 63    No Known Problems Sister     No Known Problems Daughter     No Known Problems Maternal Grandfather      No Known Problems Paternal Grandmother     Diabetes type I Paternal Grandfather     No Known Problems Maternal Aunt     No Known Problems Maternal Aunt     No Known Problems Paternal Aunt          Medications have been verified.        Objective     /81   Pulse 88   Temp (!) 97 °F (36.1 °C) (Tympanic)   Resp 18   SpO2 99%   No LMP recorded. Patient has had a hysterectomy.         Physical Exam     Physical Exam  Vitals and nursing note reviewed.   Constitutional:       General: She is awake. She is not in acute distress.     Appearance: Normal appearance. She is not ill-appearing, toxic-appearing or diaphoretic.   Cardiovascular:      Rate and Rhythm: Normal rate.      Pulses: Normal pulses.      Heart sounds: Normal heart sounds, S1 normal and S2 normal.   Pulmonary:      Effort: Pulmonary effort is normal.      Breath sounds: Normal breath sounds and air entry.   Skin:     General: Skin is warm.      Capillary Refill: Capillary refill takes less than 2 seconds.             Comments: Ynes MORALES at bedside to chaperone   Neurological:      Mental Status: She is alert.   Psychiatric:         Mood and Affect: Mood normal.         Behavior: Behavior normal.         Thought Content: Thought content normal.         Judgment: Judgment normal.

## 2024-03-02 DIAGNOSIS — E55.9 VITAMIN D DEFICIENCY: ICD-10-CM

## 2024-03-04 RX ORDER — ACETAMINOPHEN 160 MG
2000 TABLET,DISINTEGRATING ORAL DAILY
Qty: 30 CAPSULE | Refills: 0 | Status: SHIPPED | OUTPATIENT
Start: 2024-03-04

## 2024-04-15 ENCOUNTER — ANNUAL EXAM (OUTPATIENT)
Dept: OBGYN CLINIC | Facility: CLINIC | Age: 54
End: 2024-04-15

## 2024-04-15 VITALS
WEIGHT: 182.6 LBS | DIASTOLIC BLOOD PRESSURE: 84 MMHG | BODY MASS INDEX: 29.35 KG/M2 | SYSTOLIC BLOOD PRESSURE: 133 MMHG | HEART RATE: 73 BPM | HEIGHT: 66 IN

## 2024-04-15 DIAGNOSIS — Z12.11 COLON CANCER SCREENING: ICD-10-CM

## 2024-04-15 DIAGNOSIS — Z01.419 ROUTINE GYNECOLOGICAL EXAMINATION: Primary | ICD-10-CM

## 2024-04-15 DIAGNOSIS — Z12.31 ENCOUNTER FOR SCREENING MAMMOGRAM FOR MALIGNANT NEOPLASM OF BREAST: ICD-10-CM

## 2024-04-15 DIAGNOSIS — Z12.4 SCREENING FOR CERVICAL CANCER: ICD-10-CM

## 2024-04-15 PROCEDURE — G0476 HPV COMBO ASSAY CA SCREEN: HCPCS | Performed by: OBSTETRICS & GYNECOLOGY

## 2024-04-15 PROCEDURE — G0145 SCR C/V CYTO,THINLAYER,RESCR: HCPCS | Performed by: OBSTETRICS & GYNECOLOGY

## 2024-04-15 PROCEDURE — 99396 PREV VISIT EST AGE 40-64: CPT | Performed by: OBSTETRICS & GYNECOLOGY

## 2024-04-15 NOTE — PROGRESS NOTES
ANNUAL GYNECOLOGICAL EXAMINATION    Madeleine Short is a 54 y.o. female who presents today for annual GYN exam.  Her last pap smear was performed 21 and result was negative.  She reports history of abnormal pap smears in her past.  Her last mammogram was performed 5/15/23 and result was negative.  She had colon cancer screening performed 21.  She had HIV screening performed 10/10/22 and it was negative.  No LMP recorded. Patient has had a hysterectomy.  Her general medical history has been reviewed and she reports it as follows:    Past Medical History:   Diagnosis Date    Abnormal Pap smear of cervix     Arthritis     Cervical dysplasia     LAST ASSESSED: [id number]    Disease of thyroid gland     Generalized anxiety disorder 2012    HPV (human papilloma virus) infection     Hypothyroid     Minimally invasive follicular carcinoma of thyroid (HCC) 2021    Pilonidal disease of  cleft 2022     Past Surgical History:   Procedure Laterality Date    BILATERAL SALPINGOOPHORECTOMY Bilateral     CERVICAL BIOPSY  W/ LOOP ELECTRODE EXCISION      CHOLECYSTECTOMY      COLPOSCOPY      GASTRECTOMY SLEEVE LAPAROSCOPIC      HYSTERECTOMY       age 36    LYMPH NODE BIOPSY      OOPHORECTOMY Bilateral     age 36    THYROIDECTOMY Bilateral 2021    Procedure: THYROIDECTOMY, total;  Surgeon: Jonatan Strange MD;  [facility];  Service: Surgical Oncology    TUBAL LIGATION      US GUIDED LYMPH NODE BIOPSY LEFT  2022    US GUIDED THYROID BIOPSY  2021    US GUIDED THYROID BIOPSY  06/15/2021     OB History          3    Para   2    Term   2            AB   1    Living   2         SAB        IAB   1    Ectopic        Multiple        Live Births   2           Obstetric Comments   MENOPAUSE             Social History     Tobacco Use    Smoking status: Former     Current packs/day: 0.00     Average packs/day: 0.3 packs/day for 2.0 years (0.5 ttl pk-yrs)     Types:  "Cigarettes     Start date: 1988     Quit date: 1990     Years since quittin.3    Smokeless tobacco: Never   Vaping Use    Vaping status: Never Used   Substance Use Topics    Alcohol use: Yes     Comment: SOCIAL    Drug use: No     Comment: SUBSTANCE ABUSE IN THE PAST     Social History     Substance and Sexual Activity   Sexual Activity Not Currently    Partners: Male    Birth control/protection: Post-menopausal, Female Sterilization     Cancer-related family history includes Breast cancer (age of onset: 63) in her maternal grandmother; Skin cancer in her family.    Current Outpatient Medications   Medication Instructions    levothyroxine 137 mcg, Oral, Daily    Vitamin D3 2,000 Units, Oral, Daily       Review of Systems:  Review of Systems   Genitourinary:  Negative for pelvic pain, vaginal bleeding and vaginal discharge.   All other systems reviewed and are negative.      Physical Exam:  /84 (BP Location: Left arm, Patient Position: Sitting, Cuff Size: Standard)   Pulse 73   Ht 5' 6\" (1.676 m)   Wt 82.8 kg (182 lb 9.6 oz)   BMI 29.47 kg/m²   Physical Exam  Constitutional:       Appearance: Normal appearance.   Genitourinary:      Bladder and urethral meatus normal.      No lesions in the vagina.      Right Labia: No rash, tenderness, lesions or skin changes.     Left Labia: No tenderness, lesions, skin changes or rash.     No inguinal adenopathy present in the right or left side.     Vaginal cuff intact.     No vaginal discharge.        Right Adnexa: absent.     Left Adnexa: absent.     Cervix is absent.      Uterus is absent.      No urethral tenderness or mass present.   Breasts:     Breasts are soft.     Right: No swelling, inverted nipple, mass, nipple discharge, skin change or tenderness.      Left: No swelling, inverted nipple, mass, nipple discharge, skin change or tenderness.   HENT:      Head: Normocephalic and atraumatic.   Cardiovascular:      Rate and Rhythm: Normal rate and " regular rhythm.   Pulmonary:      Effort: Pulmonary effort is normal.      Breath sounds: Normal breath sounds.   Abdominal:      General: Bowel sounds are normal.      Palpations: Abdomen is soft.      Hernia: There is no hernia in the left inguinal area or right inguinal area.   Musculoskeletal:         General: Normal range of motion.      Cervical back: Normal range of motion and neck supple.   Lymphadenopathy:      Upper Body:      Right upper body: No supraclavicular or axillary adenopathy.      Left upper body: No supraclavicular or axillary adenopathy.      Lower Body: No right inguinal adenopathy. No left inguinal adenopathy.   Neurological:      Mental Status: She is alert and oriented to person, place, and time.   Skin:     General: Skin is warm and dry.   Psychiatric:         Mood and Affect: Mood normal.   Vitals reviewed.         Assessment/Plan:   1. Normal well-woman GYN exam.  2. Cervical cancer screening:  Normal vaginal cuff.  Pap smear done with HPV co-testing.   3. STD screening:  Patient declines.   4. Breast cancer screening:  Normal breast exam.  Order placed for bilateral screening mammogram.  Reviewed breast self-awareness.   5. Colon cancer screening:  Order placed for consultation with Gastroenterology for consultation to discuss screening.   6. Depression Screening: Patient's depression screening was assessed with a PHQ-2 score of 0. Their PHQ-9 score was 0. Clinically patient does not have depression. No treatment is required.  Referral placed for  consultation.   7. BMI Counseling: Body mass index is 29.47 kg/m². Discussed the patient's BMI with her. The BMI is above normal. Nutrition recommendations include decreasing overall calorie intake.   8. Contraception:  hysterectomy   9. Return to office 1yr/prn.    Reviewed with patient that [facility], but that they will not necessarily be reviewed by me immediately.  Explained that I  will review results at my earliest opportunity and contact patient appropriately.

## 2024-04-16 LAB
HPV HR 12 DNA CVX QL NAA+PROBE: NEGATIVE
HPV16 DNA CVX QL NAA+PROBE: NEGATIVE
HPV18 DNA CVX QL NAA+PROBE: NEGATIVE

## 2024-04-20 LAB
LAB AP GYN PRIMARY INTERPRETATION: NORMAL
Lab: NORMAL

## 2024-05-09 DIAGNOSIS — E55.9 VITAMIN D DEFICIENCY: ICD-10-CM

## 2024-05-09 RX ORDER — ACETAMINOPHEN 160 MG
2000 TABLET,DISINTEGRATING ORAL DAILY
Qty: 90 CAPSULE | Refills: 1 | Status: SHIPPED | OUTPATIENT
Start: 2024-05-09

## 2024-05-20 ENCOUNTER — HOSPITAL ENCOUNTER (OUTPATIENT)
Dept: RADIOLOGY | Facility: IMAGING CENTER | Age: 54
Discharge: HOME | End: 2024-05-20
Payer: COMMERCIAL

## 2024-05-20 VITALS — BODY MASS INDEX: 29.66 KG/M2 | WEIGHT: 178 LBS | HEIGHT: 65 IN

## 2024-05-20 DIAGNOSIS — Z12.31 ENCOUNTER FOR SCREENING MAMMOGRAM FOR MALIGNANT NEOPLASM OF BREAST: ICD-10-CM

## 2024-05-20 PROCEDURE — 77063 BREAST TOMOSYNTHESIS BI: CPT

## 2024-05-20 PROCEDURE — 77067 SCR MAMMO BI INCL CAD: CPT

## 2024-05-28 ENCOUNTER — OFFICE VISIT (OUTPATIENT)
Dept: ENDOCRINOLOGY | Facility: CLINIC | Age: 54
End: 2024-05-28
Payer: COMMERCIAL

## 2024-05-28 VITALS
SYSTOLIC BLOOD PRESSURE: 130 MMHG | DIASTOLIC BLOOD PRESSURE: 88 MMHG | BODY MASS INDEX: 29.85 KG/M2 | HEIGHT: 65 IN | WEIGHT: 179.2 LBS

## 2024-05-28 DIAGNOSIS — Z85.850 HX OF THYROID CANCER: Primary | ICD-10-CM

## 2024-05-28 DIAGNOSIS — E03.9 ACQUIRED HYPOTHYROIDISM: ICD-10-CM

## 2024-05-28 PROCEDURE — 99214 OFFICE O/P EST MOD 30 MIN: CPT | Performed by: INTERNAL MEDICINE

## 2024-05-28 NOTE — PROGRESS NOTES
Established Patient Progress Note       Chief Complaint   Patient presents with    Thyroid Cancer        History of Present Illness:     Madeleine Short is a 54 y.o. female with a history of thyroid cancer, post-surgical hypothyroidism seen in f/u.  She was last seen by Rosey Davila in 10/2023. Overall she feels well.     For the Thyroid Cancer, she had thyroidectomy 9/21/2021. On final path, the left nodule was a minimally invasive FTC without extension.  Path is 4.5x3.9x2.8cm, pT3a. No lymphovascular invasion was noted. There are two areas of micropapillary CA (3mm and 4mm in the right lobe). No concerned nodes. Background of CLT is noted, but her TG abs are negative.   .    She had lymph node mapping ultrasound 4/11/2022 which showed 2 suspicious looking lymph nodes on the left side of the neck.  FNA of these nodes were performed.  The left sided lymph node in zone 3 did show atypically cellular changes but thyroglobulin testing was undetectable.  The left zone 4 lymph node was negative for malignancy and thyroglobulin testing was undetectable.  Due to low risk of recurrence, I-131 therapy was not recommended initially but she underwent this in 50mCi in Feb 2023 after the uncertain nodes were seen. This was under Thyrogen  The Feb 2023 WBS did not show any pre-treatment uptake or post treatment scan. TG was still undetectable.     She has repeat ultrasound scheduled 11/2023 which did not show any suspicious nodes.   Last TG remained undetectable with TSH at goal 0.7    For hypothyroidism, she is taking levothyroxine 137mcg daily.       For the Vitamin D Deficiency, she takes 2,000 units daily.       Patient Active Problem List   Diagnosis    Generalized osteoarthritis of multiple sites    Acquired hypothyroidism    Stress incontinence, female    Surgical menopause    Lumbar disc disease    Chronic right-sided low back pain without sciatica    Lumbar radiculopathy    Hypertriglyceridemia    Status post  bariatric surgery    History of thyroid cancer    Thiamine deficiency    History of partial gastrectomy    Vaginal atrophy    Encounter for follow-up surveillance of thyroid cancer    Vitamin D deficiency      Past Medical History:   Diagnosis Date    Abnormal Pap smear of cervix     Arthritis     Cervical dysplasia     LAST ASSESSED: [id number]    Disease of thyroid gland     Generalized anxiety disorder 2012    HPV (human papilloma virus) infection     Hypothyroid     Minimally invasive follicular carcinoma of thyroid (HCC) 2021    Pilonidal disease of  cleft 2022      Past Surgical History:   Procedure Laterality Date    BILATERAL SALPINGOOPHORECTOMY Bilateral     CERVICAL BIOPSY  W/ LOOP ELECTRODE EXCISION      CHOLECYSTECTOMY      COLPOSCOPY      GASTRECTOMY SLEEVE LAPAROSCOPIC      HYSTERECTOMY       age 36    LYMPH NODE BIOPSY      OOPHORECTOMY Bilateral     age 36    THYROIDECTOMY Bilateral 2021    Procedure: THYROIDECTOMY, total;  Surgeon: Jonatan Strange MD;  Location: [facility];  Service: Surgical Oncology    TUBAL LIGATION      US GUIDED LYMPH NODE BIOPSY LEFT  2022    US GUIDED THYROID BIOPSY  2021    US GUIDED THYROID BIOPSY  06/15/2021      Family History   Problem Relation Age of Onset    COPD Mother     Alcohol abuse Mother     Substance Abuse Mother     Mental illness Mother     Depression Mother     Coronary artery disease Mother     Asthma Mother     Anxiety disorder Mother     Bipolar disorder Mother     Drug abuse Mother     Psychiatric Illness Mother     Schizophrenia Mother     Suicide Attempts Mother     Diabetes Father     Hypertension Father     No Known Problems Sister     No Known Problems Daughter     Breast cancer Maternal Grandmother 63    No Known Problems Maternal Grandfather     No Known Problems Paternal Grandmother     Diabetes type I Paternal Grandfather     No Known Problems Maternal Aunt     No Known Problems Maternal Aunt   "   No Known Problems Paternal Aunt     Diabetes Family     Hypertension Family     Skin cancer Family     No Known Problems Son      Social History     Tobacco Use    Smoking status: Former     Current packs/day: 0.00     Average packs/day: 0.3 packs/day for 2.0 years (0.5 ttl pk-yrs)     Types: Cigarettes     Start date: 1988     Quit date: 1990     Years since quittin.4    Smokeless tobacco: Never   Substance Use Topics    Alcohol use: Yes     Comment: SOCIAL     Allergies   Allergen Reactions    Ace Inhibitors Cough    Compazine [Prochlorperazine] Other (See Comments)     \"makes me crazy\"    Phenothiazines Confusion    Quinolones Other (See Comments)     Patient does not remember reaction    Sulfamethoxazole-Trimethoprim Blisters     In mouth       Current Outpatient Medications:     Cholecalciferol (Vitamin D3) 50 MCG (2000 UT) capsule, Take 1 capsule by mouth once daily, Disp: 90 capsule, Rfl: 1    levothyroxine 137 mcg tablet, Take 1 tablet (137 mcg total) by mouth daily, Disp: 90 tablet, Rfl: 3    Review of Systems   Constitutional:  Negative for unexpected weight change.   HENT:  Negative for trouble swallowing and voice change.    Cardiovascular:  Negative for palpitations.   Gastrointestinal:  Negative for constipation and diarrhea.   Genitourinary:  Negative for menstrual problem.   Neurological:  Negative for tremors.   Psychiatric/Behavioral:  The patient is not nervous/anxious.        Physical Exam:  Body mass index is 29.82 kg/m².  /88 (BP Location: Left arm, Patient Position: Sitting, Cuff Size: Adult)   Ht 5' 5\" (1.651 m)   Wt 81.3 kg (179 lb 3.2 oz)   BMI 29.82 kg/m²    Wt Readings from Last 3 Encounters:   24 81.3 kg (179 lb 3.2 oz)   24 80.7 kg (178 lb)   04/15/24 82.8 kg (182 lb 9.6 oz)       Physical Exam   Gen: appears well-developed and well-nourished. No apparent distress.   Head: Normocephalic and atraumatic.   Eyes: no stare or proptosis, no periorbital " edema  E/N/M nl facies, hearing grossly intact  Neck: well healed incision, Trachea midline, no thyroid tissue, neck masses, or LAD.   Pulmonary/Chest: breathing  comfortably, no accessory muscle use, effort normal.   Musculoskeletal: moves all 4 extremities, gait nl  Neurological: alert and oriented to person, place, and time. No upper ext tremor appreciated  Skin: does not appear diaphoretic, no facial plethora  Psychiatric: normal mood and affect; behavior is normal; no gross lapses in memory, answer questions appropriately        Labs:   2/3/2023  Stimulated TG <0.1     Latest Reference Range & Units 06/29/23 09:42 10/26/23 09:48   TSH 3RD GENERATON 0.450 - 4.500 uIU/mL 1.136 0.748   FREE T4 0.61 - 1.12 ng/dL 1.30 (H)    THYROGLOBULIN AB 0.0 - 0.9 IU/mL  <1.0   Thyroglobulin-NEY 1.5 - 38.5 ng/mL  <0.1 (L)   (H): Data is abnormally high  (L): Data is abnormally low    Radiology  Feb 2023  F/u [facility]BS  COMPARISON:  Pretherapy scan 2/3/2023     FINDINGS:      Whole body images now demonstrate foci radiotracer uptake in the region of the right thyroid bed.     No findings for distant metastasis.     Otherwise physiologic distribution of the radiotracer.       IMPRESSION:     1.   Post therapy scan now demonstrates foci of radiotracer uptake in the right thyroid bed.  These could represent thyroid remnant versus local tyrone disease.  2.  No findings for distant metastasis    Pathology   9/2021  Thyroid thyroidectomy:     Right lobe, 4.5 cm lesion:  Grossly encapsulated, minimally invasive, follicular carcinoma;        confined to thyroid.        ICD10   C73     2.   Left lobe:  Two foci of papillary microcarcinoma (4 mm and 3 mm, respectively) both confined        to thyroid.        ICD10   C73     3.   Background thyroid with severe Hashimoto thryoditis and few scattered sub-centimeter        hyperplasia nodules      1. Hx of thyroid cancer  Comprehensive Thyroglobulin      2. Acquired hypothyroidism  TSH, 3rd  generation    T4, free            Follicular thyroid CA: She is stage 1, [id number].  Her suppressed thyroglobulin has been undetectable. There is no currently evidence of structural or biochemical disease.  F/u neck ultrasound for node surveillance as scheduled. Will update thyroglobulin and Abs in June when she gets labs from her PCP.      Hypothyroidism: Goal TSH 0.5-2 as she is stage 1. Continue levothyroxine 137mcg daily.  Plan f/u in 6mos and if stable, then likely once per year.     Discussed with the patient and all questioned fully answered. She will call me if any problems arise.        Counseled patient on diagnostic results, prognosis, risk and benefit of treatment options, instruction for management, importance of treatment compliance, Risk  factor reduction and impressions      Lorene Corea MD

## 2024-06-04 ENCOUNTER — APPOINTMENT (OUTPATIENT)
Dept: LAB | Facility: IMAGING CENTER | Age: 54
End: 2024-06-04
Payer: COMMERCIAL

## 2024-06-04 DIAGNOSIS — Z85.850 HISTORY OF THYROID CANCER: ICD-10-CM

## 2024-06-04 DIAGNOSIS — E03.9 ACQUIRED HYPOTHYROIDISM: ICD-10-CM

## 2024-06-04 DIAGNOSIS — E55.9 VITAMIN D DEFICIENCY: ICD-10-CM

## 2024-06-04 DIAGNOSIS — Z85.850 HX OF THYROID CANCER: ICD-10-CM

## 2024-06-04 LAB
25(OH)D3 SERPL-MCNC: 35.6 NG/ML (ref 30–100)
ALBUMIN SERPL BCP-MCNC: 4.3 G/DL (ref 3.5–5)
ALP SERPL-CCNC: 46 U/L (ref 34–104)
ALT SERPL W P-5'-P-CCNC: 14 U/L (ref 7–52)
ANION GAP SERPL CALCULATED.3IONS-SCNC: 8 MMOL/L (ref 4–13)
AST SERPL W P-5'-P-CCNC: 23 U/L (ref 13–39)
BILIRUB SERPL-MCNC: 0.52 MG/DL (ref 0.2–1)
BUN SERPL-MCNC: 19 MG/DL (ref 5–25)
CALCIUM SERPL-MCNC: 9 MG/DL (ref 8.4–10.2)
CHLORIDE SERPL-SCNC: 104 MMOL/L (ref 96–108)
CHOLEST SERPL-MCNC: 174 MG/DL
CO2 SERPL-SCNC: 28 MMOL/L (ref 21–32)
CREAT SERPL-MCNC: 0.87 MG/DL (ref 0.6–1.3)
GFR SERPL CREATININE-BSD FRML MDRD: 76 ML/MIN/1.73SQ M
GLUCOSE P FAST SERPL-MCNC: 85 MG/DL (ref 65–99)
HDLC SERPL-MCNC: 73 MG/DL
LDLC SERPL CALC-MCNC: 91 MG/DL (ref 0–100)
NONHDLC SERPL-MCNC: 101 MG/DL
POTASSIUM SERPL-SCNC: 4.2 MMOL/L (ref 3.5–5.3)
PROT SERPL-MCNC: 6.8 G/DL (ref 6.4–8.4)
SODIUM SERPL-SCNC: 140 MMOL/L (ref 135–147)
T4 FREE SERPL-MCNC: 1.26 NG/DL (ref 0.61–1.12)
TRIGL SERPL-MCNC: 49 MG/DL
TSH SERPL DL<=0.05 MIU/L-ACNC: 0.37 UIU/ML (ref 0.45–4.5)

## 2024-06-04 PROCEDURE — 86376 MICROSOMAL ANTIBODY EACH: CPT

## 2024-06-04 PROCEDURE — 86800 THYROGLOBULIN ANTIBODY: CPT

## 2024-06-04 PROCEDURE — 80061 LIPID PANEL: CPT

## 2024-06-04 PROCEDURE — 82306 VITAMIN D 25 HYDROXY: CPT

## 2024-06-04 PROCEDURE — 80053 COMPREHEN METABOLIC PANEL: CPT

## 2024-06-04 PROCEDURE — 84439 ASSAY OF FREE THYROXINE: CPT | Performed by: INTERNAL MEDICINE

## 2024-06-04 PROCEDURE — 84443 ASSAY THYROID STIM HORMONE: CPT | Performed by: INTERNAL MEDICINE

## 2024-06-04 PROCEDURE — 36415 COLL VENOUS BLD VENIPUNCTURE: CPT

## 2024-06-04 PROCEDURE — 84432 ASSAY OF THYROGLOBULIN: CPT

## 2024-06-05 ENCOUNTER — TELEPHONE (OUTPATIENT)
Dept: ENDOCRINOLOGY | Facility: CLINIC | Age: 54
End: 2024-06-05

## 2024-06-05 LAB
THYROGLOB AB SERPL-ACNC: <1 IU/ML (ref 0–0.9)
THYROGLOB AB SERPL-ACNC: <1 IU/ML (ref 0–0.9)
THYROGLOB SERPL-MCNC: <0.1 NG/ML (ref 1.5–38.5)
THYROPEROXIDASE AB SERPL-ACNC: <9 IU/ML (ref 0–34)

## 2024-06-05 NOTE — TELEPHONE ENCOUNTER
----- Message from Ko Daley PA-C sent at 6/5/2024 12:52 PM EDT -----  TSH very close to goal of 0.5 to 2.0  Continue same thyroid medication  If feeling symptoms of too much thyroid medication let us know and we can make slight dose reduction. (Tremors, palpitations, heat intolerance, etc)

## 2024-06-05 NOTE — TELEPHONE ENCOUNTER
I was able to call the patient and make her aware of the test results that were reviewed by the provider and also her recommendations at this time.  The patient gave a verbal understanding and stated she will call if she has any issues as mentioned in the providers previous note.

## 2024-06-10 ENCOUNTER — OFFICE VISIT (OUTPATIENT)
Dept: FAMILY MEDICINE CLINIC | Facility: CLINIC | Age: 54
End: 2024-06-10
Payer: COMMERCIAL

## 2024-06-10 VITALS
WEIGHT: 180.6 LBS | HEIGHT: 65 IN | BODY MASS INDEX: 30.09 KG/M2 | DIASTOLIC BLOOD PRESSURE: 74 MMHG | TEMPERATURE: 97.5 F | SYSTOLIC BLOOD PRESSURE: 134 MMHG

## 2024-06-10 DIAGNOSIS — E55.9 VITAMIN D DEFICIENCY: ICD-10-CM

## 2024-06-10 DIAGNOSIS — Z23 ENCOUNTER FOR IMMUNIZATION: ICD-10-CM

## 2024-06-10 DIAGNOSIS — Z85.850 ENCOUNTER FOR FOLLOW-UP SURVEILLANCE OF THYROID CANCER: ICD-10-CM

## 2024-06-10 DIAGNOSIS — E78.1 HYPERTRIGLYCERIDEMIA: ICD-10-CM

## 2024-06-10 DIAGNOSIS — Z00.00 ANNUAL PHYSICAL EXAM: ICD-10-CM

## 2024-06-10 DIAGNOSIS — M26.609 TMJ DISEASE: ICD-10-CM

## 2024-06-10 DIAGNOSIS — Z08 ENCOUNTER FOR FOLLOW-UP SURVEILLANCE OF THYROID CANCER: ICD-10-CM

## 2024-06-10 DIAGNOSIS — E03.9 ACQUIRED HYPOTHYROIDISM: Primary | ICD-10-CM

## 2024-06-10 PROCEDURE — 99396 PREV VISIT EST AGE 40-64: CPT | Performed by: FAMILY MEDICINE

## 2024-06-10 PROCEDURE — 99214 OFFICE O/P EST MOD 30 MIN: CPT | Performed by: FAMILY MEDICINE

## 2024-06-10 PROCEDURE — 90471 IMMUNIZATION ADMIN: CPT

## 2024-06-10 PROCEDURE — 90750 HZV VACC RECOMBINANT IM: CPT

## 2024-06-10 NOTE — PROGRESS NOTES
Adult Annual Physical  Name: Madeleine Short      : 1970      MRN: [id number]  Encounter Provider: Tacho Clement MD  Encounter Date: 6/10/2024   Encounter department: [facility] PRIMARY CARE    Assessment & Plan     Annual physical exam completed today.  I did review patient's recent blood work with her.  All seems stable.  Continue vitamin D supplements.  Continue levothyroxine for hypothyroidism and follow-up with endocrinology.  Shingrix No. 2 administered today.  Continue to work on diet and exercise.  Recheck labs in 6 months.  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  She was told in the past to find a doctor in [place] who specializes in this.  She will reach out to physicians in [place] who can manage her TMJ and will follow-up with us for referrals.  RTC in 6 months for follow-up    1. Acquired hypothyroidism  2. Encounter for follow-up surveillance of thyroid cancer  3. Vitamin D deficiency  -     Vitamin D 25 hydroxy; Future  4. TMJ disease  5. Hypertriglyceridemia  -     Comprehensive metabolic panel; Future  -     Lipid panel; Future  6. Annual physical exam  7. Encounter for immunization  -     Zoster Vaccine Recombinant IM    Immunizations and preventive care screenings were discussed with patient today. Appropriate education was printed on patient's after visit summary.    Counseling:  Dental Health: discussed importance of regular tooth brushing, flossing, and dental visits.  Exercise: the importance of regular exercise/physical activity was discussed. Recommend exercise 3-5 times per week for at least 30 minutes.          History of Present Illness     Adult Annual Physical:  Patient presents for annual physical.     Diet and Physical Activity:  - Diet/Nutrition: well balanced diet.  - Exercise:. exercises as much as she can    General Health:  - Sleep: sleeps well.  - Hearing: normal hearing bilateral ears.  - Vision: wears glasses.  - Dental: regular  "dental visits.    /GYN Health:  - Follows with GYN: yes.   - Menopause: premenopausal.   - History of STDs: yes    Advanced Care Planning:  - Has an advanced directive?: no    - Has a durable medical POA?: no    - ACP document given to patient?: no      Review of Systems   Constitutional:  Negative for activity change, appetite change, chills, fatigue and fever.   HENT:  Negative for congestion, rhinorrhea, sneezing and sore throat.    Eyes:  Negative for pain, discharge, redness and itching.   Respiratory:  Negative for cough, chest tightness, shortness of breath and wheezing.    Cardiovascular:  Negative for chest pain and palpitations.   Gastrointestinal:  Negative for abdominal distention, abdominal pain, constipation, diarrhea, nausea and vomiting.   Musculoskeletal:  Negative for arthralgias, back pain, joint swelling and myalgias.        Jaw pain. clicking   Skin:  Negative for rash.   Neurological:  Negative for dizziness, weakness, numbness and headaches.   Hematological:  Negative for adenopathy.   Psychiatric/Behavioral:  Negative for confusion and dysphoric mood.    All other systems reviewed and are negative.        Objective     /74   Temp 97.5 °F (36.4 °C) (Temporal)   Ht 5' 5\" (1.651 m)   Wt 81.9 kg (180 lb 9.6 oz)   BMI 30.05 kg/m²     Physical Exam  Vitals reviewed.   Constitutional:       General: She is not in acute distress.     Appearance: Normal appearance. She is well-developed. She is not ill-appearing or toxic-appearing.   HENT:      Head: Normocephalic and atraumatic.      Right Ear: Tympanic membrane, ear canal and external ear normal. There is no impacted cerumen.      Left Ear: Tympanic membrane, ear canal and external ear normal. There is no impacted cerumen.      Nose: Nose normal. No congestion or rhinorrhea.      Mouth/Throat:      Mouth: Mucous membranes are moist.      Pharynx: Oropharynx is clear. No oropharyngeal exudate.   Eyes:      General: No scleral icterus.   "      Right eye: No discharge.         Left eye: No discharge.      Extraocular Movements: Extraocular movements intact.      Conjunctiva/sclera: Conjunctivae normal.      Pupils: Pupils are equal, round, and reactive to light.   Cardiovascular:      Rate and Rhythm: Normal rate and regular rhythm.      Pulses: Normal pulses.      Heart sounds: Normal heart sounds. No murmur heard.  Pulmonary:      Effort: Pulmonary effort is normal. No respiratory distress.      Breath sounds: Normal breath sounds.   Abdominal:      General: Abdomen is flat. There is no distension.      Palpations: Abdomen is soft. There is no mass.      Tenderness: There is no abdominal tenderness.      Hernia: No hernia is present.   Musculoskeletal:         General: No tenderness. Normal range of motion.      Cervical back: Normal range of motion and neck supple. No rigidity or tenderness.   Skin:     General: Skin is warm and dry.      Findings: No rash.   Neurological:      General: No focal deficit present.      Mental Status: She is alert.      Motor: No weakness.      Gait: Gait normal.   Psychiatric:         Mood and Affect: Mood normal.         Behavior: Behavior normal.       Administrative Statements

## 2024-06-10 NOTE — PROGRESS NOTES
"Ambulatory Visit  Name: Madeleine Short      : 1970      MRN: [id number]  Encounter Provider: Tacho Clement MD  Encounter Date: 6/10/2024   Encounter department: [facility]    Assessment & Plan   1. Acquired hypothyroidism  2. Encounter for follow-up surveillance of thyroid cancer  3. Vitamin D deficiency  4. TMJ disease  5. Hypertriglyceridemia       History of Present Illness   {Disappearing Hyperlinks I Encounters * My Last Note * Since Last Visit * History :25260}  HPI    Review of Systems  {Select to Display PMH (Optional):50076}  Objective   {Disappearing Hyperlinks   Review Vitals * Enter New Vitals * Results Review * Labs * Imaging * Cardiology * Procedures * Lung Cancer Screening :41362}  /74   Temp 97.5 °F (36.4 °C) (Temporal)   Ht 5' 5\" (1.651 m)   Wt 81.9 kg (180 lb 9.6 oz)   BMI 30.05 kg/m²     Physical Exam  Administrative Statements {Disappearing Hyperlinks I  Level of Service * PCMH/PCSP:23870}  {Time Spent Statement (Optional):23632}        " Dry

## 2024-08-06 ENCOUNTER — TELEPHONE (OUTPATIENT)
Age: 54
End: 2024-08-06

## 2024-08-06 DIAGNOSIS — Z12.11 SCREENING FOR COLON CANCER: Primary | ICD-10-CM

## 2024-08-06 NOTE — TELEPHONE ENCOUNTER
Patient requesting for samples of pill prep. Warm transferred to Dayana for further assistance    +AICD right upper ant chest wall

## 2024-08-06 NOTE — TELEPHONE ENCOUNTER
Call transferred from Gile (Patient access). Patient requesting change of prep for upcoming colonoscopy with Dr. Jaiyeola. Per patient had bariatric surgery and can't take that amount of fluid, also stated last time received samples for Sutab. Patient requesting switching prep to Sutab, also requesting samples.    Thanks,  Dayana MIRANDA

## 2024-08-07 NOTE — TELEPHONE ENCOUNTER
Called patient and explained Rx was sent to her pharmacy for Sutab, also if is too pricey to call us back to send Clenpiq instead.

## 2024-08-12 ENCOUNTER — TELEPHONE (OUTPATIENT)
Dept: GASTROENTEROLOGY | Facility: MEDICAL CENTER | Age: 54
End: 2024-08-12

## 2024-08-21 ENCOUNTER — TELEPHONE (OUTPATIENT)
Dept: GASTROENTEROLOGY | Facility: CLINIC | Age: 54
End: 2024-08-21

## 2024-09-03 ENCOUNTER — ANESTHESIA (OUTPATIENT)
Dept: ANESTHESIOLOGY | Facility: HOSPITAL | Age: 54
End: 2024-09-03

## 2024-09-03 ENCOUNTER — ANESTHESIA EVENT (OUTPATIENT)
Dept: ANESTHESIOLOGY | Facility: HOSPITAL | Age: 54
End: 2024-09-03

## 2024-09-12 ENCOUNTER — APPOINTMENT (OUTPATIENT)
Age: 54
End: 2024-09-12
Payer: COMMERCIAL

## 2024-09-12 DIAGNOSIS — E03.9 ACQUIRED HYPOTHYROIDISM: Primary | ICD-10-CM

## 2024-09-12 LAB
T4 FREE SERPL-MCNC: 1.42 NG/DL (ref 0.61–1.12)
TSH SERPL DL<=0.05 MIU/L-ACNC: 0.42 UIU/ML (ref 0.45–4.5)

## 2024-09-12 PROCEDURE — 84439 ASSAY OF FREE THYROXINE: CPT | Performed by: INTERNAL MEDICINE

## 2024-09-12 PROCEDURE — 84443 ASSAY THYROID STIM HORMONE: CPT | Performed by: INTERNAL MEDICINE

## 2024-09-12 PROCEDURE — 36415 COLL VENOUS BLD VENIPUNCTURE: CPT | Performed by: INTERNAL MEDICINE

## 2024-09-16 ENCOUNTER — ANESTHESIA (OUTPATIENT)
Dept: GASTROENTEROLOGY | Facility: MEDICAL CENTER | Age: 54
End: 2024-09-16
Payer: COMMERCIAL

## 2024-09-16 ENCOUNTER — ANESTHESIA EVENT (OUTPATIENT)
Dept: GASTROENTEROLOGY | Facility: MEDICAL CENTER | Age: 54
End: 2024-09-16
Payer: COMMERCIAL

## 2024-09-16 ENCOUNTER — HOSPITAL ENCOUNTER (OUTPATIENT)
Dept: GASTROENTEROLOGY | Facility: MEDICAL CENTER | Age: 54
Setting detail: OUTPATIENT SURGERY
Discharge: HOME | End: 2024-09-16
Payer: COMMERCIAL

## 2024-09-16 VITALS
SYSTOLIC BLOOD PRESSURE: 115 MMHG | BODY MASS INDEX: 29.99 KG/M2 | DIASTOLIC BLOOD PRESSURE: 61 MMHG | HEART RATE: 60 BPM | HEIGHT: 65 IN | WEIGHT: 180 LBS | TEMPERATURE: 96.4 F | RESPIRATION RATE: 20 BRPM | OXYGEN SATURATION: 100 %

## 2024-09-16 DIAGNOSIS — Z12.11 SCREENING FOR COLON CANCER: ICD-10-CM

## 2024-09-16 PROCEDURE — G0105 COLORECTAL SCRN; HI RISK IND: HCPCS | Performed by: INTERNAL MEDICINE

## 2024-09-16 RX ORDER — PROPOFOL 10 MG/ML
INJECTION, EMULSION INTRAVENOUS CONTINUOUS PRN
Status: DISCONTINUED | OUTPATIENT
Start: 2024-09-16 | End: 2024-09-16

## 2024-09-16 RX ORDER — PROPOFOL 10 MG/ML
INJECTION, EMULSION INTRAVENOUS AS NEEDED
Status: DISCONTINUED | OUTPATIENT
Start: 2024-09-16 | End: 2024-09-16

## 2024-09-16 RX ORDER — SODIUM CHLORIDE 9 MG/ML
125 INJECTION, SOLUTION INTRAVENOUS CONTINUOUS
Status: DISCONTINUED | OUTPATIENT
Start: 2024-09-16 | End: 2024-09-20 | Stop reason: HOSPADM

## 2024-09-16 RX ADMIN — PROPOFOL 100 MG: 10 INJECTION, EMULSION INTRAVENOUS at 08:05

## 2024-09-16 RX ADMIN — SODIUM CHLORIDE 125 ML/HR: 0.9 INJECTION, SOLUTION INTRAVENOUS at 07:30

## 2024-09-16 RX ADMIN — Medication 40 MG: at 08:08

## 2024-09-16 RX ADMIN — PROPOFOL 100 MCG/KG/MIN: 10 INJECTION, EMULSION INTRAVENOUS at 08:05

## 2024-09-16 NOTE — ANESTHESIA POSTPROCEDURE EVALUATION
Post-Op Assessment Note    CV Status:  Stable    Pain management: adequate       Mental Status:  Alert and awake   Hydration Status:  Euvolemic   PONV Controlled:  Controlled   Airway Patency:  Patent     Post Op Vitals Reviewed: Yes    No anethesia notable event occurred.    Staff: CRNA               BP 99/50 (09/16/24 0818)    Temp      Pulse 57 (09/16/24 0818)   Resp 16 (09/16/24 0818)    SpO2 100 % (09/16/24 0818)

## 2024-09-16 NOTE — ANESTHESIA PREPROCEDURE EVALUATION
Procedure:  COLONOSCOPY    Relevant Problems   ANESTHESIA (within normal limits)      CARDIO   (+) Hypertriglyceridemia      ENDO   (+) Acquired hypothyroidism      MUSCULOSKELETAL   (+) Chronic right-sided low back pain without sciatica   (+) Generalized osteoarthritis of multiple sites      NEURO/PSYCH   (+) Chronic right-sided low back pain without sciatica      Oncology   (+) History of thyroid cancer      Surgery/Wound/Pain   (+) History of partial gastrectomy   (+) Status post bariatric surgery      Orthopedic/Musculoskeletal   (+) Lumbar radiculopathy      Other   (+) Screening for colon cancer        Physical Exam    Airway    Mallampati score: II  TM Distance: >3 FB  Neck ROM: full     Dental        Cardiovascular  Rhythm: regular, Rate: normal, Cardiovascular exam normal    Pulmonary  Pulmonary exam normal Breath sounds clear to auscultation    Other Findings  post-pubertal.      Anesthesia Plan  ASA Score- 3     Anesthesia Type- IV sedation with anesthesia with ASA Monitors.         Additional Monitors:     Airway Plan:            Plan Factors-Exercise tolerance (METS): >4 METS.    Chart reviewed.   Existing labs reviewed. Patient summary reviewed.    Patient is not a current smoker.              Induction-     Postoperative Plan-         Informed Consent- Anesthetic plan and risks discussed with patient.  I personally reviewed this patient with the CRNA. Discussed and agreed on the Anesthesia Plan with the CRNA..

## 2024-09-16 NOTE — H&P
H&P - Gastroenterology   Name: Madeleine Short 54 y.o. female I MRN: [id number]  Unit/Bed#:  I Date of Admission: 2024   Date of Service: 2024 I Hospital Day: 0     Assessment & Plan   This is a 54 y.o. year old female here for colonoscopy, and she is stable and optimized for her procedure.    History of Present Illness    Madeleine Short is a 54 y.o. year old female who presents for history of colon polyps    REVIEW OF SYSTEMS: Per the HPI, and otherwise unremarkable.    Historical Information   Past Medical History:   Diagnosis Date    Abnormal Pap smear of cervix     Arthritis     Cervical dysplasia     LAST ASSESSED: [id number]    Disease of thyroid gland     Generalized anxiety disorder 2012    HPV (human papilloma virus) infection     Hypothyroid     Minimally invasive follicular carcinoma of thyroid (HCC) 2021    Pilonidal disease of philip cleft 2022     Past Surgical History:   Procedure Laterality Date    BILATERAL SALPINGOOPHORECTOMY Bilateral     CERVICAL BIOPSY  W/ LOOP ELECTRODE EXCISION      CHOLECYSTECTOMY      COLPOSCOPY      GASTRECTOMY SLEEVE LAPAROSCOPIC      HYSTERECTOMY       age 36    LYMPH NODE BIOPSY      OOPHORECTOMY Bilateral     age 36    THYROIDECTOMY Bilateral 2021    Procedure: THYROIDECTOMY, total;  Surgeon: Jonatan Strange MD;  Location: [facility];  Service: Surgical Oncology    TUBAL LIGATION      US GUIDED LYMPH NODE BIOPSY LEFT  2022    US GUIDED THYROID BIOPSY  2021    US GUIDED THYROID BIOPSY  06/15/2021     Social History     Tobacco Use    Smoking status: Former     Current packs/day: 0.00     Average packs/day: 0.3 packs/day for 2.0 years (0.5 ttl pk-yrs)     Types: Cigarettes     Start date: 1988     Quit date: 1990     Years since quittin.7    Smokeless tobacco: Never   Vaping Use    Vaping status: Never Used   Substance and Sexual Activity    Alcohol use: Yes     Comment: SOCIAL    Drug use: No      "Comment: SUBSTANCE ABUSE IN THE PAST    Sexual activity: Not Currently     Partners: Male     Birth control/protection: Post-menopausal, Female Sterilization     E-Cigarette/Vaping    E-Cigarette Use Never User      E-Cigarette/Vaping Substances    Nicotine No     THC No     CBD No     Flavoring No     Other No     Unknown No      Family history non-contributory    Meds/Allergies     Current Outpatient Medications:     Cholecalciferol (Vitamin D3) 50 MCG (2000 UT) capsule    levothyroxine 137 mcg tablet    Sodium Sulfate-Mag Sulfate-KCl 9937-119-526 MG TABS    Current Facility-Administered Medications:     sodium chloride 0.9 % infusion, 125 mL/hr, Intravenous, Continuous, 125 mL/hr at 09/16/24 0730  Allergies   Allergen Reactions    Ace Inhibitors Cough    Compazine [Prochlorperazine] Other (See Comments)     \"makes me crazy\"    Phenothiazines Confusion    Quinolones Other (See Comments)     Patient does not remember reaction    Sulfamethoxazole-Trimethoprim Blisters     In mouth       Objective   /75   Pulse 59   Temp (!) 96.4 °F (35.8 °C) (Temporal)   Resp 16   Ht 5' 5\" (1.651 m)   Wt 81.6 kg (180 lb)   SpO2 100%   BMI 29.95 kg/m²     Physical Exam  Gen: NAD  Head: NCAT  CV: RRR  CHEST: Clear  ABD: soft, NT/ND  EXT: no edema  "

## 2024-10-16 PROBLEM — Z12.11 SCREENING FOR COLON CANCER: Status: RESOLVED | Noted: 2024-09-16 | Resolved: 2024-10-16

## 2024-10-30 DIAGNOSIS — Z85.850 HX OF THYROID CANCER: ICD-10-CM

## 2024-10-30 DIAGNOSIS — E03.9 ACQUIRED HYPOTHYROIDISM: ICD-10-CM

## 2024-10-30 RX ORDER — LEVOTHYROXINE SODIUM 137 UG/1
137 TABLET ORAL DAILY
Qty: 90 TABLET | Refills: 0 | Status: SHIPPED | OUTPATIENT
Start: 2024-10-30

## 2024-11-05 ENCOUNTER — TELEPHONE (OUTPATIENT)
Dept: FAMILY MEDICINE CLINIC | Facility: CLINIC | Age: 54
End: 2024-11-05

## 2024-11-11 ENCOUNTER — HOSPITAL ENCOUNTER (OUTPATIENT)
Dept: ULTRASOUND IMAGING | Facility: HOSPITAL | Age: 54
Discharge: HOME | End: 2024-11-11
Attending: SURGERY
Payer: COMMERCIAL

## 2024-11-11 DIAGNOSIS — Z85.850 ENCOUNTER FOR FOLLOW-UP SURVEILLANCE OF THYROID CANCER: ICD-10-CM

## 2024-11-11 DIAGNOSIS — Z08 ENCOUNTER FOR FOLLOW-UP SURVEILLANCE OF THYROID CANCER: ICD-10-CM

## 2024-11-11 PROCEDURE — 76536 US EXAM OF HEAD AND NECK: CPT

## 2024-11-13 ENCOUNTER — APPOINTMENT (OUTPATIENT)
Age: 54
End: 2024-11-13
Payer: COMMERCIAL

## 2024-11-13 DIAGNOSIS — E55.9 VITAMIN D DEFICIENCY: ICD-10-CM

## 2024-11-13 DIAGNOSIS — Z85.850 ENCOUNTER FOR FOLLOW-UP SURVEILLANCE OF THYROID CANCER: ICD-10-CM

## 2024-11-13 DIAGNOSIS — Z08 ENCOUNTER FOR FOLLOW-UP SURVEILLANCE OF THYROID CANCER: ICD-10-CM

## 2024-11-13 DIAGNOSIS — E78.1 HYPERTRIGLYCERIDEMIA: ICD-10-CM

## 2024-11-13 LAB
25(OH)D3 SERPL-MCNC: 28.2 NG/ML (ref 30–100)
ALBUMIN SERPL BCG-MCNC: 4.4 G/DL (ref 3.5–5)
ALP SERPL-CCNC: 64 U/L (ref 34–104)
ALT SERPL W P-5'-P-CCNC: 11 U/L (ref 7–52)
ANION GAP SERPL CALCULATED.3IONS-SCNC: 8 MMOL/L (ref 4–13)
AST SERPL W P-5'-P-CCNC: 21 U/L (ref 13–39)
BILIRUB SERPL-MCNC: 0.59 MG/DL (ref 0.2–1)
BUN SERPL-MCNC: 20 MG/DL (ref 5–25)
CALCIUM SERPL-MCNC: 9 MG/DL (ref 8.4–10.2)
CHLORIDE SERPL-SCNC: 101 MMOL/L (ref 96–108)
CHOLEST SERPL-MCNC: 220 MG/DL (ref ?–200)
CO2 SERPL-SCNC: 30 MMOL/L (ref 21–32)
CREAT SERPL-MCNC: 0.89 MG/DL (ref 0.6–1.3)
GFR SERPL CREATININE-BSD FRML MDRD: 74 ML/MIN/1.73SQ M
GLUCOSE P FAST SERPL-MCNC: 83 MG/DL (ref 65–99)
HDLC SERPL-MCNC: 76 MG/DL
LDLC SERPL CALC-MCNC: 126 MG/DL (ref 0–100)
NONHDLC SERPL-MCNC: 144 MG/DL
POTASSIUM SERPL-SCNC: 3.9 MMOL/L (ref 3.5–5.3)
PROT SERPL-MCNC: 7.5 G/DL (ref 6.4–8.4)
SODIUM SERPL-SCNC: 139 MMOL/L (ref 135–147)
T4 FREE SERPL-MCNC: 1.1 NG/DL (ref 0.61–1.12)
T4 SERPL-MCNC: 9.67 UG/DL (ref 6.09–12.23)
TRIGL SERPL-MCNC: 90 MG/DL (ref ?–150)
TSH SERPL DL<=0.05 MIU/L-ACNC: 3.23 UIU/ML (ref 0.45–4.5)

## 2024-11-13 PROCEDURE — 80061 LIPID PANEL: CPT

## 2024-11-13 PROCEDURE — 82306 VITAMIN D 25 HYDROXY: CPT

## 2024-11-13 PROCEDURE — 80053 COMPREHEN METABOLIC PANEL: CPT

## 2024-11-13 PROCEDURE — 84439 ASSAY OF FREE THYROXINE: CPT

## 2024-11-13 PROCEDURE — 84479 ASSAY OF THYROID (T3 OR T4): CPT

## 2024-11-13 PROCEDURE — 84443 ASSAY THYROID STIM HORMONE: CPT

## 2024-11-13 PROCEDURE — 36415 COLL VENOUS BLD VENIPUNCTURE: CPT

## 2024-11-13 PROCEDURE — 84436 ASSAY OF TOTAL THYROXINE: CPT

## 2024-11-16 LAB — T3RU NFR SERPL: 26 % (ref 24–39)

## 2024-11-18 ENCOUNTER — OFFICE VISIT (OUTPATIENT)
Dept: SURGICAL ONCOLOGY | Facility: CLINIC | Age: 54
End: 2024-11-18
Payer: COMMERCIAL

## 2024-11-18 ENCOUNTER — RESULTS FOLLOW-UP (OUTPATIENT)
Dept: FAMILY MEDICINE CLINIC | Facility: CLINIC | Age: 54
End: 2024-11-18

## 2024-11-18 VITALS
OXYGEN SATURATION: 100 % | TEMPERATURE: 96.5 F | SYSTOLIC BLOOD PRESSURE: 128 MMHG | HEART RATE: 69 BPM | HEIGHT: 65 IN | DIASTOLIC BLOOD PRESSURE: 82 MMHG | BODY MASS INDEX: 30.82 KG/M2 | WEIGHT: 185 LBS

## 2024-11-18 DIAGNOSIS — Z85.850 HISTORY OF THYROID CANCER: ICD-10-CM

## 2024-11-18 DIAGNOSIS — Z85.850 ENCOUNTER FOR FOLLOW-UP SURVEILLANCE OF THYROID CANCER: Primary | ICD-10-CM

## 2024-11-18 DIAGNOSIS — Z08 ENCOUNTER FOR FOLLOW-UP SURVEILLANCE OF THYROID CANCER: Primary | ICD-10-CM

## 2024-11-18 PROCEDURE — 99213 OFFICE O/P EST LOW 20 MIN: CPT

## 2024-11-18 NOTE — LETTER
November 18, 2024     CHRIS Niño  [address]  [address]  [place] [address]    Patient: Madeleine Short   YOB: 1970   Date of Visit: 11/18/2024       Dear Dr. Whitney:    Thank you for referring Madeleine Short to me for evaluation. Below are my notes for this consultation.    If you have questions, please do not hesitate to call me. I look forward to following your patient along with you.         Sincerely,        CHRIS Deutsch        CC: No Recipients    CHRIS Deutsch  11/18/2024 10:12 AM  Sign when Signing Visit               Surgical Oncology Follow Up       [facility]  [address] [address]  [phone]    Madeleine Short  1970  [id number]  [facility]  [address] [address]  [phone]    1. Encounter for follow-up surveillance of thyroid cancer  2. History of thyroid cancer  Assessment & Plan:  There is no evidence of disease recurrence at this time.  Her most recent TSH was above expected range, and patient states she was instructed to decrease her levothyroxine dose.  I have explained that we would like her TSH supressed below 1.0 for the first 5 years after diagnosis.  I recommend she resume prior dosing as long as it does not cause a return of palpitations. She will follow-up with her endocrinologist next week.  I will see her again in 1 year with repeat US.  Orders:  -     US head neck lymph node mapping; Future; Expected date: 11/11/2025         Chief Complaint   Patient presents with   • Follow-up       Return in about 1 year (around 11/18/2025) for Office Visit, Imaging - See orders.      Oncology History   History of thyroid cancer   9/21/2021 Surgery    Total thyroidectomy:  - Right lobe, 4.5 cm lesion:  Grossly encapsulated, minimally  invasive, follicular carcinoma; confined to thyroid.     - Left lobe:  Two foci of papillary microcarcinoma (4 mm and 3 mm, respectively) both confined        to thyroid.      9/21/2021 -  Cancer Staged    Staging form: Thyroid - Differentiated and Anaplastic, AJCC 8th Edition  - Pathologic stage from 9/21/2021: Stage I (pT3, pNX, cM0, Age at diagnosis: < 55 years) - Signed by CHRIS Deutsch on 10/31/2024  Stage prefix: Initial diagnosis  Lymph node metastasis: Unknown       2/3/2023 - 2/3/2023 Radiation    Radioactive iodine therapy           History of Present Illness: This is a 53 y/o female who returns to the office today in follow-up for her history of thyroid cancer.  She is currently HUGH at 3 years.  She reports that, in September, she started experiencing symptoms which she attributed to her thyroid and was told to omit her Sunday dose of levothyroxine.   Several weeks later, she experienced a near-syncopal episode at work, and is now being worked up for a cardiac abnormality, which may have been the cause of her other symptoms as well.  Recent TSH labs show she is not currently suppressed.   She denies any difficulty swallowing, voice changes or new lumps in her neck.  US was performed on November 11, and I have reviewed these results with the patient.      Review of Systems   Constitutional:  Negative for activity change, appetite change, fatigue and unexpected weight change.   HENT: Negative.  Negative for trouble swallowing and voice change.    Respiratory: Negative.  Negative for cough and shortness of breath.    Cardiovascular:  Positive for palpitations.   Gastrointestinal: Negative.    Musculoskeletal:  Positive for arthralgias.   Skin: Negative.  Negative for color change.   Neurological: Negative.  Negative for dizziness and headaches.   Hematological: Negative.  Negative for adenopathy.   Psychiatric/Behavioral: Negative.             Patient Active Problem List   Diagnosis   • Generalized  osteoarthritis of multiple sites   • Acquired hypothyroidism   • Stress incontinence, female   • Surgical menopause   • Lumbar disc disease   • Chronic right-sided low back pain without sciatica   • Lumbar radiculopathy   • Hypertriglyceridemia   • Status post bariatric surgery   • History of thyroid cancer   • Thiamine deficiency   • History of partial gastrectomy   • Vaginal atrophy   • Encounter for follow-up surveillance of thyroid cancer   • Vitamin D deficiency     Past Medical History:   Diagnosis Date   • Abnormal Pap smear of cervix    • Arthritis    • Cervical dysplasia     LAST ASSESSED: 2016   • Disease of thyroid gland    • Generalized anxiety disorder 2012   • HPV (human papilloma virus) infection    • Hypothyroid    • Minimally invasive follicular carcinoma of thyroid (HCC) 2021   • Pilonidal disease of philip cleft 2022     Past Surgical History:   Procedure Laterality Date   • BILATERAL SALPINGOOPHORECTOMY Bilateral    • CERVICAL BIOPSY  W/ LOOP ELECTRODE EXCISION     • CHOLECYSTECTOMY     • COLPOSCOPY     • GASTRECTOMY SLEEVE LAPAROSCOPIC     • HYSTERECTOMY       age 36   • LYMPH NODE BIOPSY     • OOPHORECTOMY Bilateral     age 36   • THYROIDECTOMY Bilateral 2021    Procedure: THYROIDECTOMY, total;  Surgeon: Jonatan Strange MD;  Location: [facility];  Service: Surgical Oncology   • TUBAL LIGATION     • US GUIDED LYMPH NODE BIOPSY LEFT  2022   • US GUIDED THYROID BIOPSY  2021   • US GUIDED THYROID BIOPSY  06/15/2021     Family History   Problem Relation Age of Onset   • COPD Mother    • Alcohol abuse Mother    • Substance Abuse Mother    • Mental illness Mother    • Depression Mother    • Coronary artery disease Mother    • Asthma Mother    • Anxiety disorder Mother    • Bipolar disorder Mother    • Drug abuse Mother    • Psychiatric Illness Mother    • Schizophrenia Mother    • Suicide Attempts Mother    • Diabetes Father    • Hypertension Father     • No Known Problems Sister    • No Known Problems Daughter    • Breast cancer Maternal Grandmother 63   • No Known Problems Maternal Grandfather    • No Known Problems Paternal Grandmother    • Diabetes type I Paternal Grandfather    • No Known Problems Maternal Aunt    • No Known Problems Maternal Aunt    • No Known Problems Paternal Aunt    • Diabetes Family    • Hypertension Family    • Skin cancer Family    • No Known Problems Son      Social History     Socioeconomic History   • Marital status: Single     Spouse name: Not on file   • Number of children: Not on file   • Years of education: Not on file   • Highest education level: Not on file   Occupational History   • Not on file   Tobacco Use   • Smoking status: Former     Current packs/day: 0.00     Average packs/day: 0.3 packs/day for 2.0 years (0.5 ttl pk-yrs)     Types: Cigarettes     Start date: 1988     Quit date: 1990     Years since quittin.9   • Smokeless tobacco: Never   Vaping Use   • Vaping status: Never Used   Substance and Sexual Activity   • Alcohol use: Yes     Comment: SOCIAL   • Drug use: No     Comment: SUBSTANCE ABUSE IN THE PAST   • Sexual activity: Not Currently     Partners: Male     Birth control/protection: Post-menopausal, Female Sterilization   Other Topics Concern   • Not on file   Social History Narrative    USES SAFETY EQUIPMENT - [employer]     Social Drivers of Health     Financial Resource Strain: Low Risk  (4/15/2024)    Overall Financial Resource Strain (CARDIA)    • Difficulty of Paying Living Expenses: Not hard at all   Recent Concern: Financial Resource Strain - Medium Risk (2024)    Overall Financial Resource Strain (CARDIA)    • Difficulty of Paying Living Expenses: Somewhat hard   Food Insecurity: No Food Insecurity (2024)    Nursing - Inadequate Food Risk Classification    • Worried About Running Out of Food in the Last Year: Never true    • Ran Out of Food in the Last Year: Never true    • Ran  "Out of Food in the Last Year: Not on file   Transportation Needs: No Transportation Needs (4/15/2024)    PRAPARE - Transportation    • Lack of Transportation (Medical): No    • Lack of Transportation (Non-Medical): No   Physical Activity: Not on file   Stress: Not on file   Social Connections: Not on file   Intimate Partner Violence: Not on file   Housing Stability: Low Risk  (4/15/2024)    Housing Stability Vital Sign    • Unable to Pay for Housing in the Last Year: No    • Number of Times Moved in the Last Year: 1    • Homeless in the Last Year: No   Recent Concern: Housing Stability - High Risk (4/14/2024)    Housing Stability Vital Sign    • Unable to Pay for Housing in the Last Year: Yes    • Number of Places Lived in the Last Year: 1    • Unstable Housing in the Last Year: No       Current Outpatient Medications:   •  Cholecalciferol (Vitamin D3) 50 MCG (2000 UT) capsule, Take 1 capsule by mouth once daily, Disp: 90 capsule, Rfl: 1  •  levothyroxine 137 mcg tablet, Take 1 tablet by mouth once daily, Disp: 90 tablet, Rfl: 0  Allergies   Allergen Reactions   • Ace Inhibitors Cough   • Compazine [Prochlorperazine] Other (See Comments)     \"makes me crazy\"   • Phenothiazines Confusion   • Quinolones Other (See Comments)     Patient does not remember reaction   • Sulfamethoxazole-Trimethoprim Blisters     In mouth     Vitals:    11/18/24 0937   BP: 128/82   Pulse: 69   Temp: (!) 96.5 °F (35.8 °C)   SpO2: 100%       Physical Exam  Vitals reviewed.   Constitutional:       General: She is not in acute distress.     Appearance: Normal appearance. She is normal weight. She is not ill-appearing or toxic-appearing.   HENT:      Head: Normocephalic and atraumatic.      Nose: Nose normal.      Mouth/Throat:      Mouth: Mucous membranes are moist.   Eyes:      General: No scleral icterus.  Cardiovascular:      Rate and Rhythm: Normal rate.   Pulmonary:      Effort: Pulmonary effort is normal.   Musculoskeletal:         " General: Signs of injury (right wrist in brace) present.      Cervical back: Normal range of motion and neck supple. No rigidity or tenderness.   Lymphadenopathy:      Head:      Right side of head: No submandibular adenopathy.      Left side of head: No submandibular adenopathy.      Cervical: No cervical adenopathy.      Upper Body:      Right upper body: No supraclavicular adenopathy.      Left upper body: No supraclavicular adenopathy.   Skin:     General: Skin is warm and dry.   Neurological:      General: No focal deficit present.      Mental Status: She is alert and oriented to person, place, and time.   Psychiatric:         Mood and Affect: Mood normal.         Behavior: Behavior normal.         Thought Content: Thought content normal.         Judgment: Judgment normal.               Labs:  Collected Updated Procedure    11/13/2024 1028 11/13/2024 2051 TSH, 3rd generation [[id number]]   Blood    Component Value Units   TSH 3RD GENERATON 3.230  uIU/mL          11/13/2024 1028 11/13/2024 2051 T4, free [[id number]]    Blood    Component Value Units   Free T4 1.10  ng/dL          Imaging  US head neck lymph node mapping  Result Date: 11/15/2024  Narrative: NECK ULTRASOUND INDICATION: Z08: Encounter for follow-up examination after completed treatment for malignant neoplasm Z85.850: Personal history of malignant neoplasm of thyroid. Post thyroidectomy (September 21, 2021) and radioactive iodine therapy (February 2023). COMPARISON: Neck ultrasound November 6, 2023 FINDINGS: Ultrasound of the thyroidectomy bed and cervical lymph node chains was performed with a high frequency linear transducer. There is no suspicion of recurrent mass in the thyroidectomy bed. Lymph nodes maintain normal morphologic contour, echogenicity and short axis dimensions of less than 0.7 cm. No evidence for microcalcification or focal nodularity.     Impression: No evidence of recurrent or metastatic disease. Workstation performed: [id number]        I personally reviewed and interpreted the above laboratory and imaging data.

## 2024-11-18 NOTE — PROGRESS NOTES
Surgical Oncology Follow Up       [facility] [place]  [address] [facility] [address]  [phone]    Madeleine Short  1970  [id number]  [facility] [place]  [address] [facility] [address]  [phone]    1. Encounter for follow-up surveillance of thyroid cancer  2. History of thyroid cancer  Assessment & Plan:  There is no evidence of disease recurrence at this time.  Her most recent TSH was above expected range, and patient states she was instructed to decrease her levothyroxine dose.  I have explained that we would like her TSH supressed below 1.0 for the first 5 years after diagnosis.  I recommend she resume prior dosing as long as it does not cause a return of palpitations. She will follow-up with her endocrinologist next week.  I will see her again in 1 year with repeat US.  Orders:  -     US head neck lymph node mapping; Future; Expected date: 11/11/2025         Chief Complaint   Patient presents with    Follow-up       Return in about 1 year (around 11/18/2025) for Office Visit, Imaging - See orders.      Oncology History   History of thyroid cancer   9/21/2021 Surgery    Total thyroidectomy:  - Right lobe, 4.5 cm lesion:  Grossly encapsulated, minimally invasive, follicular carcinoma; confined to thyroid.     - Left lobe:  Two foci of papillary microcarcinoma (4 mm and 3 mm, respectively) both confined        to thyroid.      9/21/2021 -  Cancer Staged    Staging form: Thyroid - Differentiated and Anaplastic, AJCC 8th Edition  - Pathologic stage from 9/21/2021: Stage I (pT3, pNX, cM0, Age at diagnosis: < 55 years) - Signed by CHRIS Deutsch on 10/31/2024  Stage prefix: Initial diagnosis  Lymph node metastasis: Unknown       2/3/2023 - 2/3/2023 Radiation    Radioactive iodine therapy           History of Present Illness: This is a 53 y/o  female who returns to the office today in follow-up for her history of thyroid cancer.  She is currently HUGH at 3 years.  She reports that, in September, she started experiencing symptoms which she attributed to her thyroid and was told to omit her Sunday dose of levothyroxine.   Several weeks later, she experienced a near-syncopal episode at work, and is now being worked up for a cardiac abnormality, which may have been the cause of her other symptoms as well.  Recent TSH labs show she is not currently suppressed.   She denies any difficulty swallowing, voice changes or new lumps in her neck.  US was performed on November 11, and I have reviewed these results with the patient.      Review of Systems   Constitutional:  Negative for activity change, appetite change, fatigue and unexpected weight change.   HENT: Negative.  Negative for trouble swallowing and voice change.    Respiratory: Negative.  Negative for cough and shortness of breath.    Cardiovascular:  Positive for palpitations.   Gastrointestinal: Negative.    Musculoskeletal:  Positive for arthralgias.   Skin: Negative.  Negative for color change.   Neurological: Negative.  Negative for dizziness and headaches.   Hematological: Negative.  Negative for adenopathy.   Psychiatric/Behavioral: Negative.             Patient Active Problem List   Diagnosis    Generalized osteoarthritis of multiple sites    Acquired hypothyroidism    Stress incontinence, female    Surgical menopause    Lumbar disc disease    Chronic right-sided low back pain without sciatica    Lumbar radiculopathy    Hypertriglyceridemia    Status post bariatric surgery    History of thyroid cancer    Thiamine deficiency    History of partial gastrectomy    Vaginal atrophy    Encounter for follow-up surveillance of thyroid cancer    Vitamin D deficiency     Past Medical History:   Diagnosis Date    Abnormal Pap smear of cervix     Arthritis     Cervical dysplasia     LAST ASSESSED: [id number]     Disease of thyroid gland     Generalized anxiety disorder 2012    HPV (human papilloma virus) infection     Hypothyroid     Minimally invasive follicular carcinoma of thyroid (HCC) 2021    Pilonidal disease of hpilip cleft 2022     Past Surgical History:   Procedure Laterality Date    BILATERAL SALPINGOOPHORECTOMY Bilateral     CERVICAL BIOPSY  W/ LOOP ELECTRODE EXCISION      CHOLECYSTECTOMY      COLPOSCOPY      GASTRECTOMY SLEEVE LAPAROSCOPIC  2020    HYSTERECTOMY       age 36    LYMPH NODE BIOPSY      OOPHORECTOMY Bilateral     age 36    THYROIDECTOMY Bilateral 2021    Procedure: THYROIDECTOMY, total;  Surgeon: Jonatan Strange MD;  Location: [facility];  Service: Surgical Oncology    TUBAL LIGATION      US GUIDED LYMPH NODE BIOPSY LEFT  2022    US GUIDED THYROID BIOPSY  2021    US GUIDED THYROID BIOPSY  06/15/2021     Family History   Problem Relation Age of Onset    COPD Mother     Alcohol abuse Mother     Substance Abuse Mother     Mental illness Mother     Depression Mother     Coronary artery disease Mother     Asthma Mother     Anxiety disorder Mother     Bipolar disorder Mother     Drug abuse Mother     Psychiatric Illness Mother     Schizophrenia Mother     Suicide Attempts Mother     Diabetes Father     Hypertension Father     No Known Problems Sister     No Known Problems Daughter     Breast cancer Maternal Grandmother 63    No Known Problems Maternal Grandfather     No Known Problems Paternal Grandmother     Diabetes type I Paternal Grandfather     No Known Problems Maternal Aunt     No Known Problems Maternal Aunt     No Known Problems Paternal Aunt     Diabetes Family     Hypertension Family     Skin cancer Family     No Known Problems Son      Social History     Socioeconomic History    Marital status: Single     Spouse name: Not on file    Number of children: Not on file    Years of education: Not on file    Highest education level: Not on file    Occupational History    Not on file   Tobacco Use    Smoking status: Former     Current packs/day: 0.00     Average packs/day: 0.3 packs/day for 2.0 years (0.5 ttl pk-yrs)     Types: Cigarettes     Start date: 1988     Quit date: 1990     Years since quittin.9    Smokeless tobacco: Never   Vaping Use    Vaping status: Never Used   Substance and Sexual Activity    Alcohol use: Yes     Comment: SOCIAL    Drug use: No     Comment: SUBSTANCE ABUSE IN THE PAST    Sexual activity: Not Currently     Partners: Male     Birth control/protection: Post-menopausal, Female Sterilization   Other Topics Concern    Not on file   Social History Narrative    USES SAFETY EQUIPMENT - [employer]     Social Drivers of Health     Financial Resource Strain: Low Risk  (4/15/2024)    Overall Financial Resource Strain (CARDIA)     Difficulty of Paying Living Expenses: Not hard at all   Recent Concern: Financial Resource Strain - Medium Risk (2024)    Overall Financial Resource Strain (CARDIA)     Difficulty of Paying Living Expenses: Somewhat hard   Food Insecurity: No Food Insecurity (2024)    Nursing - Inadequate Food Risk Classification     Worried About Running Out of Food in the Last Year: Never true     Ran Out of Food in the Last Year: Never true     Ran Out of Food in the Last Year: Not on file   Transportation Needs: No Transportation Needs (4/15/2024)    PRAPARE - Transportation     Lack of Transportation (Medical): No     Lack of Transportation (Non-Medical): No   Physical Activity: Not on file   Stress: Not on file   Social Connections: Not on file   Intimate Partner Violence: Not on file   Housing Stability: Low Risk  (4/15/2024)    Housing Stability Vital Sign     Unable to Pay for Housing in the Last Year: No     Number of Times Moved in the Last Year: 1     Homeless in the Last Year: No   Recent Concern: Housing Stability - High Risk (2024)    Housing Stability Vital Sign     Unable to Pay for  "Housing in the Last Year: Yes     Number of Places Lived in the Last Year: 1     Unstable Housing in the Last Year: No       Current Outpatient Medications:     Cholecalciferol (Vitamin D3) 50 MCG (2000 UT) capsule, Take 1 capsule by mouth once daily, Disp: 90 capsule, Rfl: 1    levothyroxine 137 mcg tablet, Take 1 tablet by mouth once daily, Disp: 90 tablet, Rfl: 0  Allergies   Allergen Reactions    Ace Inhibitors Cough    Compazine [Prochlorperazine] Other (See Comments)     \"makes me crazy\"    Phenothiazines Confusion    Quinolones Other (See Comments)     Patient does not remember reaction    Sulfamethoxazole-Trimethoprim Blisters     In mouth     Vitals:    11/18/24 0937   BP: 128/82   Pulse: 69   Temp: (!) 96.5 °F (35.8 °C)   SpO2: 100%       Physical Exam  Vitals reviewed.   Constitutional:       General: She is not in acute distress.     Appearance: Normal appearance. She is normal weight. She is not ill-appearing or toxic-appearing.   HENT:      Head: Normocephalic and atraumatic.      Nose: Nose normal.      Mouth/Throat:      Mouth: Mucous membranes are moist.   Eyes:      General: No scleral icterus.  Cardiovascular:      Rate and Rhythm: Normal rate.   Pulmonary:      Effort: Pulmonary effort is normal.   Musculoskeletal:         General: Signs of injury (right wrist in brace) present.      Cervical back: Normal range of motion and neck supple. No rigidity or tenderness.   Lymphadenopathy:      Head:      Right side of head: No submandibular adenopathy.      Left side of head: No submandibular adenopathy.      Cervical: No cervical adenopathy.      Upper Body:      Right upper body: No supraclavicular adenopathy.      Left upper body: No supraclavicular adenopathy.   Skin:     General: Skin is warm and dry.   Neurological:      General: No focal deficit present.      Mental Status: She is alert and oriented to person, place, and time.   Psychiatric:         Mood and Affect: Mood normal.         " Behavior: Behavior normal.         Thought Content: Thought content normal.         Judgment: Judgment normal.               Labs:  Collected Updated Procedure    11/13/2024 1028 11/13/2024 2051 TSH, 3rd generation [[id number]]   Blood    Component Value Units   TSH 3RD GENERATON 3.230  uIU/mL          11/13/2024 1028 11/13/2024 2051 T4, free [[id number]]    Blood    Component Value Units   Free T4 1.10  ng/dL          Imaging  US head neck lymph node mapping  Result Date: 11/15/2024  Narrative: NECK ULTRASOUND INDICATION: Z08: Encounter for follow-up examination after completed treatment for malignant neoplasm Z85.850: Personal history of malignant neoplasm of thyroid. Post thyroidectomy (September 21, 2021) and radioactive iodine therapy (February 2023). COMPARISON: Neck ultrasound November 6, 2023 FINDINGS: Ultrasound of the thyroidectomy bed and cervical lymph node chains was performed with a high frequency linear transducer. There is no suspicion of recurrent mass in the thyroidectomy bed. Lymph nodes maintain normal morphologic contour, echogenicity and short axis dimensions of less than 0.7 cm. No evidence for microcalcification or focal nodularity.     Impression: No evidence of recurrent or metastatic disease. Workstation performed: [id number]       I personally reviewed and interpreted the above laboratory and imaging data.

## 2024-11-18 NOTE — ASSESSMENT & PLAN NOTE
There is no evidence of disease recurrence at this time.  Her most recent TSH was above expected range, and patient states she was instructed to decrease her levothyroxine dose.  I have explained that we would like her TSH supressed below 1.0 for the first 5 years after diagnosis.  I recommend she resume prior dosing as long as it does not cause a return of palpitations. She will follow-up with her endocrinologist next week.  I will see her again in 1 year with repeat US.

## 2024-11-26 ENCOUNTER — OFFICE VISIT (OUTPATIENT)
Dept: ENDOCRINOLOGY | Facility: CLINIC | Age: 54
End: 2024-11-26
Payer: COMMERCIAL

## 2024-11-26 VITALS
OXYGEN SATURATION: 98 % | HEART RATE: 68 BPM | WEIGHT: 182 LBS | SYSTOLIC BLOOD PRESSURE: 120 MMHG | HEIGHT: 65 IN | DIASTOLIC BLOOD PRESSURE: 80 MMHG | BODY MASS INDEX: 30.32 KG/M2

## 2024-11-26 DIAGNOSIS — E55.9 VITAMIN D DEFICIENCY: ICD-10-CM

## 2024-11-26 DIAGNOSIS — E89.0 POSTOPERATIVE HYPOTHYROIDISM: Primary | ICD-10-CM

## 2024-11-26 DIAGNOSIS — Z85.850 HISTORY OF THYROID CANCER: ICD-10-CM

## 2024-11-26 PROCEDURE — 99214 OFFICE O/P EST MOD 30 MIN: CPT

## 2024-11-26 NOTE — ASSESSMENT & PLAN NOTE
Thyroglobulin level has remained undetectable   Plan to repeat in 6 months prior to next follow up  Thyroid US did not show any sign of recurrent disease, she has repeat scheduled in 6 months  Orders:    TSH, 3rd generation; Future    T4, free; Future    Thyroglobulin; Future

## 2024-11-26 NOTE — PROGRESS NOTES
"Established Patient Progress Note      Chief Complaint   Patient presents with    Hypothyroidism    Thyroid Cancer        Impression & Plan:    Assessment & Plan  Postoperative hypothyroidism  TSH was above goal so dose was changed 1 week ago  Plan to repeat labs in 6 weeks  TSH goal 0.5-2    Orders:    TSH, 3rd generation; Future    T4, free; Future    TSH, 3rd generation; Future    T4, free; Future    History of thyroid cancer  Thyroglobulin level has remained undetectable   Plan to repeat in 6 months prior to next follow up  Thyroid US did not show any sign of recurrent disease, she has repeat scheduled in 6 months  Orders:    TSH, 3rd generation; Future    T4, free; Future    Thyroglobulin; Future    Vitamin D deficiency  Vitamin D level was low  Recommend she take supplementation regularly         History of Present Illness:   Madeleine Short is a 54 y.o. female with a history of thyroid cancer and post-surgical hypothyroidism seen in follow up.     For the Thyroid Cancer, she had thyroidectomy 9/21/2021.   Pathology report is as followed:    \"On final path, the left nodule was a minimally invasive FTC without extension. Path is 4.5x3.9x2.8cm, pT3a. No lymphovascular invasion was noted. There are two areas of micropapillary CA (3mm and 4mm in the right lobe). No concerned nodes. Background of CLT is noted, but her TG abs are negative\". She had lymph node mapping ultrasound 4/11/2022 which showed 2 suspicious looking lymph nodes on the left side of the neck.  FNA of these nodes were performed.  The left sided lymph node in zone 3 did show atypically cellular changes but thyroglobulin testing was undetectable.  The left zone 4 lymph node was negative for malignancy and thyroglobulin testing was undetectable.      Due to low risk of recurrence, I-131 therapy was not reccommended. However, she was concerned about risk of recurrence due to ultrasound abnormalities and  previous inaccurate biopsies and decided to have " I-131 therapy. Thyrogen stimulated I-131 therapy  was performed 2023 with 49.7mCi of I-131.  Pre-Therapy scan did not show any uptake, post therapy scan did show foci of uptake in the right thyroid bed.  Most recent ultrasound completed 24 showed stable lymph nodes. Thyroglobulin has remained undetectable.      For hypothyroidism, she is taking levothyroxine 137mcg daily.  Her TSH goal is 0.5 to 2.0. She started having an increase in palpitations about 2 months ago so levothyroxine was changed to 1 tablet 6 days a week and no tablet on . Repeat labs showed higher TSH at 3.2. Levothyroxine was changed back to her original 137 mcg daily 1 week ago by surgical oncology.     She is no undergoing cardiac work-up due to near syncope episode and increased palpitations. She is wearing Zio patch. She has further testing scheduled.       Patient Active Problem List   Diagnosis    Generalized osteoarthritis of multiple sites    Postoperative hypothyroidism    Stress incontinence, female    Surgical menopause    Lumbar disc disease    Chronic right-sided low back pain without sciatica    Lumbar radiculopathy    Hypertriglyceridemia    Status post bariatric surgery    History of thyroid cancer    Thiamine deficiency    History of partial gastrectomy    Vaginal atrophy    Encounter for follow-up surveillance of thyroid cancer    Vitamin D deficiency      Past Medical History:   Diagnosis Date    Abnormal Pap smear of cervix     Arthritis     Cervical dysplasia     LAST ASSESSED: [id number]    Disease of thyroid gland     Generalized anxiety disorder 2012    HPV (human papilloma virus) infection     Hypothyroid     Minimally invasive follicular carcinoma of thyroid (HCC) 2021    Pilonidal disease of philip cleft 2022      Past Surgical History:   Procedure Laterality Date    BILATERAL SALPINGOOPHORECTOMY Bilateral     CERVICAL BIOPSY  W/ LOOP ELECTRODE EXCISION      CHOLECYSTECTOMY       "COLPOSCOPY      GASTRECTOMY SLEEVE LAPAROSCOPIC  2020    HYSTERECTOMY       age 36    LYMPH NODE BIOPSY      OOPHORECTOMY Bilateral     age 36    THYROIDECTOMY Bilateral 2021    Procedure: THYROIDECTOMY, total;  Surgeon: Jonatan Strange MD;  Location: [facility];  Service: Surgical Oncology    TUBAL LIGATION      US GUIDED LYMPH NODE BIOPSY LEFT  2022    US GUIDED THYROID BIOPSY  2021    US GUIDED THYROID BIOPSY  06/15/2021      Family History   Problem Relation Age of Onset    COPD Mother     Alcohol abuse Mother     Substance Abuse Mother     Mental illness Mother     Depression Mother     Coronary artery disease Mother     Asthma Mother     Anxiety disorder Mother     Bipolar disorder Mother     Drug abuse Mother     Psychiatric Illness Mother     Schizophrenia Mother     Suicide Attempts Mother     Diabetes Father     Hypertension Father     No Known Problems Sister     No Known Problems Daughter     Breast cancer Maternal Grandmother 63    No Known Problems Maternal Grandfather     No Known Problems Paternal Grandmother     Diabetes type I Paternal Grandfather     No Known Problems Maternal Aunt     No Known Problems Maternal Aunt     No Known Problems Paternal Aunt     Diabetes Family     Hypertension Family     Skin cancer Family     No Known Problems Son      Social History     Tobacco Use    Smoking status: Former     Current packs/day: 0.00     Average packs/day: 0.3 packs/day for 2.0 years (0.5 ttl pk-yrs)     Types: Cigarettes     Start date: 1988     Quit date: 1990     Years since quittin.9    Smokeless tobacco: Never   Substance Use Topics    Alcohol use: Yes     Comment: SOCIAL     Allergies   Allergen Reactions    Ace Inhibitors Cough    Compazine [Prochlorperazine] Other (See Comments)     \"makes me crazy\"    Phenothiazines Confusion    Quinolones Other (See Comments)     Patient does not remember reaction    Sulfamethoxazole-Trimethoprim Blisters     In mouth " "        Current Outpatient Medications:     Cholecalciferol (Vitamin D3) 50 MCG (2000 UT) capsule, Take 1 capsule by mouth once daily, Disp: 90 capsule, Rfl: 1    levothyroxine 137 mcg tablet, Take 1 tablet by mouth once daily, Disp: 90 tablet, Rfl: 0    Review of Systems   Constitutional:  Negative for chills, fatigue, fever and unexpected weight change.   HENT:  Negative for sore throat, trouble swallowing and voice change.    Respiratory:  Negative for shortness of breath.    Cardiovascular:  Positive for palpitations. Negative for chest pain.   Gastrointestinal:  Negative for constipation, diarrhea, nausea and vomiting.   Endocrine: Negative for cold intolerance and heat intolerance.   Neurological:  Positive for syncope (1 episode - undergoining cardiac work up).   All other systems reviewed and are negative.      Physical Exam:  Body mass index is 30.29 kg/m².  /80   Pulse 68   Ht 5' 5\" (1.651 m)   Wt 82.6 kg (182 lb)   SpO2 98%   BMI 30.29 kg/m²    Wt Readings from Last 3 Encounters:   11/26/24 82.6 kg (182 lb)   11/18/24 83.9 kg (185 lb)   09/16/24 81.6 kg (180 lb)       Physical Exam  Vitals reviewed.   Constitutional:       Appearance: Normal appearance.   Neck:      Comments: Thyroid absent  Cardiovascular:      Rate and Rhythm: Normal rate.   Pulmonary:      Effort: Pulmonary effort is normal.   Neurological:      Mental Status: She is alert and oriented to person, place, and time.   Psychiatric:         Mood and Affect: Mood normal.         Labs:     Lab Results   Component Value Date    WQZ5IPAXWDTK 3.230 11/13/2024    HGQ2TOFSOEGV 0.421 (L) 09/12/2024    [id number] 0.371 (L) 06/04/2024     Lab Results   Component Value Date    FREET4 1.10 11/13/2024       Orders Placed This Encounter   Procedures    TSH, 3rd generation     This is a patient instruction: This test is non-fasting. Please drink two glasses of water morning of bloodwork.        Standing Status:   Future     Expected Date:   " 1/7/2025     Expiration Date:   11/26/2025    T4, free     Standing Status:   Future     Expected Date:   1/7/2025     Expiration Date:   11/26/2025    TSH, 3rd generation     This is a patient instruction: This test is non-fasting. Please drink two glasses of water morning of bloodwork.        Standing Status:   Future     Expected Date:   5/26/2025     Expiration Date:   11/26/2025    T4, free     Standing Status:   Future     Expected Date:   5/26/2025     Expiration Date:   11/26/2025    Thyroglobulin     Standing Status:   Future     Expected Date:   5/26/2025     Expiration Date:   11/26/2025       There are no Patient Instructions on file for this visit.    Discussed with the patient and all questioned fully answered. She will call me if any problems arise.    CHRIS Miller

## 2024-11-26 NOTE — ASSESSMENT & PLAN NOTE
TSH was above goal so dose was changed 1 week ago  Plan to repeat labs in 6 weeks  TSH goal 0.5-2    Orders:    TSH, 3rd generation; Future    T4, free; Future    TSH, 3rd generation; Future    T4, free; Future

## 2024-12-10 ENCOUNTER — OFFICE VISIT (OUTPATIENT)
Dept: FAMILY MEDICINE CLINIC | Facility: CLINIC | Age: 54
End: 2024-12-10
Payer: COMMERCIAL

## 2024-12-10 VITALS
HEIGHT: 65 IN | BODY MASS INDEX: 30.49 KG/M2 | SYSTOLIC BLOOD PRESSURE: 128 MMHG | TEMPERATURE: 97.1 F | DIASTOLIC BLOOD PRESSURE: 80 MMHG | RESPIRATION RATE: 16 BRPM | WEIGHT: 183 LBS

## 2024-12-10 DIAGNOSIS — E89.0 POSTOPERATIVE HYPOTHYROIDISM: ICD-10-CM

## 2024-12-10 DIAGNOSIS — E55.9 VITAMIN D DEFICIENCY: Primary | ICD-10-CM

## 2024-12-10 DIAGNOSIS — Z23 ENCOUNTER FOR IMMUNIZATION: ICD-10-CM

## 2024-12-10 DIAGNOSIS — E78.2 MIXED HYPERLIPIDEMIA: ICD-10-CM

## 2024-12-10 PROCEDURE — 90471 IMMUNIZATION ADMIN: CPT

## 2024-12-10 PROCEDURE — 99214 OFFICE O/P EST MOD 30 MIN: CPT | Performed by: FAMILY MEDICINE

## 2024-12-10 PROCEDURE — 90673 RIV3 VACCINE NO PRESERV IM: CPT

## 2024-12-10 RX ORDER — ACETAMINOPHEN 160 MG
2000 TABLET,DISINTEGRATING ORAL DAILY
Qty: 90 CAPSULE | Refills: 1 | Status: SHIPPED | OUTPATIENT
Start: 2024-12-10

## 2024-12-10 NOTE — PROGRESS NOTES
"Name: Madeleine Short      : 1970      MRN: [id number]  Encounter Provider: Tacho Clement MD  Encounter Date: 12/10/2024   Encounter department: [facility]  :  Assessment & Plan  Vitamin D deficiency  Slightly low vitamin D level, refill vitamin D 2000 units daily.  Recheck vitamin D 5 months  Orders:  •  Vitamin D 25 hydroxy; Future  •  Cholecalciferol (Vitamin D3) 50 MCG (2000 UT) capsule; Take 1 capsule (2,000 Units total) by mouth daily    Mixed hyperlipidemia  Continue to work on diet and exercise.  Recheck labs as below  Orders:  •  Lipid panel; Future  •  Comprehensive metabolic panel; Future    Encounter for immunization    Orders:  •  influenza vaccine, recombinant, PF, 0.5 mL IM (Flublok)    Postoperative hypothyroidism  Continue levothyroxine 137 and follow-up with endocrinology              History of Present Illness     Presents today for follow-up on vitamin D deficiency and hypothyroidism.      Review of Systems   Constitutional:  Negative for activity change, appetite change, chills, fatigue and fever.   HENT:  Negative for congestion, rhinorrhea, sneezing and sore throat.    Eyes:  Negative for pain, discharge, redness and itching.   Respiratory:  Negative for cough, chest tightness, shortness of breath and wheezing.    Cardiovascular:  Negative for chest pain and palpitations.   Gastrointestinal:  Negative for abdominal distention, abdominal pain, constipation, diarrhea, nausea and vomiting.   Musculoskeletal:  Positive for arthralgias. Negative for back pain, joint swelling and myalgias.   Skin:  Negative for rash.   Neurological:  Negative for dizziness, weakness, numbness and headaches.   Hematological:  Negative for adenopathy.   Psychiatric/Behavioral:  Negative for confusion.    All other systems reviewed and are negative.      Objective   /80   Temp (!) 97.1 °F (36.2 °C) (Temporal)   Resp 16   Ht 5' 5\" (1.651 m)   Wt 83 kg (183 lb)   BMI 30.45 " kg/m²      Physical Exam  Vitals reviewed.   Constitutional:       General: She is not in acute distress.     Appearance: Normal appearance. She is well-developed. She is not ill-appearing or toxic-appearing.   HENT:      Head: Normocephalic and atraumatic.      Right Ear: External ear normal.      Left Ear: External ear normal.      Nose: Nose normal. No congestion or rhinorrhea.      Mouth/Throat:      Mouth: Mucous membranes are moist.   Eyes:      General: No scleral icterus.        Right eye: No discharge.         Left eye: No discharge.      Extraocular Movements: Extraocular movements intact.      Conjunctiva/sclera: Conjunctivae normal.      Pupils: Pupils are equal, round, and reactive to light.   Cardiovascular:      Rate and Rhythm: Normal rate and regular rhythm.      Pulses: Normal pulses.      Heart sounds: Normal heart sounds. No murmur heard.  Pulmonary:      Effort: Pulmonary effort is normal. No respiratory distress.      Breath sounds: Normal breath sounds.   Abdominal:      General: There is no distension.      Palpations: Abdomen is soft. There is no mass.      Tenderness: There is no abdominal tenderness.      Hernia: No hernia is present.   Musculoskeletal:         General: Signs of injury (right wrist in cast) present. No swelling, tenderness or deformity.      Cervical back: Normal range of motion.   Skin:     General: Skin is warm and dry.      Findings: No bruising, erythema, lesion or rash.   Neurological:      General: No focal deficit present.      Mental Status: She is alert.      Motor: No weakness.      Gait: Gait normal.   Psychiatric:         Mood and Affect: Mood normal.         Behavior: Behavior normal.

## 2024-12-10 NOTE — ASSESSMENT & PLAN NOTE
Slightly low vitamin D level, refill vitamin D 2000 units daily.  Recheck vitamin D 5 months  Orders:  •  Vitamin D 25 hydroxy; Future  •  Cholecalciferol (Vitamin D3) 50 MCG (2000 UT) capsule; Take 1 capsule (2,000 Units total) by mouth daily

## 2024-12-11 ENCOUNTER — PATIENT MESSAGE (OUTPATIENT)
Dept: OBGYN CLINIC | Facility: CLINIC | Age: 54
End: 2024-12-11

## 2024-12-11 DIAGNOSIS — F41.9 ANXIETY: Primary | ICD-10-CM

## 2024-12-11 RX ORDER — DIAZEPAM 10 MG/1
TABLET ORAL
Qty: 1 TABLET | Refills: 0 | Status: SHIPPED | OUTPATIENT
Start: 2024-12-11

## 2024-12-12 DIAGNOSIS — E89.40 SURGICAL MENOPAUSE: Primary | ICD-10-CM

## 2024-12-17 DIAGNOSIS — E89.40 SURGICAL MENOPAUSE: Primary | ICD-10-CM

## 2024-12-17 RX ORDER — ESTRADIOL 1 MG/1
1 TABLET ORAL DAILY
Qty: 30 TABLET | Refills: 11 | Status: SHIPPED | OUTPATIENT
Start: 2024-12-17

## 2025-01-30 DIAGNOSIS — E03.9 ACQUIRED HYPOTHYROIDISM: ICD-10-CM

## 2025-01-30 DIAGNOSIS — Z85.850 HX OF THYROID CANCER: ICD-10-CM

## 2025-01-30 RX ORDER — LEVOTHYROXINE SODIUM 137 UG/1
137 TABLET ORAL DAILY
Qty: 90 TABLET | Refills: 1 | Status: SHIPPED | OUTPATIENT
Start: 2025-01-30

## 2025-04-09 ENCOUNTER — TELEPHONE (OUTPATIENT)
Age: 55
End: 2025-04-09

## 2025-04-09 NOTE — TELEPHONE ENCOUNTER
New patient called she would like a hormone panel  she had a hysterectomy and was on hormones for 15 yrs and then off for about 5 yrs and now is recently taking estradiol 1mg   She states she has been having menopusal symptoms    Patient has an appt 04/22/25

## 2025-04-09 NOTE — TELEPHONE ENCOUNTER
Spoke with patient she would need to establish care before we can send her for blood work. Pt is aware.

## 2025-04-15 ENCOUNTER — TELEPHONE (OUTPATIENT)
Age: 55
End: 2025-04-15

## 2025-04-15 NOTE — TELEPHONE ENCOUNTER
Patient called stating she was a patient of Dr Clement. She is requesting a script to be put in for a full hormone panel before appointment with new OBGYN. I advised she was last seen 12/10/24 so she would need to establish care with another provider in the office. Patient became upset and ended the phone call.

## 2025-04-22 ENCOUNTER — OFFICE VISIT (OUTPATIENT)
Dept: OBGYN CLINIC | Facility: CLINIC | Age: 55
End: 2025-04-22
Payer: COMMERCIAL

## 2025-04-22 VITALS
WEIGHT: 185.2 LBS | DIASTOLIC BLOOD PRESSURE: 88 MMHG | SYSTOLIC BLOOD PRESSURE: 130 MMHG | BODY MASS INDEX: 30.86 KG/M2 | HEIGHT: 65 IN

## 2025-04-22 DIAGNOSIS — Z01.419 WOMEN'S ANNUAL ROUTINE GYNECOLOGICAL EXAMINATION: Primary | ICD-10-CM

## 2025-04-22 DIAGNOSIS — N89.8 VAGINAL DRYNESS: ICD-10-CM

## 2025-04-22 DIAGNOSIS — E89.40 SURGICAL MENOPAUSE: ICD-10-CM

## 2025-04-22 DIAGNOSIS — Z12.31 SCREENING MAMMOGRAM, ENCOUNTER FOR: ICD-10-CM

## 2025-04-22 PROCEDURE — 99396 PREV VISIT EST AGE 40-64: CPT | Performed by: OBSTETRICS & GYNECOLOGY

## 2025-04-22 RX ORDER — ESTRADIOL 0.1 MG/D
1 FILM, EXTENDED RELEASE TRANSDERMAL 2 TIMES WEEKLY
Qty: 8 PATCH | Refills: 3 | Status: SHIPPED | OUTPATIENT
Start: 2025-04-24

## 2025-04-22 RX ORDER — ESTRADIOL 10 UG/1
1 TABLET, FILM COATED VAGINAL 2 TIMES WEEKLY
Qty: 8 TABLET | Refills: 3 | Status: SHIPPED | OUTPATIENT
Start: 2025-04-24

## 2025-04-22 NOTE — PROGRESS NOTES
"Leno Short is a 55 y.o. female who presents for annual well woman exam.   No period since age 36  sec to hysterectomy sec to abn HPV     History of abnormal Pap smear: yes -   Family history of uterine or ovarian cancer: no  Family history of breast cancer: yes - MGM    Menstrual History:  OB History          3    Para   2    Term   2            AB   1    Living   2         SAB        IAB   1    Ectopic        Multiple        Live Births   2           Obstetric Comments   MENOPAUSE                No LMP recorded. Patient has had a hysterectomy.       The following portions of the patient's history were reviewed and updated as appropriate: allergies, current medications, past family history, past medical history, past social history, past surgical history, and problem list.    Review of Systems  Review of Systems   Constitutional:  Negative for activity change, appetite change, chills, fatigue and fever.   Respiratory:  Negative for apnea, cough, chest tightness and shortness of breath.    Cardiovascular:  Negative for chest pain, palpitations and leg swelling.   Gastrointestinal:  Negative for abdominal pain, constipation, diarrhea, nausea and vomiting.   Genitourinary:  Positive for dyspareunia. Negative for difficulty urinating, dysuria, flank pain, frequency, hematuria and urgency.   Neurological:  Negative for dizziness, seizures, syncope, light-headedness, numbness and headaches.   Psychiatric/Behavioral:  Negative for agitation and confusion.           Objective      /88 (BP Location: Left arm, Patient Position: Sitting, Cuff Size: Adult)   Ht 5' 5\" (1.651 m)   Wt 84 kg (185 lb 3.2 oz)   BMI 30.82 kg/m²     Physical Exam  OBGyn Exam     General:   alert and oriented, in no acute distress, alert, appears stated age, and cooperative   Heart: regular rate and rhythm, S1, S2 normal, no murmur, click, rub or gallop   Lungs: clear to auscultation bilaterally   Abdomen: " soft, non-tender, without masses or organomegaly   Vulva: normal, Bartholin's, Urethra, [place]'s normal   Vagina: normal mucosa, normal discharge, very superficial tear in the vaginal cuff had intercourse last night   Cervix: surgically absent   Uterus: surgically absent   Adnexa:  Breast Exam:  no mass, fullness, tenderness  breasts appear normal, no suspicious masses, no skin or nipple changes or axillary nodes.                Assessment      @well woman@ .  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  Was taking estrogen stopped 5 years ago then restarted secondary to severe vaginal dryness/menopausal symptoms  Postcoital bleeding in the past  Hypothyroid  Plan   Pap/HPV done last year came back negative  Diet/exercise  Calcium/vitamin D  Mammogram  Position changes and modification with sexual activity reviewed and discussed with patient  Switch to estrogen transdermal risk-benefit side effect of estrogen reviewed discussed with patient  Oil-based moisturizer  Patient desired also vaginal estrogen secondary to dyspareunia  Return to office in 3 months follow-up on menopausal symptoms   All questions answered.     There are no Patient Instructions on file for this visit.

## 2025-04-30 ENCOUNTER — NURSE TRIAGE (OUTPATIENT)
Age: 55
End: 2025-04-30

## 2025-04-30 DIAGNOSIS — N89.8 VAGINAL ITCHING: Primary | ICD-10-CM

## 2025-04-30 RX ORDER — FLUCONAZOLE 150 MG/1
150 TABLET ORAL DAILY
Qty: 1 TABLET | Refills: 0 | Status: SHIPPED | OUTPATIENT
Start: 2025-04-30 | End: 2025-05-01

## 2025-04-30 NOTE — TELEPHONE ENCOUNTER
"RN, please see the thread in patient MYC msg encounter to determine if script is appropriate for Czech \"no smell but extra itchy \" no discharge.  Last seen in office 4/22/25    Thank you   "

## 2025-04-30 NOTE — TELEPHONE ENCOUNTER
"Reason for Disposition  • Symptoms of a yeast infection (i.e., itchy, white discharge, not bad smelling) and feels like prior vaginal yeast infections    Answer Assessment - Initial Assessment Questions  FOLLOW UP: home care, diflucan ordered    REASON FOR CONVERSATION: Chinese    SYMPTOMS: vaginal itching    OTHER: patient calling in, reporting vaginal itching since Sunday and ongoing dryness.  She denies pelvic pain, odor, cramping and discharge.  Used a Monistat 1 day, but still having symptoms.  Diflucan x1 ordered per protocol. Advised calling back if symptoms do not improve in 3 days.  Advised good genital hygiene such as mild unscented soaps to wash, cotton underwear, and the importance of staying clean and dry.    \"How many yeast infections have you had in the past 2 years?\"    -If 1 or less, prescribe Diflucan 150mg PO. If no improvement after 1 dose treatment, please instruct patient to call back to schedule appointment. (should be seen within 3 days)    -If more than 4 or more yeast infections in a year or if they are recurrent, schedule appointment within 3 days.    For OB patients: if patient wishes to use over the counter monistat, recommend only the 7 day treatment.       DISPOSITION: Order Prescription, Home Care      1. SYMPTOM: \"What's the main symptom you're concerned about?\" (e.g., pain, itching, dryness)      Dryness, itching  2. LOCATION: \"Where is the symptoms located?\" (e.g., inside/outside, left/right)      outside  3. ONSET: \"When did the  symptoms  start?\"      sunday  4. PAIN: \"Is there any pain?\" If Yes, ask: \"How bad is it?\" (Scale: 1-10; mild, moderate, severe)      denies  5. ITCHING: \"Is there any itching?\" If Yes, ask: \"How bad is it?\" (Scale: 1-10; mild, moderate, severe)      6/10  6. CAUSE: \"What do you think is causing the discharge?\" \"Have you had the same problem before?\" \"What happened then?\"      Chinese  7. OTHER SYMPTOMS: \"Do you have any other symptoms?\" (e.g., fever, itching, " "vaginal bleeding, pain with urination, injury to genital area, vaginal foreign body)      denies  8. PREGNANCY: \"Is there any chance you are pregnant?\" \"When was your last menstrual period?\"      N/a    Protocols used: Vaginal Symptoms-Adult-[place]    "

## 2025-05-06 ENCOUNTER — OFFICE VISIT (OUTPATIENT)
Dept: OBGYN CLINIC | Facility: CLINIC | Age: 55
End: 2025-05-06
Payer: COMMERCIAL

## 2025-05-06 VITALS
DIASTOLIC BLOOD PRESSURE: 84 MMHG | HEIGHT: 65 IN | WEIGHT: 187.8 LBS | BODY MASS INDEX: 31.29 KG/M2 | SYSTOLIC BLOOD PRESSURE: 128 MMHG

## 2025-05-06 DIAGNOSIS — L29.2 VULVOVAGINAL ITCHING: Primary | ICD-10-CM

## 2025-05-06 DIAGNOSIS — Z11.3 SCREEN FOR STD (SEXUALLY TRANSMITTED DISEASE): ICD-10-CM

## 2025-05-06 PROCEDURE — 87591 N.GONORRHOEAE DNA AMP PROB: CPT | Performed by: PHYSICIAN ASSISTANT

## 2025-05-06 PROCEDURE — 87480 CANDIDA DNA DIR PROBE: CPT | Performed by: PHYSICIAN ASSISTANT

## 2025-05-06 PROCEDURE — 87660 TRICHOMONAS VAGIN DIR PROBE: CPT | Performed by: PHYSICIAN ASSISTANT

## 2025-05-06 PROCEDURE — 87510 GARDNER VAG DNA DIR PROBE: CPT | Performed by: PHYSICIAN ASSISTANT

## 2025-05-06 PROCEDURE — 99213 OFFICE O/P EST LOW 20 MIN: CPT | Performed by: PHYSICIAN ASSISTANT

## 2025-05-06 PROCEDURE — 87491 CHLMYD TRACH DNA AMP PROBE: CPT | Performed by: PHYSICIAN ASSISTANT

## 2025-05-06 RX ORDER — CLOTRIMAZOLE AND BETAMETHASONE DIPROPIONATE 10; .64 MG/G; MG/G
CREAM TOPICAL 2 TIMES DAILY
Qty: 45 G | Refills: 0 | Status: SHIPPED | OUTPATIENT
Start: 2025-05-06 | End: 2025-05-13

## 2025-05-06 NOTE — PROGRESS NOTES
Assessment/Plan:      Diagnoses and all orders for this visit:    Vulvovaginal itching  -     clotrimazole-betamethasone (LOTRISONE) 1-0.05 % cream; Apply topically 2 (two) times a day for 7 days To affected areas of vulva  -     VAGINOSIS DNA PROBE    Screen for STD (sexually transmitted disease)  -     Chlamydia/GC amplified DNA by PCR     55-year-old female on Vivelle-Dot patch, Vagifem estradiol tablet twice weekly, status post hysterectomy presenting today requesting STD screen as well as concern for some persistent vulvovaginal itching primarily vulvar around the vaginal opening.    Recently completed a dose of fluconazole as well as Monistat OTC without resolution in symptoms.  Requesting STD screen.    Vaginosis probe  GC CT screen  Will prescribe topical Lotrisone for symptomatic relief of external vulva twice daily.  Will contact patient with test results and treat accordingly.  She will follow-up as appropriate for HRT with Dr. Khan.    Chief Complaint   Patient presents with    Vaginitis     Pt is experiencing severe itching, no discharge.        Subjective:     Patient ID: Madeleine Short is a 55 y.o. female.    55-year-old female on hormone replacement therapy as well as Vagifem tablet presenting today for concern of persistent vulvovaginal itching.    She reported possible yeast infection recently treated a week or 2 ago with oral fluconazole as well as tried Monistat OTC with minimal improvement.  She reports recent new sexual partner unprotected intercourse and mainly concern for STD and desires screening.  Denies significant abnormal discharge or appreciable odor.  No abnormal vaginal bleeding, pelvic pain or urinary symptoms.  S/p hysterectomy        Review of Systems   Constitutional: Negative.    Genitourinary:  Positive for vaginal pain (vulvovaginal itching/irritation). Negative for dysuria, frequency, genital sores, hematuria, pelvic pain, urgency, vaginal bleeding and vaginal discharge.          The following portions of the patient's history were reviewed and updated as appropriate: allergies, current medications, past family history, past medical history, past social history, past surgical history, and problem list.      Objective:     Physical Exam  Vitals reviewed.   Constitutional:       Appearance: Normal appearance. She is not ill-appearing.   Genitourinary:     Labia:         Right: No tenderness, lesion or injury.         Left: No tenderness, lesion or injury.       Vagina: Vaginal discharge (Clumpy  white discharge) present. No erythema, tenderness, bleeding or lesions.      Comments: Minimal mild irritation/erythema at vaginal opening without discrete rash, lesion, swelling or excoriation.  Neurological:      Mental Status: She is alert and oriented to person, place, and time.   Psychiatric:         Mood and Affect: Mood normal.         Behavior: Behavior normal. Behavior is cooperative.

## 2025-05-07 ENCOUNTER — RESULTS FOLLOW-UP (OUTPATIENT)
Dept: OBGYN CLINIC | Facility: CLINIC | Age: 55
End: 2025-05-07

## 2025-05-07 DIAGNOSIS — B96.89 BV (BACTERIAL VAGINOSIS): Primary | ICD-10-CM

## 2025-05-07 DIAGNOSIS — N76.0 BV (BACTERIAL VAGINOSIS): Primary | ICD-10-CM

## 2025-05-07 LAB
C TRACH DNA SPEC QL NAA+PROBE: NEGATIVE
CANDIDA RRNA VAG QL PROBE: NOT DETECTED
G VAGINALIS RRNA GENITAL QL PROBE: DETECTED
N GONORRHOEA DNA SPEC QL NAA+PROBE: NEGATIVE
T VAGINALIS RRNA GENITAL QL PROBE: NOT DETECTED

## 2025-05-07 RX ORDER — CLINDAMYCIN PHOSPHATE 20 MG/G
1 CREAM VAGINAL
Qty: 40 G | Refills: 0 | Status: SHIPPED | OUTPATIENT
Start: 2025-05-07 | End: 2025-05-14

## 2025-05-28 ENCOUNTER — APPOINTMENT (OUTPATIENT)
Age: 55
End: 2025-05-28
Payer: COMMERCIAL

## 2025-05-28 DIAGNOSIS — Z85.850 HISTORY OF THYROID CANCER: ICD-10-CM

## 2025-05-28 DIAGNOSIS — E89.0 POSTOPERATIVE HYPOTHYROIDISM: ICD-10-CM

## 2025-05-28 LAB
T4 FREE SERPL-MCNC: 1.38 NG/DL (ref 0.61–1.12)
TSH SERPL DL<=0.05 MIU/L-ACNC: 0.44 UIU/ML (ref 0.45–4.5)

## 2025-05-28 PROCEDURE — 84432 ASSAY OF THYROGLOBULIN: CPT

## 2025-05-28 PROCEDURE — 84439 ASSAY OF FREE THYROXINE: CPT

## 2025-05-28 PROCEDURE — 36415 COLL VENOUS BLD VENIPUNCTURE: CPT

## 2025-05-28 PROCEDURE — 86800 THYROGLOBULIN ANTIBODY: CPT

## 2025-05-28 PROCEDURE — 84443 ASSAY THYROID STIM HORMONE: CPT

## 2025-05-29 LAB
THYROGLOB AB SERPL-ACNC: <1 IU/ML (ref 0–0.9)
THYROGLOB SERPL-MCNC: <0.1 NG/ML (ref 1.5–38.5)

## 2025-05-30 ENCOUNTER — RESULTS FOLLOW-UP (OUTPATIENT)
Dept: ENDOCRINOLOGY | Facility: CLINIC | Age: 55
End: 2025-05-30

## 2025-06-03 ENCOUNTER — OFFICE VISIT (OUTPATIENT)
Dept: ENDOCRINOLOGY | Facility: CLINIC | Age: 55
End: 2025-06-03
Payer: COMMERCIAL

## 2025-06-03 VITALS
HEIGHT: 65 IN | OXYGEN SATURATION: 100 % | BODY MASS INDEX: 31.46 KG/M2 | RESPIRATION RATE: 16 BRPM | WEIGHT: 188.8 LBS | HEART RATE: 67 BPM | SYSTOLIC BLOOD PRESSURE: 132 MMHG | DIASTOLIC BLOOD PRESSURE: 82 MMHG

## 2025-06-03 DIAGNOSIS — E89.0 POSTOPERATIVE HYPOTHYROIDISM: Primary | ICD-10-CM

## 2025-06-03 DIAGNOSIS — Z08 ENCOUNTER FOR FOLLOW-UP SURVEILLANCE OF THYROID CANCER: ICD-10-CM

## 2025-06-03 DIAGNOSIS — Z85.850 ENCOUNTER FOR FOLLOW-UP SURVEILLANCE OF THYROID CANCER: ICD-10-CM

## 2025-06-03 PROCEDURE — 99214 OFFICE O/P EST MOD 30 MIN: CPT | Performed by: PHYSICIAN ASSISTANT

## 2025-06-03 NOTE — PROGRESS NOTES
Name: Madeleine Short      : 1970      MRN: [id number]  Encounter Provider: Alem Cordoba PA-C  Encounter Date: 6/3/2025   Encounter department: [facility] [place]    Chief Complaint   Patient presents with   • Hypothyroidism   • Thyroid Cancer   :  Assessment & Plan  Postoperative hypothyroidism  Goal TSH: 0.5 - 2.0  Previously reduced 137 dose to 6 days/week and had TSH of 3.6. Has reduced daily dosing.  TSH: 0.439  T4: 1.38   Maintained on levothyroxine 137 daily, continue  Orders:  •  Thyroglobulin Panel; Future  •  TSH, 3rd generation with Free T4 reflex; Future    Encounter for follow-up surveillance of thyroid cancer  Repeat annual US in November   Thyroglobulin undetectable           Pertinent Medical History   Patient has a past medical history of thyroid cancer with thyroidectomy performed 2021 for minimally invasive FTC without extension.  No lymphovascular invasion noted.  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  Lymph node mapping ultrasound 2022 revealed 2 suspicious looking lymph nodes on the left side of the neck.  FNA was performed in zone 3 did show atypical cellular changes with thyroglobulin testing was undetectable.  Left zone 4 lymph node negative for malignancy and thyroglobulin testing undetectable.    Due to low risk of recurrence, I-131 therapy was not recommended.  However, given patient concern over risk of recurrence due to ultrasound abnormalities and previous inaccurate biopsies, patient to pursue I-131 therapy.  This was performed 2023 with 49.7 MCI of I-131.  Pretherapy scan did not reveal any uptake, posttherapy scan did show foci of uptake in the right thyroid bed.    She continues ongoing follow-up with endocrinology for postoperative hypothyroidism.  She has a goal TSH of 0.5-2.0.      History of Present Illness {?Quick Links Encounters * My Last Note * Last Note in Specialty *  "Snapshot * Since Last Visit * History :56628}  History of Present Illness  Madeleine Short is a 55 y.o. female with a past medical history of thyroid cancer status post thyroidectomy 9/21/2021, now with postoperative hypothyroidism who presents for follow-up. No significant concerns have been noted since her last visit.    She reports minor weight fluctuations and occasional heart palpitations, which she continues to monitor.  She has no difficulty swallowing, breathing, or voice changes. She takes her medication on an empty stomach in the morning, followed by coffee. She also takes vitamin D and calcium supplements 2.5-3 hours later. Her current dosage is 137 mcg daily. A previous adjustment, which involved skipping Sundays, led to high TSH levels, so she returned to a daily regimen.     Recently, she started using an estradiol patch and inserts a 10 mg pill vaginally twice a week as prescribed by her OB-GYN.          Review of Systems as per hospitals       Medical History Reviewed by provider this encounter:     .    Objective {?Quick Links Trend Vitals * Enter New Vitals * Results Review * Timeline (Adult) * Labs * Imaging * Cardiology * Procedures * Lung Cancer Screening * Surgical eConsent :24370}  /82 (BP Location: Left arm, Patient Position: Sitting, Cuff Size: Adult)   Pulse 67   Resp 16   Ht 5' 5\" (1.651 m)   Wt 85.6 kg (188 lb 12.8 oz)   SpO2 100%   BMI 31.42 kg/m²      Body mass index is 31.42 kg/m².  Wt Readings from Last 3 Encounters:   06/03/25 85.6 kg (188 lb 12.8 oz)   05/06/25 85.2 kg (187 lb 12.8 oz)   04/22/25 84 kg (185 lb 3.2 oz)       Physical Exam  General Appearance: Well-developed, well-appearing, in no acute distress.  Vital signs: Within normal limits.  HEENT: Normocephalic and atraumatic.  Neck: surgically absent thyroid  Eyes: No scleral icterus. Conjunctivae normal.  Respiratory: Lungs auscultated.  Skin: Warm and dry, no rash.  Neurological: Alert.  Psychiatric: Attention " normal.    Results    Labs: I have reviewed pertinent labs including:   Lab Results   Component Value Date    [id number] 0.439 (L) 05/28/2025        Patient Instructions   Ensure you are taking your thyroid medication at least one hour prior to meals, on an empty stomach and 4 hours before any vitamins/supplements  Obtain labs prior to follow-up appointment  Hold all biotin-containing supplements for three days prior to thyroid lab draws    Discussed with the patient and all questioned fully answered. She will call me if any problems arise.

## 2025-06-03 NOTE — ASSESSMENT & PLAN NOTE
Goal TSH: 0.5 - 2.0  Previously reduced 137 dose to 6 days/week and had TSH of 3.6. Has reduced daily dosing.  TSH: 0.439  T4: 1.38   Maintained on levothyroxine 137 daily, continue  Orders:  •  Thyroglobulin Panel; Future  •  TSH, 3rd generation with Free T4 reflex; Future

## 2025-07-21 ENCOUNTER — OFFICE VISIT (OUTPATIENT)
Dept: OBGYN CLINIC | Facility: CLINIC | Age: 55
End: 2025-07-21
Payer: COMMERCIAL

## 2025-07-21 VITALS
WEIGHT: 191.8 LBS | SYSTOLIC BLOOD PRESSURE: 124 MMHG | HEIGHT: 65 IN | BODY MASS INDEX: 31.96 KG/M2 | DIASTOLIC BLOOD PRESSURE: 60 MMHG

## 2025-07-21 DIAGNOSIS — E89.40 SURGICAL MENOPAUSE: Primary | ICD-10-CM

## 2025-07-21 DIAGNOSIS — N89.8 VAGINAL DRYNESS: ICD-10-CM

## 2025-07-21 PROCEDURE — 99213 OFFICE O/P EST LOW 20 MIN: CPT | Performed by: OBSTETRICS & GYNECOLOGY

## 2025-07-21 RX ORDER — ESTRADIOL 0.07 MG/D
1 FILM, EXTENDED RELEASE TRANSDERMAL 2 TIMES WEEKLY
Qty: 8 PATCH | Refills: 5 | Status: SHIPPED | OUTPATIENT
Start: 2025-07-21

## 2025-07-21 RX ORDER — ESTRADIOL 10 UG/1
1 TABLET, FILM COATED VAGINAL 2 TIMES WEEKLY
Qty: 8 TABLET | Refills: 5 | Status: SHIPPED | OUTPATIENT
Start: 2025-07-21

## 2025-07-21 NOTE — PROGRESS NOTES
Pre-charting for Appointment      Reason for being seen today: 3 month follow up on menopausal symptoms    Any current testing/imaging done prior to exam today:

## 2025-07-21 NOTE — PROGRESS NOTES
Assessment/Plan:     There are no diagnoses linked to this encounter.      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  Was taking estrogen stopped 5 years ago then restarted secondary to severe vaginal dryness/menopausal symptoms  Postcoital bleeding in the past  Hypothyroid  Plan   Pap/HPV done last year came back negative  Diet/exercise  Calcium/vitamin D  Mammogram  Position changes and modification with sexual activity reviewed and discussed with patient  Decreased dose of transdermal estrogen secondary to breast tenderness  Continue vaginal estrogen tablet secondary to dyspareunia/vaginal dryness  Continuing vaginal estrogen  Return to office in 5 to 6 months for follow-up        Subjective:      Patient ID: Madeleine Short is a 55 y.o. female.    HPI  Patient seen evaluated presented office today follow-up on menopausal symptoms  Patient started estrogen patch secondary to menopausal symptoms hot flashes night sweats affecting quality of life  Patient said her symptoms significantly improved with estrogen patch only complaint is mild breast tenderness that improved  Denies any abnormal bleeding denies any headache bloating or any side effect from the hormone replacement therapy patient is overall satisfied and desires to continue risk of stroke, thrombosis, DVT, PE reviewed discussed with patient    Vaginal dryness and symptom also improved with vaginal estrogen tablet patient is using it twice weekly recommend to continue position change with sexual activity and oil-based moisturizer reviewed and discussed with patient    Plan to decrease estrogen patch secondary to the breast tenderness continue vaginal estrogen tablets and return to office in 5 to 6 months for follow-up  The following portions of the patient's history were reviewed and updated as appropriate: allergies, current medications, past family history, past medical history, past social  "history, past surgical history and problem list.    Review of Systems      Objective:      /60 (BP Location: Left arm, Patient Position: Sitting, Cuff Size: Adult)   Ht 5' 5\" (1.651 m)   Wt 87 kg (191 lb 12.8 oz)   BMI 31.92 kg/m²          Physical Exam  Constitutional:       Appearance: Normal appearance.     Neurological:      General: No focal deficit present.      Mental Status: She is alert and oriented to person, place, and time.     Psychiatric:         Mood and Affect: Mood normal.         Behavior: Behavior normal.           "

## 2025-07-22 ENCOUNTER — HOSPITAL ENCOUNTER (OUTPATIENT)
Dept: RADIOLOGY | Facility: IMAGING CENTER | Age: 55
Discharge: HOME | End: 2025-07-22
Payer: COMMERCIAL

## 2025-07-22 VITALS — WEIGHT: 191 LBS | BODY MASS INDEX: 31.82 KG/M2 | HEIGHT: 65 IN

## 2025-07-22 DIAGNOSIS — Z12.31 SCREENING MAMMOGRAM, ENCOUNTER FOR: ICD-10-CM

## 2025-07-22 PROCEDURE — 77063 BREAST TOMOSYNTHESIS BI: CPT

## 2025-07-22 PROCEDURE — 77067 SCR MAMMO BI INCL CAD: CPT

## 2025-07-31 DIAGNOSIS — E03.9 ACQUIRED HYPOTHYROIDISM: ICD-10-CM

## 2025-07-31 DIAGNOSIS — Z85.850 HX OF THYROID CANCER: ICD-10-CM

## 2025-07-31 RX ORDER — LEVOTHYROXINE SODIUM 137 UG/1
137 TABLET ORAL DAILY
Qty: 90 TABLET | Refills: 1 | Status: SHIPPED | OUTPATIENT
Start: 2025-07-31

## (undated) DEVICE — SUT VICRYL 4-0 PS-2 27 IN J426H

## (undated) DEVICE — GLOVE SRG BIOGEL ECLIPSE 7

## (undated) DEVICE — BIPOLAR CORD DISP

## (undated) DEVICE — LIGACLIP MCA MULTIPLE CLIP APPLIERS, 20 SMALL CLIPS: Brand: LIGACLIP

## (undated) DEVICE — SUT MONOCRYL 5-0 P-3 18 IN Y493G

## (undated) DEVICE — SUT SILK 2-0 SH 30 IN K833H

## (undated) DEVICE — PACK UNIVERSAL NECK

## (undated) DEVICE — DRESSING GUAZE ADH BORDER 4 X 4 IN

## (undated) DEVICE — MINOR PROCEDURE DRAPE: Brand: CONVERTORS

## (undated) DEVICE — ELECTRODE BLADE MOD E-Z CLEAN 4IN -0014AM

## (undated) DEVICE — DRAPE SURGIKIT SADDLE BAG

## (undated) DEVICE — SUT SILK 2-0 18 IN A185H

## (undated) DEVICE — SUT VICRYL 3-0 SH 27 IN J416H

## (undated) DEVICE — PLUMEPEN PRO 10FT

## (undated) DEVICE — SURGICEL 4 X 8

## (undated) DEVICE — 3000CC GUARDIAN II: Brand: GUARDIAN

## (undated) DEVICE — SUT SILK 3-0 18 IN A184H

## (undated) DEVICE — SUT VICRYL 3-0 18 IN J110T

## (undated) DEVICE — 3M™ STERI-STRIP™ REINFORCED ADHESIVE SKIN CLOSURES, R1547, 1/2 IN X 4 IN (12 MM X 100 MM), 6 STRIPS/ENVELOPE: Brand: 3M™ STERI-STRIP™

## (undated) DEVICE — INTENDED FOR TISSUE SEPARATION, AND OTHER PROCEDURES THAT REQUIRE A SHARP SURGICAL BLADE TO PUNCTURE OR CUT.: Brand: BARD-PARKER ® CARBON RIB-BACK BLADES

## (undated) DEVICE — GLOVE INDICATOR PI UNDERGLOVE SZ 7 BLUE

## (undated) DEVICE — 3M™ STERI-STRIP™ COMPOUND BENZOIN TINCTURE 40 BAGS/CARTON 4 CARTONS/CASE C1544: Brand: 3M™ STERI-STRIP™

## (undated) DEVICE — HARMONIC 1100 SHEARS, 20CM SHAFT LENGTH: Brand: HARMONIC